# Patient Record
Sex: FEMALE | Race: WHITE | NOT HISPANIC OR LATINO | Employment: OTHER | RURAL
[De-identification: names, ages, dates, MRNs, and addresses within clinical notes are randomized per-mention and may not be internally consistent; named-entity substitution may affect disease eponyms.]

---

## 2021-02-09 ENCOUNTER — HISTORICAL (OUTPATIENT)
Dept: ADMINISTRATIVE | Facility: HOSPITAL | Age: 76
End: 2021-02-09

## 2021-03-15 RX ORDER — NAPROXEN SODIUM 220 MG/1
81 TABLET, FILM COATED ORAL DAILY
COMMUNITY
End: 2022-07-18

## 2021-03-15 RX ORDER — METFORMIN HYDROCHLORIDE 500 MG/1
500 TABLET ORAL 2 TIMES DAILY WITH MEALS
COMMUNITY
End: 2021-03-16 | Stop reason: SDUPTHER

## 2021-03-15 RX ORDER — LISINOPRIL AND HYDROCHLOROTHIAZIDE 12.5; 2 MG/1; MG/1
1 TABLET ORAL DAILY
COMMUNITY
End: 2021-03-16 | Stop reason: SDUPTHER

## 2021-03-15 RX ORDER — PAROXETINE 10 MG/1
10 TABLET, FILM COATED ORAL EVERY MORNING
COMMUNITY
End: 2021-03-16 | Stop reason: SDUPTHER

## 2021-03-15 RX ORDER — METOPROLOL TARTRATE 100 MG/1
100 TABLET ORAL 2 TIMES DAILY
COMMUNITY
End: 2021-03-16 | Stop reason: SDUPTHER

## 2021-03-15 RX ORDER — HYDRALAZINE HYDROCHLORIDE 10 MG/1
10 TABLET, FILM COATED ORAL 2 TIMES DAILY
COMMUNITY
End: 2021-03-16 | Stop reason: SDUPTHER

## 2021-03-16 ENCOUNTER — OFFICE VISIT (OUTPATIENT)
Dept: NEUROLOGY | Facility: CLINIC | Age: 76
End: 2021-03-16
Payer: MEDICARE

## 2021-03-16 VITALS
DIASTOLIC BLOOD PRESSURE: 80 MMHG | SYSTOLIC BLOOD PRESSURE: 160 MMHG | WEIGHT: 158 LBS | RESPIRATION RATE: 18 BRPM | OXYGEN SATURATION: 98 % | HEIGHT: 59 IN | BODY MASS INDEX: 31.85 KG/M2 | HEART RATE: 64 BPM

## 2021-03-16 DIAGNOSIS — I63.9 CEREBROVASCULAR ACCIDENT (CVA), UNSPECIFIED MECHANISM: Primary | ICD-10-CM

## 2021-03-16 DIAGNOSIS — E11.9 TYPE 2 DIABETES MELLITUS WITHOUT COMPLICATION, WITH LONG-TERM CURRENT USE OF INSULIN: ICD-10-CM

## 2021-03-16 DIAGNOSIS — I10 HYPERTENSION, UNSPECIFIED TYPE: ICD-10-CM

## 2021-03-16 DIAGNOSIS — F32.A DEPRESSION, UNSPECIFIED DEPRESSION TYPE: ICD-10-CM

## 2021-03-16 DIAGNOSIS — Z79.4 TYPE 2 DIABETES MELLITUS WITHOUT COMPLICATION, WITH LONG-TERM CURRENT USE OF INSULIN: ICD-10-CM

## 2021-03-16 PROCEDURE — 99214 OFFICE O/P EST MOD 30 MIN: CPT | Mod: PBBFAC | Performed by: NURSE PRACTITIONER

## 2021-03-16 PROCEDURE — 99999 PR PBB SHADOW E&M-EST. PATIENT-LVL IV: CPT | Mod: PBBFAC,,, | Performed by: NURSE PRACTITIONER

## 2021-03-16 PROCEDURE — 99213 PR OFFICE/OUTPT VISIT, EST, LEVL III, 20-29 MIN: ICD-10-PCS | Mod: S$PBB,,, | Performed by: NURSE PRACTITIONER

## 2021-03-16 PROCEDURE — 99999 PR PBB SHADOW E&M-EST. PATIENT-LVL IV: ICD-10-PCS | Mod: PBBFAC,,, | Performed by: NURSE PRACTITIONER

## 2021-03-16 PROCEDURE — 99213 OFFICE O/P EST LOW 20 MIN: CPT | Mod: S$PBB,,, | Performed by: NURSE PRACTITIONER

## 2021-03-16 RX ORDER — LISINOPRIL AND HYDROCHLOROTHIAZIDE 12.5; 2 MG/1; MG/1
1 TABLET ORAL DAILY
Qty: 30 TABLET | Refills: 0 | Status: SHIPPED | OUTPATIENT
Start: 2021-03-16 | End: 2021-05-03 | Stop reason: SDUPTHER

## 2021-03-16 RX ORDER — METFORMIN HYDROCHLORIDE 500 MG/1
500 TABLET ORAL 2 TIMES DAILY WITH MEALS
Qty: 30 TABLET | Refills: 0 | Status: SHIPPED | OUTPATIENT
Start: 2021-03-16 | End: 2021-05-03 | Stop reason: SDUPTHER

## 2021-03-16 RX ORDER — METOPROLOL TARTRATE 100 MG/1
100 TABLET ORAL 2 TIMES DAILY
Qty: 30 TABLET | Refills: 0 | Status: SHIPPED | OUTPATIENT
Start: 2021-03-16 | End: 2021-05-03 | Stop reason: SDUPTHER

## 2021-03-16 RX ORDER — HYDRALAZINE HYDROCHLORIDE 10 MG/1
10 TABLET, FILM COATED ORAL 2 TIMES DAILY
Qty: 30 TABLET | Refills: 0 | Status: SHIPPED | OUTPATIENT
Start: 2021-03-16 | End: 2021-05-03 | Stop reason: SDUPTHER

## 2021-03-16 RX ORDER — PAROXETINE 10 MG/1
10 TABLET, FILM COATED ORAL EVERY MORNING
Qty: 30 TABLET | Refills: 0 | Status: SHIPPED | OUTPATIENT
Start: 2021-03-16 | End: 2021-05-03 | Stop reason: SDUPTHER

## 2021-05-03 ENCOUNTER — OFFICE VISIT (OUTPATIENT)
Dept: PRIMARY CARE CLINIC | Facility: CLINIC | Age: 76
End: 2021-05-03
Payer: MEDICARE

## 2021-05-03 VITALS
WEIGHT: 141 LBS | OXYGEN SATURATION: 98 % | HEART RATE: 62 BPM | SYSTOLIC BLOOD PRESSURE: 152 MMHG | HEIGHT: 60 IN | RESPIRATION RATE: 18 BRPM | DIASTOLIC BLOOD PRESSURE: 84 MMHG | BODY MASS INDEX: 27.68 KG/M2

## 2021-05-03 DIAGNOSIS — M54.9 DORSALGIA, UNSPECIFIED: ICD-10-CM

## 2021-05-03 DIAGNOSIS — R20.2 NUMBNESS AND TINGLING OF LEFT LEG: Primary | ICD-10-CM

## 2021-05-03 DIAGNOSIS — I63.9 CEREBROVASCULAR ACCIDENT (CVA), UNSPECIFIED MECHANISM: ICD-10-CM

## 2021-05-03 DIAGNOSIS — I10 HYPERTENSION, UNSPECIFIED TYPE: ICD-10-CM

## 2021-05-03 DIAGNOSIS — E11.9 TYPE 2 DIABETES MELLITUS WITHOUT COMPLICATION, WITH LONG-TERM CURRENT USE OF INSULIN: ICD-10-CM

## 2021-05-03 DIAGNOSIS — R20.0 NUMBNESS AND TINGLING OF LEFT LEG: Primary | ICD-10-CM

## 2021-05-03 DIAGNOSIS — F32.A DEPRESSION, UNSPECIFIED DEPRESSION TYPE: ICD-10-CM

## 2021-05-03 DIAGNOSIS — Z79.4 TYPE 2 DIABETES MELLITUS WITHOUT COMPLICATION, WITH LONG-TERM CURRENT USE OF INSULIN: ICD-10-CM

## 2021-05-03 PROCEDURE — 99214 OFFICE O/P EST MOD 30 MIN: CPT | Mod: ,,, | Performed by: FAMILY MEDICINE

## 2021-05-03 PROCEDURE — 99214 PR OFFICE/OUTPT VISIT, EST, LEVL IV, 30-39 MIN: ICD-10-PCS | Mod: ,,, | Performed by: FAMILY MEDICINE

## 2021-05-03 RX ORDER — PAROXETINE 10 MG/1
10 TABLET, FILM COATED ORAL EVERY MORNING
Qty: 90 TABLET | Refills: 3 | Status: SHIPPED | OUTPATIENT
Start: 2021-05-03 | End: 2022-06-14 | Stop reason: SDUPTHER

## 2021-05-03 RX ORDER — METFORMIN HYDROCHLORIDE 500 MG/1
1000 TABLET ORAL 2 TIMES DAILY WITH MEALS
Qty: 360 TABLET | Refills: 3 | Status: SHIPPED | OUTPATIENT
Start: 2021-05-03 | End: 2022-06-14 | Stop reason: SDUPTHER

## 2021-05-03 RX ORDER — HYDRALAZINE HYDROCHLORIDE 10 MG/1
10 TABLET, FILM COATED ORAL 2 TIMES DAILY
Qty: 180 TABLET | Refills: 3 | Status: SHIPPED | OUTPATIENT
Start: 2021-05-03 | End: 2021-05-27

## 2021-05-03 RX ORDER — LISINOPRIL AND HYDROCHLOROTHIAZIDE 12.5; 2 MG/1; MG/1
1 TABLET ORAL 2 TIMES DAILY
Qty: 180 TABLET | Refills: 3 | Status: SHIPPED | OUTPATIENT
Start: 2021-05-03 | End: 2022-06-13 | Stop reason: SDUPTHER

## 2021-05-03 RX ORDER — METOPROLOL TARTRATE 100 MG/1
100 TABLET ORAL 2 TIMES DAILY
Qty: 180 TABLET | Refills: 3 | Status: SHIPPED | OUTPATIENT
Start: 2021-05-03 | End: 2022-06-15

## 2021-05-27 ENCOUNTER — OFFICE VISIT (OUTPATIENT)
Dept: PRIMARY CARE CLINIC | Facility: CLINIC | Age: 76
End: 2021-05-27
Payer: MEDICARE

## 2021-05-27 VITALS
HEIGHT: 60 IN | DIASTOLIC BLOOD PRESSURE: 82 MMHG | OXYGEN SATURATION: 99 % | WEIGHT: 143 LBS | BODY MASS INDEX: 28.07 KG/M2 | RESPIRATION RATE: 18 BRPM | SYSTOLIC BLOOD PRESSURE: 184 MMHG

## 2021-05-27 DIAGNOSIS — R20.2 NUMBNESS AND TINGLING OF LEFT LEG: Primary | ICD-10-CM

## 2021-05-27 DIAGNOSIS — I10 HYPERTENSION, UNSPECIFIED TYPE: ICD-10-CM

## 2021-05-27 DIAGNOSIS — M54.9 DORSALGIA, UNSPECIFIED: ICD-10-CM

## 2021-05-27 DIAGNOSIS — R20.0 NUMBNESS AND TINGLING OF LEFT LEG: Primary | ICD-10-CM

## 2021-05-27 DIAGNOSIS — F32.A DEPRESSION, UNSPECIFIED DEPRESSION TYPE: ICD-10-CM

## 2021-05-27 DIAGNOSIS — E11.9 TYPE 2 DIABETES MELLITUS WITHOUT COMPLICATION, WITH LONG-TERM CURRENT USE OF INSULIN: ICD-10-CM

## 2021-05-27 DIAGNOSIS — Z79.4 TYPE 2 DIABETES MELLITUS WITHOUT COMPLICATION, WITH LONG-TERM CURRENT USE OF INSULIN: ICD-10-CM

## 2021-05-27 DIAGNOSIS — I63.9 CEREBROVASCULAR ACCIDENT (CVA), UNSPECIFIED MECHANISM: ICD-10-CM

## 2021-05-27 PROCEDURE — 99214 OFFICE O/P EST MOD 30 MIN: CPT | Mod: ,,, | Performed by: FAMILY MEDICINE

## 2021-05-27 PROCEDURE — 99214 PR OFFICE/OUTPT VISIT, EST, LEVL IV, 30-39 MIN: ICD-10-PCS | Mod: ,,, | Performed by: FAMILY MEDICINE

## 2021-05-27 RX ORDER — HYDRALAZINE HYDROCHLORIDE 25 MG/1
25 TABLET, FILM COATED ORAL 3 TIMES DAILY
Qty: 270 TABLET | Refills: 3 | Status: SHIPPED | OUTPATIENT
Start: 2021-05-27 | End: 2022-05-27

## 2021-09-01 ENCOUNTER — HOSPITAL ENCOUNTER (EMERGENCY)
Facility: HOSPITAL | Age: 76
Discharge: HOME | End: 2021-09-01
Attending: FAMILY MEDICINE
Payer: MEDICARE

## 2021-09-01 VITALS
BODY MASS INDEX: 27.82 KG/M2 | RESPIRATION RATE: 12 BRPM | SYSTOLIC BLOOD PRESSURE: 169 MMHG | HEIGHT: 59 IN | DIASTOLIC BLOOD PRESSURE: 77 MMHG | WEIGHT: 138 LBS | HEART RATE: 58 BPM | OXYGEN SATURATION: 95 %

## 2021-09-01 DIAGNOSIS — E11.9 TYPE 2 DIABETES MELLITUS WITHOUT COMPLICATION, WITH LONG-TERM CURRENT USE OF INSULIN: ICD-10-CM

## 2021-09-01 DIAGNOSIS — I63.9 CEREBROVASCULAR ACCIDENT (CVA): ICD-10-CM

## 2021-09-01 DIAGNOSIS — R20.2 NUMBNESS AND TINGLING OF LEFT LEG: ICD-10-CM

## 2021-09-01 DIAGNOSIS — Z79.4 TYPE 2 DIABETES MELLITUS WITHOUT COMPLICATION, WITH LONG-TERM CURRENT USE OF INSULIN: ICD-10-CM

## 2021-09-01 DIAGNOSIS — R20.0 NUMBNESS AND TINGLING OF LEFT LEG: ICD-10-CM

## 2021-09-01 DIAGNOSIS — F32.A DEPRESSION: ICD-10-CM

## 2021-09-01 DIAGNOSIS — R20.0 NUMBNESS: ICD-10-CM

## 2021-09-01 DIAGNOSIS — M54.9 DORSALGIA, UNSPECIFIED: ICD-10-CM

## 2021-09-01 DIAGNOSIS — I10 HYPERTENSION: ICD-10-CM

## 2021-09-01 LAB
ALBUMIN SERPL BCP-MCNC: 3.6 G/DL (ref 3.5–5)
ALBUMIN/GLOB SERPL: 1 {RATIO}
ALP SERPL-CCNC: 108 U/L (ref 55–142)
ALT SERPL W P-5'-P-CCNC: 34 U/L (ref 13–56)
ANION GAP SERPL CALCULATED.3IONS-SCNC: 15 MMOL/L (ref 7–16)
APTT PPP: 27.2 SECONDS (ref 25.2–37.3)
AST SERPL W P-5'-P-CCNC: 23 U/L (ref 15–37)
BACTERIA #/AREA URNS HPF: ABNORMAL /HPF
BASOPHILS # BLD AUTO: 0.05 K/UL (ref 0–0.2)
BASOPHILS NFR BLD AUTO: 0.7 % (ref 0–1)
BILIRUB SERPL-MCNC: 0.3 MG/DL (ref 0–1.2)
BILIRUB UR QL STRIP: NEGATIVE
BUN SERPL-MCNC: 22 MG/DL (ref 7–18)
BUN/CREAT SERPL: 18 (ref 6–20)
CALCIUM SERPL-MCNC: 8.8 MG/DL (ref 8.5–10.1)
CHLORIDE SERPL-SCNC: 100 MMOL/L (ref 98–107)
CLARITY UR: CLEAR
CO2 SERPL-SCNC: 28 MMOL/L (ref 21–32)
COLOR UR: ABNORMAL
CREAT SERPL-MCNC: 1.21 MG/DL (ref 0.55–1.02)
DIFFERENTIAL METHOD BLD: ABNORMAL
EOSINOPHIL # BLD AUTO: 0.11 K/UL (ref 0–0.5)
EOSINOPHIL NFR BLD AUTO: 1.6 % (ref 1–4)
ERYTHROCYTE [DISTWIDTH] IN BLOOD BY AUTOMATED COUNT: 11.8 % (ref 11.5–14.5)
GLOBULIN SER-MCNC: 3.5 G/DL (ref 2–4)
GLUCOSE SERPL-MCNC: 341 MG/DL (ref 74–106)
GLUCOSE UR STRIP-MCNC: >=1000 MG/DL
HCT VFR BLD AUTO: 33.8 % (ref 38–47)
HGB BLD-MCNC: 12.3 G/DL (ref 12–16)
IMM GRANULOCYTES # BLD AUTO: 0.12 K/UL (ref 0–0.04)
IMM GRANULOCYTES NFR BLD: 1.8 % (ref 0–0.4)
INR BLD: 0.9 (ref 0.9–1.1)
KETONES UR STRIP-SCNC: NEGATIVE MG/DL
LEUKOCYTE ESTERASE UR QL STRIP: ABNORMAL
LYMPHOCYTES # BLD AUTO: 2.45 K/UL (ref 1–4.8)
LYMPHOCYTES NFR BLD AUTO: 36.6 % (ref 27–41)
MAGNESIUM SERPL-MCNC: 1.8 MG/DL (ref 1.7–2.3)
MCH RBC QN AUTO: 32.9 PG (ref 27–31)
MCHC RBC AUTO-ENTMCNC: 36.4 G/DL (ref 32–36)
MCV RBC AUTO: 90.4 FL (ref 80–96)
MONOCYTES # BLD AUTO: 0.73 K/UL (ref 0–0.8)
MONOCYTES NFR BLD AUTO: 10.9 % (ref 2–6)
MPC BLD CALC-MCNC: 10.4 FL (ref 9.4–12.4)
NEUTROPHILS # BLD AUTO: 3.24 K/UL (ref 1.8–7.7)
NEUTROPHILS NFR BLD AUTO: 48.4 % (ref 53–65)
NITRITE UR QL STRIP: NEGATIVE
NRBC # BLD AUTO: 0 X10E3/UL
NRBC, AUTO (.00): 0 %
PH UR STRIP: 5.5 PH UNITS
PLATELET # BLD AUTO: 238 K/UL (ref 150–400)
POTASSIUM SERPL-SCNC: 3.8 MMOL/L (ref 3.5–5.1)
PROT SERPL-MCNC: 7.1 G/DL (ref 6.4–8.2)
PROT UR QL STRIP: NEGATIVE
PROTHROMBIN TIME: 12.2 SECONDS (ref 11.7–14.7)
RBC # BLD AUTO: 3.74 M/UL (ref 4.2–5.4)
RBC # UR STRIP: NEGATIVE /UL
RBC #/AREA URNS HPF: ABNORMAL /HPF
SODIUM SERPL-SCNC: 139 MMOL/L (ref 136–145)
SP GR UR STRIP: 1.02
SQUAMOUS #/AREA URNS LPF: ABNORMAL /LPF
TROPONIN I SERPL-MCNC: <0.017 NG/ML
UROBILINOGEN UR STRIP-ACNC: 0.2 MG/DL
WBC # BLD AUTO: 6.7 K/UL (ref 4.5–11)
WBC #/AREA URNS HPF: ABNORMAL /HPF

## 2021-09-01 PROCEDURE — 83735 ASSAY OF MAGNESIUM: CPT | Performed by: FAMILY MEDICINE

## 2021-09-01 PROCEDURE — 25500020 PHARM REV CODE 255: Performed by: FAMILY MEDICINE

## 2021-09-01 PROCEDURE — 99284 PR EMERGENCY DEPT VISIT,LEVEL IV: ICD-10-PCS | Mod: ,,, | Performed by: FAMILY MEDICINE

## 2021-09-01 PROCEDURE — 36415 COLL VENOUS BLD VENIPUNCTURE: CPT | Performed by: FAMILY MEDICINE

## 2021-09-01 PROCEDURE — 99284 EMERGENCY DEPT VISIT MOD MDM: CPT | Mod: ,,, | Performed by: FAMILY MEDICINE

## 2021-09-01 PROCEDURE — 93005 ELECTROCARDIOGRAM TRACING: CPT

## 2021-09-01 PROCEDURE — 99285 EMERGENCY DEPT VISIT HI MDM: CPT | Mod: 25

## 2021-09-01 PROCEDURE — 81001 URINALYSIS AUTO W/SCOPE: CPT | Performed by: FAMILY MEDICINE

## 2021-09-01 PROCEDURE — 85730 THROMBOPLASTIN TIME PARTIAL: CPT | Performed by: FAMILY MEDICINE

## 2021-09-01 PROCEDURE — 85610 PROTHROMBIN TIME: CPT | Performed by: FAMILY MEDICINE

## 2021-09-01 PROCEDURE — 80053 COMPREHEN METABOLIC PANEL: CPT | Performed by: FAMILY MEDICINE

## 2021-09-01 PROCEDURE — 81003 URINALYSIS AUTO W/O SCOPE: CPT | Performed by: FAMILY MEDICINE

## 2021-09-01 PROCEDURE — 85025 COMPLETE CBC W/AUTO DIFF WBC: CPT | Performed by: FAMILY MEDICINE

## 2021-09-01 PROCEDURE — 93010 ELECTROCARDIOGRAM REPORT: CPT | Mod: ,,, | Performed by: HOSPITALIST

## 2021-09-01 PROCEDURE — 84484 ASSAY OF TROPONIN QUANT: CPT | Performed by: FAMILY MEDICINE

## 2021-09-01 PROCEDURE — 93010 EKG 12-LEAD: ICD-10-PCS | Mod: ,,, | Performed by: HOSPITALIST

## 2021-09-01 RX ORDER — CLINDAMYCIN PHOSPHATE 10 UG/ML
LOTION TOPICAL
COMMUNITY
Start: 2021-08-13 | End: 2023-11-07

## 2021-09-01 RX ADMIN — IOPAMIDOL 100 ML: 755 INJECTION, SOLUTION INTRAVENOUS at 08:09

## 2021-09-07 ENCOUNTER — OFFICE VISIT (OUTPATIENT)
Dept: NEUROLOGY | Facility: CLINIC | Age: 76
End: 2021-09-07
Payer: MEDICARE

## 2021-09-07 VITALS
DIASTOLIC BLOOD PRESSURE: 78 MMHG | HEIGHT: 59 IN | OXYGEN SATURATION: 99 % | WEIGHT: 140 LBS | BODY MASS INDEX: 28.22 KG/M2 | SYSTOLIC BLOOD PRESSURE: 160 MMHG | HEART RATE: 49 BPM

## 2021-09-07 DIAGNOSIS — I63.9 CEREBROVASCULAR ACCIDENT (CVA), UNSPECIFIED MECHANISM: Primary | ICD-10-CM

## 2021-09-07 DIAGNOSIS — E78.5 HYPERLIPIDEMIA, UNSPECIFIED HYPERLIPIDEMIA TYPE: ICD-10-CM

## 2021-09-07 DIAGNOSIS — E53.8 VITAMIN B12 DEFICIENCY: ICD-10-CM

## 2021-09-07 DIAGNOSIS — I10 HYPERTENSION, UNSPECIFIED TYPE: ICD-10-CM

## 2021-09-07 DIAGNOSIS — E03.9 HYPOTHYROIDISM, UNSPECIFIED TYPE: ICD-10-CM

## 2021-09-07 DIAGNOSIS — I63.89 OTHER CEREBRAL INFARCTION: ICD-10-CM

## 2021-09-07 DIAGNOSIS — I69.354 HEMIPARESIS AFFECTING LEFT SIDE AS LATE EFFECT OF CEREBROVASCULAR ACCIDENT (CVA): ICD-10-CM

## 2021-09-07 DIAGNOSIS — R20.0 NUMBNESS AND TINGLING OF LEFT LEG: ICD-10-CM

## 2021-09-07 DIAGNOSIS — R20.2 NUMBNESS AND TINGLING OF LEFT LEG: ICD-10-CM

## 2021-09-07 PROCEDURE — 99214 PR OFFICE/OUTPT VISIT, EST, LEVL IV, 30-39 MIN: ICD-10-PCS | Mod: S$PBB,,, | Performed by: NURSE PRACTITIONER

## 2021-09-07 PROCEDURE — 85610 PROTHROMBIN TIME: CPT | Performed by: NURSE PRACTITIONER

## 2021-09-07 PROCEDURE — 99214 OFFICE O/P EST MOD 30 MIN: CPT | Mod: S$PBB,,, | Performed by: NURSE PRACTITIONER

## 2021-09-07 PROCEDURE — 99215 OFFICE O/P EST HI 40 MIN: CPT | Mod: PBBFAC | Performed by: NURSE PRACTITIONER

## 2021-09-09 ENCOUNTER — TELEPHONE (OUTPATIENT)
Dept: NEUROLOGY | Facility: CLINIC | Age: 76
End: 2021-09-09

## 2021-09-10 ENCOUNTER — HOSPITAL ENCOUNTER (OUTPATIENT)
Dept: RADIOLOGY | Facility: HOSPITAL | Age: 76
Discharge: HOME | End: 2021-09-10
Attending: NURSE PRACTITIONER
Payer: MEDICARE

## 2021-09-10 DIAGNOSIS — I63.89 OTHER CEREBRAL INFARCTION: ICD-10-CM

## 2021-09-10 PROCEDURE — 70551 MRI BRAIN STEM W/O DYE: CPT | Mod: 26,,, | Performed by: RADIOLOGY

## 2021-09-10 PROCEDURE — 70551 MRI BRAIN WITHOUT CONTRAST: ICD-10-PCS | Mod: 26,,, | Performed by: RADIOLOGY

## 2021-09-10 PROCEDURE — 70551 MRI BRAIN STEM W/O DYE: CPT | Mod: TC

## 2021-09-13 DIAGNOSIS — I63.9 CEREBROVASCULAR ACCIDENT (CVA), UNSPECIFIED MECHANISM: Primary | ICD-10-CM

## 2021-09-14 ENCOUNTER — TELEPHONE (OUTPATIENT)
Dept: NEUROLOGY | Facility: CLINIC | Age: 76
End: 2021-09-14

## 2021-09-14 DIAGNOSIS — I63.9 CEREBROVASCULAR ACCIDENT (CVA), UNSPECIFIED MECHANISM: Primary | ICD-10-CM

## 2021-09-14 RX ORDER — CLOPIDOGREL BISULFATE 75 MG/1
75 TABLET ORAL DAILY
Qty: 30 TABLET | Refills: 11 | Status: SHIPPED | OUTPATIENT
Start: 2021-09-14 | End: 2022-06-21 | Stop reason: SDUPTHER

## 2021-09-21 ENCOUNTER — OFFICE VISIT (OUTPATIENT)
Dept: NEUROLOGY | Facility: CLINIC | Age: 76
End: 2021-09-21
Payer: MEDICARE

## 2021-09-21 VITALS
DIASTOLIC BLOOD PRESSURE: 78 MMHG | OXYGEN SATURATION: 96 % | WEIGHT: 141 LBS | BODY MASS INDEX: 28.43 KG/M2 | HEART RATE: 57 BPM | HEIGHT: 59 IN | SYSTOLIC BLOOD PRESSURE: 138 MMHG

## 2021-09-21 DIAGNOSIS — E78.5 HYPERLIPIDEMIA, UNSPECIFIED HYPERLIPIDEMIA TYPE: ICD-10-CM

## 2021-09-21 DIAGNOSIS — I69.354 HEMIPARESIS AFFECTING LEFT SIDE AS LATE EFFECT OF CEREBROVASCULAR ACCIDENT (CVA): ICD-10-CM

## 2021-09-21 DIAGNOSIS — I10 HYPERTENSION, UNSPECIFIED TYPE: ICD-10-CM

## 2021-09-21 DIAGNOSIS — I63.9 CEREBROVASCULAR ACCIDENT (CVA), UNSPECIFIED MECHANISM: Primary | Chronic | ICD-10-CM

## 2021-09-21 PROCEDURE — 99213 OFFICE O/P EST LOW 20 MIN: CPT | Mod: S$PBB,,, | Performed by: NURSE PRACTITIONER

## 2021-09-21 PROCEDURE — 99214 OFFICE O/P EST MOD 30 MIN: CPT | Mod: PBBFAC | Performed by: NURSE PRACTITIONER

## 2021-09-21 PROCEDURE — 99213 PR OFFICE/OUTPT VISIT, EST, LEVL III, 20-29 MIN: ICD-10-PCS | Mod: S$PBB,,, | Performed by: NURSE PRACTITIONER

## 2021-11-15 ENCOUNTER — OFFICE VISIT (OUTPATIENT)
Dept: CARDIOLOGY | Facility: CLINIC | Age: 76
End: 2021-11-15
Payer: MEDICARE

## 2021-11-15 ENCOUNTER — OFFICE VISIT (OUTPATIENT)
Dept: NEUROLOGY | Facility: CLINIC | Age: 76
End: 2021-11-15
Payer: MEDICARE

## 2021-11-15 VITALS
WEIGHT: 142 LBS | HEIGHT: 59 IN | SYSTOLIC BLOOD PRESSURE: 150 MMHG | BODY MASS INDEX: 28.63 KG/M2 | RESPIRATION RATE: 18 BRPM | DIASTOLIC BLOOD PRESSURE: 68 MMHG | HEART RATE: 68 BPM | OXYGEN SATURATION: 97 %

## 2021-11-15 VITALS
WEIGHT: 142 LBS | DIASTOLIC BLOOD PRESSURE: 72 MMHG | HEART RATE: 58 BPM | BODY MASS INDEX: 28.63 KG/M2 | HEIGHT: 59 IN | OXYGEN SATURATION: 97 % | SYSTOLIC BLOOD PRESSURE: 134 MMHG

## 2021-11-15 DIAGNOSIS — I65.23 BILATERAL CAROTID ARTERY STENOSIS: ICD-10-CM

## 2021-11-15 DIAGNOSIS — I63.9 CEREBROVASCULAR ACCIDENT (CVA), UNSPECIFIED MECHANISM: Primary | Chronic | ICD-10-CM

## 2021-11-15 DIAGNOSIS — I51.81 TAKOTSUBO CARDIOMYOPATHY: ICD-10-CM

## 2021-11-15 DIAGNOSIS — I10 HYPERTENSION, UNSPECIFIED TYPE: ICD-10-CM

## 2021-11-15 DIAGNOSIS — I69.354 HEMIPARESIS AFFECTING LEFT SIDE AS LATE EFFECT OF CEREBROVASCULAR ACCIDENT (CVA): ICD-10-CM

## 2021-11-15 DIAGNOSIS — E78.5 HYPERLIPIDEMIA, UNSPECIFIED HYPERLIPIDEMIA TYPE: ICD-10-CM

## 2021-11-15 PROCEDURE — 99213 OFFICE O/P EST LOW 20 MIN: CPT | Mod: S$PBB,,, | Performed by: NURSE PRACTITIONER

## 2021-11-15 PROCEDURE — 99214 OFFICE O/P EST MOD 30 MIN: CPT | Mod: S$PBB,,, | Performed by: NURSE PRACTITIONER

## 2021-11-15 PROCEDURE — 99214 PR OFFICE/OUTPT VISIT, EST, LEVL IV, 30-39 MIN: ICD-10-PCS | Mod: S$PBB,,, | Performed by: NURSE PRACTITIONER

## 2021-11-15 PROCEDURE — 99215 OFFICE O/P EST HI 40 MIN: CPT | Mod: PBBFAC,25 | Performed by: NURSE PRACTITIONER

## 2021-11-15 PROCEDURE — 99214 OFFICE O/P EST MOD 30 MIN: CPT | Mod: PBBFAC,27 | Performed by: NURSE PRACTITIONER

## 2021-11-15 PROCEDURE — 99213 PR OFFICE/OUTPT VISIT, EST, LEVL III, 20-29 MIN: ICD-10-PCS | Mod: S$PBB,,, | Performed by: NURSE PRACTITIONER

## 2021-11-15 RX ORDER — PRAVASTATIN SODIUM 20 MG/1
20 TABLET ORAL DAILY
Qty: 90 TABLET | Refills: 3 | Status: SHIPPED | OUTPATIENT
Start: 2021-11-15 | End: 2022-06-22

## 2021-11-20 ENCOUNTER — OFFICE VISIT (OUTPATIENT)
Dept: FAMILY MEDICINE | Facility: CLINIC | Age: 76
End: 2021-11-20
Payer: MEDICARE

## 2021-11-20 VITALS — HEIGHT: 59 IN | WEIGHT: 142 LBS | BODY MASS INDEX: 28.63 KG/M2

## 2021-11-20 DIAGNOSIS — Z20.828 CONTACT WITH AND (SUSPECTED) EXPOSURE TO OTHER VIRAL COMMUNICABLE DISEASES: Primary | ICD-10-CM

## 2021-11-20 DIAGNOSIS — U07.1 COVID-19 VIRUS INFECTION: ICD-10-CM

## 2021-11-20 LAB
CTP QC/QA: YES
SARS-COV-2 AG RESP QL IA.RAPID: POSITIVE

## 2021-11-20 PROCEDURE — 87426 SARSCOV CORONAVIRUS AG IA: CPT | Mod: RHCUB | Performed by: FAMILY MEDICINE

## 2021-11-20 PROCEDURE — 99214 PR OFFICE/OUTPT VISIT, EST, LEVL IV, 30-39 MIN: ICD-10-PCS | Mod: CR,,, | Performed by: FAMILY MEDICINE

## 2021-11-20 PROCEDURE — 99214 OFFICE O/P EST MOD 30 MIN: CPT | Mod: CR,,, | Performed by: FAMILY MEDICINE

## 2022-03-11 DIAGNOSIS — Z71.89 COMPLEX CARE COORDINATION: ICD-10-CM

## 2022-06-13 ENCOUNTER — OFFICE VISIT (OUTPATIENT)
Dept: CARDIOLOGY | Facility: CLINIC | Age: 77
End: 2022-06-13
Payer: MEDICARE

## 2022-06-13 VITALS
SYSTOLIC BLOOD PRESSURE: 148 MMHG | DIASTOLIC BLOOD PRESSURE: 82 MMHG | HEIGHT: 59 IN | WEIGHT: 134 LBS | BODY MASS INDEX: 27.01 KG/M2 | OXYGEN SATURATION: 97 % | HEART RATE: 59 BPM

## 2022-06-13 DIAGNOSIS — R01.1 UNDIAGNOSED CARDIAC MURMURS: ICD-10-CM

## 2022-06-13 DIAGNOSIS — I10 HYPERTENSION, UNSPECIFIED TYPE: ICD-10-CM

## 2022-06-13 DIAGNOSIS — I51.81 TAKOTSUBO CARDIOMYOPATHY: Primary | ICD-10-CM

## 2022-06-13 PROCEDURE — 99214 PR OFFICE/OUTPT VISIT, EST, LEVL IV, 30-39 MIN: ICD-10-PCS | Mod: S$PBB,,, | Performed by: NURSE PRACTITIONER

## 2022-06-13 PROCEDURE — 93005 ELECTROCARDIOGRAM TRACING: CPT | Mod: PBBFAC | Performed by: INTERNAL MEDICINE

## 2022-06-13 PROCEDURE — 99214 OFFICE O/P EST MOD 30 MIN: CPT | Mod: S$PBB,,, | Performed by: NURSE PRACTITIONER

## 2022-06-13 PROCEDURE — 93010 ELECTROCARDIOGRAM REPORT: CPT | Mod: S$PBB,,, | Performed by: INTERNAL MEDICINE

## 2022-06-13 PROCEDURE — 93010 EKG 12-LEAD: ICD-10-PCS | Mod: S$PBB,,, | Performed by: INTERNAL MEDICINE

## 2022-06-13 PROCEDURE — 99213 OFFICE O/P EST LOW 20 MIN: CPT | Mod: PBBFAC | Performed by: NURSE PRACTITIONER

## 2022-06-13 RX ORDER — VITAMIN E 268 MG
400 CAPSULE ORAL DAILY
COMMUNITY
End: 2023-11-07

## 2022-06-13 RX ORDER — HYDRALAZINE HYDROCHLORIDE 10 MG/1
10 TABLET, FILM COATED ORAL 2 TIMES DAILY
COMMUNITY
Start: 2022-04-09 | End: 2022-06-14 | Stop reason: SDUPTHER

## 2022-06-14 DIAGNOSIS — Z79.4 TYPE 2 DIABETES MELLITUS WITHOUT COMPLICATION, WITH LONG-TERM CURRENT USE OF INSULIN: ICD-10-CM

## 2022-06-14 DIAGNOSIS — F32.A DEPRESSION, UNSPECIFIED DEPRESSION TYPE: ICD-10-CM

## 2022-06-14 DIAGNOSIS — E11.9 TYPE 2 DIABETES MELLITUS WITHOUT COMPLICATION, WITH LONG-TERM CURRENT USE OF INSULIN: ICD-10-CM

## 2022-06-14 RX ORDER — METFORMIN HYDROCHLORIDE 500 MG/1
1000 TABLET ORAL 2 TIMES DAILY WITH MEALS
Qty: 360 TABLET | Refills: 0 | Status: SHIPPED | OUTPATIENT
Start: 2022-06-14 | End: 2022-06-22 | Stop reason: SDUPTHER

## 2022-06-14 RX ORDER — PAROXETINE 10 MG/1
10 TABLET, FILM COATED ORAL EVERY MORNING
Qty: 90 TABLET | Refills: 0 | Status: SHIPPED | OUTPATIENT
Start: 2022-06-14 | End: 2022-06-22 | Stop reason: SDUPTHER

## 2022-06-14 RX ORDER — HYDRALAZINE HYDROCHLORIDE 10 MG/1
10 TABLET, FILM COATED ORAL 2 TIMES DAILY
Qty: 180 TABLET | Refills: 0 | Status: SHIPPED | OUTPATIENT
Start: 2022-06-14 | End: 2022-06-22 | Stop reason: SDUPTHER

## 2022-06-14 NOTE — TELEPHONE ENCOUNTER
----- Message from Aziza Caldwell sent at 6/13/2022  2:27 PM CDT -----  Regarding: MEDICATION REFILL  PATIENT NEEDS REFILL ON THE FOLLOWING :      1.hydrALAZINE (APRESOLINE) 10 MG tablet       2.lisinopriL-hydrochlorothiazide (PRINZIDE,ZESTORETIC) 20-12.5 mg per tablet 180 tablet     3.metFORMIN (GLUCOPHAGE) 500 MG tablet 360 tablet Sig: Take 1 tablet (100 mg total)     4.paroxetine (PAXIL) 10 MG tablet 90 tablet     5.metoprolol tartrate (LOPRESSOR) 100 MG tablet      CALLED INTO [facility]

## 2022-06-15 DIAGNOSIS — I10 HYPERTENSION, UNSPECIFIED TYPE: ICD-10-CM

## 2022-06-15 RX ORDER — METOPROLOL TARTRATE 100 MG/1
TABLET ORAL
Qty: 180 TABLET | Refills: 0 | Status: SHIPPED | OUTPATIENT
Start: 2022-06-15 | End: 2022-06-15 | Stop reason: SDUPTHER

## 2022-06-16 RX ORDER — METOPROLOL TARTRATE 100 MG/1
100 TABLET ORAL 2 TIMES DAILY
Qty: 180 TABLET | Refills: 3 | Status: ON HOLD | OUTPATIENT
Start: 2022-06-16 | End: 2022-11-22 | Stop reason: HOSPADM

## 2022-06-16 RX ORDER — LISINOPRIL AND HYDROCHLOROTHIAZIDE 12.5; 2 MG/1; MG/1
1 TABLET ORAL 2 TIMES DAILY
Qty: 180 TABLET | Refills: 3 | Status: SHIPPED | OUTPATIENT
Start: 2022-06-16 | End: 2022-06-21 | Stop reason: SDUPTHER

## 2022-06-16 RX ORDER — METOPROLOL TARTRATE 100 MG/1
100 TABLET ORAL 2 TIMES DAILY
Qty: 180 TABLET | Refills: 3 | Status: SHIPPED | OUTPATIENT
Start: 2022-06-16 | End: 2022-06-22 | Stop reason: SDUPTHER

## 2022-06-16 NOTE — PROGRESS NOTES
"[facility] note        DATE OF SERVICE: 2022       PCP: Rigoberto Espinosa III, DO      CHIEF COMPLAINT:   Chief Complaint   Patient presents with    Chest Pain     With exertion    Shortness of Breath     With exertion.        HISTORY OF PRESENT ILLNESS:  Erlinda Cuevas is a 76 y.o. female with a PMH of   Past Medical History:   Diagnosis Date    Benign paroxysmal vertigo     Cardiomyopathy     Diabetes     Hypertension     Pulmonary HTN     Stroke     affected balance     Takotsubo cardiomyopathy     Valvular regurgitation      who presents for routine follow up. She reports having an episode of chest discomfort when she was outside working in her garden lifting heavy things. She states, "It didn't last long and it wasn't bad".  Chief Complaint   Patient presents with    Chest Pain     With exertion    Shortness of Breath     With exertion.            PAST MEDICAL HISTORY:  Past Medical History:   Diagnosis Date    Benign paroxysmal vertigo     Cardiomyopathy     Diabetes     Hypertension     Pulmonary HTN     Stroke     affected balance     Takotsubo cardiomyopathy     Valvular regurgitation        PAST SURGICAL HISTORY:  Past Surgical History:   Procedure Laterality Date    CATARACT EXTRACTION W/ INTRAOCULAR LENS  IMPLANT, BILATERAL      EYE SURGERY      HYSTERECTOMY         SOCIAL HISTORY:  Social History     Socioeconomic History    Marital status:    Tobacco Use    Smoking status: Former Smoker     Quit date:      Years since quittin.4    Smokeless tobacco: Never Used   Substance and Sexual Activity    Alcohol use: Never    Drug use: Never    Sexual activity: Not Currently       FAMILY HISTORY:  Family History   Problem Relation Age of Onset    Cancer Mother     Heart disease Father     Heart disease Sister     Diabetes Sister     Cancer Brother          ALLERGIES:  Review of patient's allergies indicates:   Allergen Reactions    " "Pravastatin Rash        MEDICATIONS:    Current Outpatient Medications:     aspirin 81 MG Chew, Take 81 mg by mouth once daily., Disp: , Rfl:     clopidogreL (PLAVIX) 75 mg tablet, Take 1 tablet (75 mg total) by mouth once daily., Disp: 30 tablet, Rfl: 11    vitamin E 400 UNIT capsule, Take 400 Units by mouth once daily., Disp: , Rfl:     clindamycin (CLEOCIN T) 1 % lotion, APPLY TOPICALLY TWICE DAILY TO THE AFFECTED AREA(S) OF BOTH ARMPITS, Disp: , Rfl:     hydrALAZINE (APRESOLINE) 10 MG tablet, Take 1 tablet (10 mg total) by mouth 2 (two) times daily., Disp: 180 tablet, Rfl: 0    lisinopriL-hydrochlorothiazide (PRINZIDE,ZESTORETIC) 20-12.5 mg per tablet, Take 1 tablet by mouth 2 (two) times a day., Disp: 180 tablet, Rfl: 3    metFORMIN (GLUCOPHAGE) 500 MG tablet, Take 2 tablets (1,000 mg total) by mouth 2 (two) times daily with meals. Take 2 tabs twice daily, Disp: 360 tablet, Rfl: 0    metoprolol tartrate (LOPRESSOR) 100 MG tablet, Take 1 tablet (100 mg total) by mouth 2 (two) times daily., Disp: 180 tablet, Rfl: 3    metoprolol tartrate (LOPRESSOR) 100 MG tablet, Take 1 tablet (100 mg total) by mouth 2 (two) times daily., Disp: 180 tablet, Rfl: 3    paroxetine (PAXIL) 10 MG tablet, Take 1 tablet (10 mg total) by mouth every morning., Disp: 90 tablet, Rfl: 0    pravastatin (PRAVACHOL) 20 MG tablet, Take 1 tablet (20 mg total) by mouth once daily. (Patient not taking: Reported on 6/13/2022), Disp: 90 tablet, Rfl: 3  Medications have been reviewed and reconciled.     PHYSICAL EXAM:  BP (!) 148/82 (BP Location: Left arm, Patient Position: Sitting)   Pulse (!) 59   Ht 4' 11" (1.499 m)   Wt 60.8 kg (134 lb)   SpO2 97%   BMI 27.06 kg/m²   Wt Readings from Last 3 Encounters:   06/13/22 60.8 kg (134 lb)   11/20/21 64.4 kg (142 lb)   11/15/21 64.4 kg (142 lb)      Body mass index is 27.06 kg/m².    Physical Exam  Vitals and nursing note reviewed.   Constitutional:       Appearance: Normal appearance. She " is obese.   HENT:      Head: Normocephalic and atraumatic.   Eyes:      Pupils: Pupils are equal, round, and reactive to light.   Neck:      Vascular: No carotid bruit.   Cardiovascular:      Rate and Rhythm: Normal rate and regular rhythm.      Pulses: Normal pulses.      Heart sounds: Murmur heard.      Comments: I/VI HOLGER RUSB  Pulmonary:      Effort: Pulmonary effort is normal.      Breath sounds: Normal breath sounds.   Abdominal:      General: Bowel sounds are normal.      Palpations: Abdomen is soft.   Musculoskeletal:      Cervical back: Neck supple.      Right lower leg: No edema.      Left lower leg: No edema.   Skin:     General: Skin is warm and dry.      Capillary Refill: Capillary refill takes less than 2 seconds.   Neurological:      General: No focal deficit present.      Mental Status: She is alert and oriented to person, place, and time.   Psychiatric:         Mood and Affect: Mood normal.         Behavior: Behavior normal.         LABS REVIEWED:  Lab Results   Component Value Date    WBC 6.70 09/01/2021    RBC 3.74 (L) 09/01/2021    HGB 12.3 09/01/2021    HCT 33.8 (L) 09/01/2021    MCV 90.4 09/01/2021    MCH 32.9 (H) 09/01/2021    MCHC 36.4 (H) 09/01/2021    RDW 11.8 09/01/2021     09/01/2021    MPV 10.4 09/01/2021    NRBC 0.0 09/01/2021    INR 0.92 09/07/2021     Lab Results   Component Value Date     (L) 09/07/2021    K 4.1 09/07/2021     09/07/2021    CO2 30 09/07/2021    BUN 18 09/07/2021    MG 1.8 09/01/2021     Lab Results   Component Value Date    AST 21 09/07/2021    ALT 34 09/07/2021     Lab Results   Component Value Date     (H) 09/07/2021     Lab Results   Component Value Date    CHOL 266 (H) 09/07/2021    HDL 40 09/07/2021    TRIG 421 (H) 09/07/2021    CHOLHDL 6.7 09/07/2021       CARDIAC STUDIES REVIEWED:EKG: sinus bradycardia; HR 51 bpm; NSIVB; no significant change from EKG done 11/12/2020        ASSESSMENT:   Patient Active Problem List   Diagnosis     Cerebrovascular accident (CVA)    Depression    Type 2 diabetes mellitus without complication, with long-term current use of insulin    Hypertension    Numbness and tingling of left leg    Dorsalgia, unspecified    Hemiparesis affecting left side as late effect of cerebrovascular accident (CVA)    Hyperlipidemia    Takotsubo cardiomyopathy    Bilateral carotid artery stenosis            Problem List Items Addressed This Visit        Cardiac/Vascular    Hypertension    Relevant Medications    metoprolol tartrate (LOPRESSOR) 100 MG tablet    lisinopriL-hydrochlorothiazide (PRINZIDE,ZESTORETIC) 20-12.5 mg per tablet    Takotsubo cardiomyopathy - Primary    Overview     EF normalized by echo 10/2017           Relevant Orders    EKG 12-lead (Completed)    Echo Saline Bubble? No      Other Visit Diagnoses     Undiagnosed cardiac murmurs        Relevant Orders    Echo Saline Bubble? No           PLAN:call results of tests  Patient declines stress test. She stats she has stopped the cholesterol medicine that neurology gave her due to a rash.   Orders Placed This Encounter   Procedures    EKG 12-lead     Standing Status:   Future     Number of Occurrences:   1     Standing Expiration Date:   6/13/2023    Echo Saline Bubble? No     Standing Status:   Future     Standing Expiration Date:   6/13/2023     Order Specific Question:   Limited Echo?     Answer:   No     Order Specific Question:   Saline Bubble?     Answer:   No     Order Specific Question:   Ultrasound enhancing contrast?     Answer:   No     Order Specific Question:   Physician to read study:     Answer:   FLOR[place] DE LA ROSA [[id number]]     Order Specific Question:   Adult congenital patient?     Answer:   No     Order Specific Question:   Oncology Patient?     Answer:   No     Order Specific Question:   Release to patient     Answer:   Immediate      RTC: 6 months

## 2022-06-17 ENCOUNTER — HOSPITAL ENCOUNTER (OUTPATIENT)
Dept: CARDIOLOGY | Facility: HOSPITAL | Age: 77
Discharge: HOME | End: 2022-06-17
Attending: NURSE PRACTITIONER
Payer: MEDICARE

## 2022-06-17 DIAGNOSIS — I51.81 TAKOTSUBO CARDIOMYOPATHY: ICD-10-CM

## 2022-06-17 DIAGNOSIS — R01.1 UNDIAGNOSED CARDIAC MURMURS: ICD-10-CM

## 2022-06-17 PROCEDURE — 93306 TTE W/DOPPLER COMPLETE: CPT | Mod: 26,,, | Performed by: INTERNAL MEDICINE

## 2022-06-17 PROCEDURE — 93306 ECHO (CUPID ONLY): ICD-10-PCS | Mod: 26,,, | Performed by: INTERNAL MEDICINE

## 2022-06-17 PROCEDURE — 93306 TTE W/DOPPLER COMPLETE: CPT

## 2022-06-21 DIAGNOSIS — I63.9 CEREBROVASCULAR ACCIDENT (CVA), UNSPECIFIED MECHANISM: ICD-10-CM

## 2022-06-21 DIAGNOSIS — I10 HYPERTENSION, UNSPECIFIED TYPE: ICD-10-CM

## 2022-06-21 RX ORDER — CLOPIDOGREL BISULFATE 75 MG/1
75 TABLET ORAL DAILY
Qty: 30 TABLET | Refills: 11 | Status: SHIPPED | OUTPATIENT
Start: 2022-06-21 | End: 2022-07-18 | Stop reason: SDUPTHER

## 2022-06-21 RX ORDER — LISINOPRIL AND HYDROCHLOROTHIAZIDE 12.5; 2 MG/1; MG/1
1 TABLET ORAL 2 TIMES DAILY
Qty: 180 TABLET | Refills: 3 | Status: ON HOLD | OUTPATIENT
Start: 2022-06-21 | End: 2022-11-22 | Stop reason: HOSPADM

## 2022-06-22 ENCOUNTER — TELEPHONE (OUTPATIENT)
Dept: PRIMARY CARE CLINIC | Facility: CLINIC | Age: 77
End: 2022-06-22
Payer: MEDICARE

## 2022-06-22 ENCOUNTER — OFFICE VISIT (OUTPATIENT)
Dept: PRIMARY CARE CLINIC | Facility: CLINIC | Age: 77
End: 2022-06-22
Payer: MEDICARE

## 2022-06-22 VITALS
SYSTOLIC BLOOD PRESSURE: 174 MMHG | OXYGEN SATURATION: 98 % | DIASTOLIC BLOOD PRESSURE: 90 MMHG | RESPIRATION RATE: 18 BRPM | BODY MASS INDEX: 27.01 KG/M2 | HEIGHT: 59 IN | HEART RATE: 60 BPM | WEIGHT: 134 LBS

## 2022-06-22 DIAGNOSIS — Z79.4 TYPE 2 DIABETES MELLITUS WITHOUT COMPLICATION, WITH LONG-TERM CURRENT USE OF INSULIN: ICD-10-CM

## 2022-06-22 DIAGNOSIS — K90.9 INTESTINAL MALABSORPTION, UNSPECIFIED TYPE: Primary | ICD-10-CM

## 2022-06-22 DIAGNOSIS — I69.354 HEMIPARESIS AFFECTING LEFT SIDE AS LATE EFFECT OF CEREBROVASCULAR ACCIDENT (CVA): ICD-10-CM

## 2022-06-22 DIAGNOSIS — E11.9 TYPE 2 DIABETES MELLITUS WITHOUT COMPLICATION, WITH LONG-TERM CURRENT USE OF INSULIN: ICD-10-CM

## 2022-06-22 DIAGNOSIS — Z91.199 NONCOMPLIANCE: ICD-10-CM

## 2022-06-22 DIAGNOSIS — F32.A DEPRESSION, UNSPECIFIED DEPRESSION TYPE: ICD-10-CM

## 2022-06-22 DIAGNOSIS — I10 HYPERTENSION, UNSPECIFIED TYPE: ICD-10-CM

## 2022-06-22 DIAGNOSIS — I63.9 CEREBROVASCULAR ACCIDENT (CVA), UNSPECIFIED MECHANISM: Chronic | ICD-10-CM

## 2022-06-22 PROCEDURE — 99214 OFFICE O/P EST MOD 30 MIN: CPT | Mod: ,,, | Performed by: FAMILY MEDICINE

## 2022-06-22 PROCEDURE — 99214 PR OFFICE/OUTPT VISIT, EST, LEVL IV, 30-39 MIN: ICD-10-PCS | Mod: ,,, | Performed by: FAMILY MEDICINE

## 2022-06-22 RX ORDER — HYDRALAZINE HYDROCHLORIDE 25 MG/1
25 TABLET, FILM COATED ORAL 2 TIMES DAILY
Qty: 180 TABLET | Refills: 4 | Status: SHIPPED | OUTPATIENT
Start: 2022-06-22 | End: 2022-12-13 | Stop reason: ALTCHOICE

## 2022-06-22 RX ORDER — PAROXETINE 10 MG/1
10 TABLET, FILM COATED ORAL EVERY MORNING
Qty: 90 TABLET | Refills: 3 | Status: SHIPPED | OUTPATIENT
Start: 2022-06-22 | End: 2023-10-16

## 2022-06-22 RX ORDER — METFORMIN HYDROCHLORIDE 500 MG/1
1000 TABLET ORAL 2 TIMES DAILY WITH MEALS
Qty: 360 TABLET | Refills: 3 | Status: SHIPPED | OUTPATIENT
Start: 2022-06-22 | End: 2023-11-07

## 2022-06-22 RX ORDER — EZETIMIBE 10 MG/1
10 TABLET ORAL DAILY
Qty: 90 TABLET | Refills: 3 | Status: SHIPPED | OUTPATIENT
Start: 2022-06-22 | End: 2022-12-13 | Stop reason: ALTCHOICE

## 2022-06-22 RX ORDER — HYDRALAZINE HYDROCHLORIDE 10 MG/1
10 TABLET, FILM COATED ORAL 2 TIMES DAILY
Qty: 180 TABLET | Refills: 3 | Status: SHIPPED | OUTPATIENT
Start: 2022-06-22 | End: 2022-06-22 | Stop reason: SDUPTHER

## 2022-06-22 RX ORDER — PIOGLITAZONEHYDROCHLORIDE 45 MG/1
45 TABLET ORAL DAILY
Qty: 90 TABLET | Refills: 3 | Status: ON HOLD | OUTPATIENT
Start: 2022-06-22 | End: 2022-11-22 | Stop reason: HOSPADM

## 2022-06-22 NOTE — PROGRESS NOTES
Subjective:      Patient ID: Erlinda Cuevas is a 76 y.o. female.    Chief Complaint: Follow-up, Diabetes, and Hypertension    Erlinda Cuevas a 76 y.o. female presents for follow up on all regular problems which are reviewed and discussed.   No visit x 9 months. Falling  Left leg numb dont know why[i suspect two cva's, dm, atherosclerosis]  I attempted ed. She has cane, walker[ not usin] encouraged her to use  Problem List Items Addressed This Visit        Neuro    Cerebrovascular accident (CVA) (Chronic)    Hemiparesis affecting left side as late effect of cerebrovascular accident (CVA)       Psychiatric    Depression    Relevant Medications    paroxetine (PAXIL) 10 MG tablet       Cardiac/Vascular    Hypertension       Endocrine    Type 2 diabetes mellitus without complication, with long-term current use of insulin    Relevant Medications    metFORMIN (GLUCOPHAGE) 500 MG tablet    Other Relevant Orders    Hemoglobin A1C    CBC Auto Differential    Comprehensive Metabolic Panel    TSH    Urinalysis    Vitamin B12 & Folate    Lipid Panel       Palliative Care    Noncompliance      Other Visit Diagnoses     Intestinal malabsorption, unspecified type    -  Primary    Relevant Orders    Vitamin B12 & Folate          Past Medical History:  Past Medical History:   Diagnosis Date    Benign paroxysmal vertigo     Cardiomyopathy     Diabetes     Hypertension     Pulmonary HTN     Stroke     affected balance     Takotsubo cardiomyopathy     Valvular regurgitation      Past Surgical History:   Procedure Laterality Date    CATARACT EXTRACTION W/ INTRAOCULAR LENS  IMPLANT, BILATERAL      EYE SURGERY      HYSTERECTOMY       Review of patient's allergies indicates:   Allergen Reactions    Pravastatin Rash     Current Outpatient Medications on File Prior to Visit   Medication Sig Dispense Refill    aspirin 81 MG Chew Take 81 mg by mouth once daily.      clopidogreL (PLAVIX) 75 mg tablet Take 1 tablet  (75 mg total) by mouth once daily. 30 tablet 11    lisinopriL-hydrochlorothiazide (PRINZIDE,ZESTORETIC) 20-12.5 mg per tablet Take 1 tablet by mouth 2 (two) times a day. 180 tablet 3    metoprolol tartrate (LOPRESSOR) 100 MG tablet Take 1 tablet (100 mg total) by mouth 2 (two) times daily. 180 tablet 3    pravastatin (PRAVACHOL) 20 MG tablet Take 1 tablet (20 mg total) by mouth once daily. 90 tablet 3    vitamin E 400 UNIT capsule Take 400 Units by mouth once daily.      [DISCONTINUED] hydrALAZINE (APRESOLINE) 10 MG tablet Take 1 tablet (10 mg total) by mouth 2 (two) times daily. 180 tablet 0    [DISCONTINUED] metFORMIN (GLUCOPHAGE) 500 MG tablet Take 2 tablets (1,000 mg total) by mouth 2 (two) times daily with meals. Take 2 tabs twice daily 360 tablet 0    [DISCONTINUED] metoprolol tartrate (LOPRESSOR) 100 MG tablet Take 1 tablet (100 mg total) by mouth 2 (two) times daily. 180 tablet 3    [DISCONTINUED] paroxetine (PAXIL) 10 MG tablet Take 1 tablet (10 mg total) by mouth every morning. 90 tablet 0    clindamycin (CLEOCIN T) 1 % lotion APPLY TOPICALLY TWICE DAILY TO THE AFFECTED AREA(S) OF BOTH ARMPITS       No current facility-administered medications on file prior to visit.     Social History     Socioeconomic History    Marital status:    Tobacco Use    Smoking status: Former Smoker     Quit date:      Years since quittin.4    Smokeless tobacco: Never Used   Substance and Sexual Activity    Alcohol use: Never    Drug use: Never    Sexual activity: Not Currently     Family History   Problem Relation Age of Onset    Cancer Mother     Heart disease Father     Heart disease Sister     Diabetes Sister     Cancer Brother        Review of Systems   Constitutional: Negative.  Negative for activity change, appetite change, chills and diaphoresis.   HENT: Negative.  Negative for congestion, ear pain, hearing loss and postnasal drip.    Eyes: Negative for itching.   Respiratory: Negative  "for chest tightness and shortness of breath.    Cardiovascular: Negative for chest pain.   Gastrointestinal: Negative for abdominal pain.   Endocrine: Negative for polydipsia.   Genitourinary: Negative for frequency.   Musculoskeletal: Positive for gait problem and myalgias. Negative for back pain.   Neurological: Positive for tremors and numbness. Negative for headaches.       Objective:     BP (!) 174/90 (BP Location: Left arm, Patient Position: Sitting, BP Method: Large (Manual))   Pulse 60   Resp 18   Ht 4' 11" (1.499 m)   Wt 60.8 kg (134 lb)   SpO2 98%   BMI 27.06 kg/m²     Physical Exam  Constitutional:       Appearance: Normal appearance. She is obese.   HENT:      Head: Normocephalic and atraumatic.      Right Ear: External ear normal.      Left Ear: External ear normal.      Nose: Nose normal.      Mouth/Throat:      Mouth: Mucous membranes are moist.      Pharynx: Oropharynx is clear.   Eyes:      Pupils: Pupils are equal, round, and reactive to light.   Cardiovascular:      Rate and Rhythm: Normal rate and regular rhythm.      Heart sounds: Normal heart sounds.   Pulmonary:      Effort: Pulmonary effort is normal.      Breath sounds: Normal breath sounds.   Abdominal:      Palpations: Abdomen is soft.   Musculoskeletal:      Cervical back: Normal range of motion and neck supple.   Skin:     General: Skin is warm and dry.   Neurological:      General: No focal deficit present.      Mental Status: She is alert and oriented to person, place, and time. Mental status is at baseline.   Psychiatric:         Mood and Affect: Mood normal.         Behavior: Behavior normal.         Thought Content: Thought content normal.         Judgment: Judgment normal.       Assessment:     1. Intestinal malabsorption, unspecified type    2. Type 2 diabetes mellitus without complication, with long-term current use of insulin    3. Depression, unspecified depression type    4. Cerebrovascular accident (CVA), unspecified " mechanism    5. Hemiparesis affecting left side as late effect of cerebrovascular accident (CVA)    6. Hypertension, unspecified type    7. Noncompliance        Plan:     Problem List Items Addressed This Visit        Neuro    Cerebrovascular accident (CVA) (Chronic)    Hemiparesis affecting left side as late effect of cerebrovascular accident (CVA)       Psychiatric    Depression    Relevant Medications    paroxetine (PAXIL) 10 MG tablet       Cardiac/Vascular    Hypertension       Endocrine    Type 2 diabetes mellitus without complication, with long-term current use of insulin    Relevant Medications    metFORMIN (GLUCOPHAGE) 500 MG tablet    Other Relevant Orders    Hemoglobin A1C    CBC Auto Differential    Comprehensive Metabolic Panel    TSH    Urinalysis    Vitamin B12 & Folate    Lipid Panel       Palliative Care    Noncompliance      Other Visit Diagnoses     Intestinal malabsorption, unspecified type    -  Primary    Relevant Orders    Vitamin B12 & Folate        No follow-ups on file.  6m fu [or sooner]  Lab today  Use cane, walker  Inc hydralazine to 25mg bid    I have discontinued Erlinda Cuevas's hydrALAZINE. I have also changed her hydrALAZINE. Additionally, I am having her maintain her aspirin, clindamycin, pravastatin, vitamin E, metoprolol tartrate, lisinopriL-hydrochlorothiazide, clopidogreL, metFORMIN, and paroxetine.    Erlinda was seen today for follow-up, diabetes and hypertension.    Diagnoses and all orders for this visit:    Intestinal malabsorption, unspecified type  -     Vitamin B12 & Folate; Future    Type 2 diabetes mellitus without complication, with long-term current use of insulin  -     metFORMIN (GLUCOPHAGE) 500 MG tablet; Take 2 tablets (1,000 mg total) by mouth 2 (two) times daily with meals. Take 2 tabs twice daily  -     Hemoglobin A1C; Future  -     CBC Auto Differential; Future  -     Comprehensive Metabolic Panel; Future  -     TSH; Future  -     Urinalysis; Future  -      Vitamin B12 & Folate; Future  -     Lipid Panel; Future    Depression, unspecified depression type  -     paroxetine (PAXIL) 10 MG tablet; Take 1 tablet (10 mg total) by mouth every morning.    Cerebrovascular accident (CVA), unspecified mechanism    Hemiparesis affecting left side as late effect of cerebrovascular accident (CVA)    Hypertension, unspecified type    Noncompliance    Other orders  -     Discontinue: hydrALAZINE (APRESOLINE) 10 MG tablet; Take 1 tablet (10 mg total) by mouth 2 (two) times daily.  -     hydrALAZINE (APRESOLINE) 25 MG tablet; Take 1 tablet (25 mg total) by mouth 2 (two) times daily.      Medications Ordered This Encounter   Medications    hydrALAZINE (APRESOLINE) 25 MG tablet     Sig: Take 1 tablet (25 mg total) by mouth 2 (two) times daily.     Dispense:  180 tablet     Refill:  4     .    metFORMIN (GLUCOPHAGE) 500 MG tablet     Sig: Take 2 tablets (1,000 mg total) by mouth 2 (two) times daily with meals. Take 2 tabs twice daily     Dispense:  360 tablet     Refill:  3    paroxetine (PAXIL) 10 MG tablet     Sig: Take 1 tablet (10 mg total) by mouth every morning.     Dispense:  90 tablet     Refill:  3     [unfilled]  Orders Placed This Encounter   Procedures    Hemoglobin A1C     Standing Status:   Future     Number of Occurrences:   1     Standing Expiration Date:   8/21/2023    CBC Auto Differential     Standing Status:   Future     Number of Occurrences:   1     Standing Expiration Date:   8/21/2023    Comprehensive Metabolic Panel     Standing Status:   Future     Number of Occurrences:   1     Standing Expiration Date:   8/21/2023    TSH     Standing Status:   Future     Number of Occurrences:   1     Standing Expiration Date:   8/21/2023    Urinalysis     Standing Status:   Future     Number of Occurrences:   1     Standing Expiration Date:   8/21/2023    Vitamin B12 & Folate     Standing Status:   Future     Number of Occurrences:   1     Standing Expiration Date:    8/21/2023    Lipid Panel     Standing Status:   Future     Number of Occurrences:   1     Standing Expiration Date:   8/21/2023

## 2022-06-22 NOTE — TELEPHONE ENCOUNTER
----- Message from Rigoberto Espinosa III DO sent at 6/22/2022  1:46 PM CDT -----  Chol and sugar too high  rx sent

## 2022-06-27 ENCOUNTER — TELEPHONE (OUTPATIENT)
Dept: NEUROLOGY | Facility: CLINIC | Age: 77
End: 2022-06-27
Payer: MEDICARE

## 2022-06-27 LAB
AORTIC VALVE CUSP SEPERATION: 1.76 CM
AV INDEX (PROSTH): 0.38
AV MEAN GRADIENT: 9 MMHG
AV PEAK GRADIENT: 18 MMHG
AV VALVE AREA: 1.1 CM2
CV ECHO LV RWT: 0.41 CM
DOP CALC AO PEAK VEL: 2.1 M/S
DOP CALC AO VTI: 47.63 CM
DOP CALC LVOT AREA: 2.9 CM2
DOP CALC LVOT DIAMETER: 1.91 CM
DOP CALC LVOT STROKE VOLUME: 52.38 CM3
DOP CALC MV VTI: 52.3 CM
DOP CALCLVOT PEAK VEL VTI: 18.29 CM
E WAVE DECELERATION TIME: 163 MSEC
E/A RATIO: 0.9
E/E' RATIO: 11.86 M/S
ECHO LV POSTERIOR WALL: 0.91 CM (ref 0.6–1.1)
EJECTION FRACTION: 50 %
FRACTIONAL SHORTENING: 38 % (ref 28–44)
INTERVENTRICULAR SEPTUM: 1.08 CM (ref 0.6–1.1)
IVC DIAMETER: 1.31 CM
LEFT ATRIUM SIZE: 3.06 CM
LEFT INTERNAL DIMENSION IN SYSTOLE: 2.73 CM (ref 2.1–4)
LEFT VENTRICLE DIASTOLIC VOLUME: 87.22 ML
LEFT VENTRICLE SYSTOLIC VOLUME: 27.26 ML
LEFT VENTRICULAR INTERNAL DIMENSION IN DIASTOLE: 4.39 CM (ref 3.5–6)
LEFT VENTRICULAR MASS: 146.27 G
LV LATERAL E/E' RATIO: 11.86 M/S
LV SEPTAL E/E' RATIO: 11.86 M/S
LVOT MG: 1 MMHG
MV MEAN GRADIENT: 2 MMHG
MV PEAK A VEL: 0.92 M/S
MV PEAK E VEL: 0.83 M/S
MV PEAK GRADIENT: 5 MMHG
MV STENOSIS PRESSURE HALF TIME: 123.8 MS
MV VALVE AREA BY CONTINUITY EQUATION: 1 CM2
MV VALVE AREA P 1/2 METHOD: 1.78 CM2
PISA TR MAX VEL: 2.92 M/S
RA WIDTH: 3.84 CM
RIGHT ATRIAL AREA: 16.6 CM2
RIGHT VENTRICULAR END-DIASTOLIC DIMENSION: 3.02 CM
RIGHT VENTRICULAR LENGTH IN DIASTOLE (APICAL 4-CHAMBER VIEW): 5.44 CM
RV MID DIAMA: 2.06 CM
TDI LATERAL: 0.07 M/S
TDI SEPTAL: 0.07 M/S
TDI: 0.07 M/S
TR MAX PG: 34 MMHG
TRICUSPID ANNULAR PLANE SYSTOLIC EXCURSION: 2.19 CM

## 2022-07-18 ENCOUNTER — OFFICE VISIT (OUTPATIENT)
Dept: NEUROLOGY | Facility: CLINIC | Age: 77
End: 2022-07-18
Payer: MEDICARE

## 2022-07-18 VITALS
HEIGHT: 59 IN | BODY MASS INDEX: 27.01 KG/M2 | RESPIRATION RATE: 18 BRPM | WEIGHT: 134 LBS | DIASTOLIC BLOOD PRESSURE: 72 MMHG | HEART RATE: 57 BPM | SYSTOLIC BLOOD PRESSURE: 140 MMHG | OXYGEN SATURATION: 98 %

## 2022-07-18 DIAGNOSIS — I10 HYPERTENSION, UNSPECIFIED TYPE: ICD-10-CM

## 2022-07-18 DIAGNOSIS — E78.5 HYPERLIPIDEMIA, UNSPECIFIED HYPERLIPIDEMIA TYPE: ICD-10-CM

## 2022-07-18 DIAGNOSIS — I69.354 HEMIPARESIS AFFECTING LEFT SIDE AS LATE EFFECT OF CEREBROVASCULAR ACCIDENT (CVA): Primary | ICD-10-CM

## 2022-07-18 DIAGNOSIS — I63.9 CEREBROVASCULAR ACCIDENT (CVA), UNSPECIFIED MECHANISM: ICD-10-CM

## 2022-07-18 PROCEDURE — 99214 OFFICE O/P EST MOD 30 MIN: CPT | Mod: PBBFAC | Performed by: NURSE PRACTITIONER

## 2022-07-18 PROCEDURE — 99213 OFFICE O/P EST LOW 20 MIN: CPT | Mod: S$PBB,,, | Performed by: NURSE PRACTITIONER

## 2022-07-18 PROCEDURE — 99213 PR OFFICE/OUTPT VISIT, EST, LEVL III, 20-29 MIN: ICD-10-PCS | Mod: S$PBB,,, | Performed by: NURSE PRACTITIONER

## 2022-07-18 RX ORDER — CLOPIDOGREL BISULFATE 75 MG/1
75 TABLET ORAL DAILY
Qty: 90 TABLET | Refills: 3 | Status: SHIPPED | OUTPATIENT
Start: 2022-07-18 | End: 2022-12-13 | Stop reason: SDUPTHER

## 2022-07-18 NOTE — PROGRESS NOTES
Subjective:       Patient ID: Erlinda Cuevas is a 76 y.o. female     Chief Complaint:    Chief Complaint   Patient presents with    Follow-up    Stroke        Allergies:  Pravastatin    Current Medications:    Outpatient Encounter Medications as of 7/18/2022   Medication Sig Dispense Refill    clindamycin (CLEOCIN T) 1 % lotion APPLY TOPICALLY TWICE DAILY TO THE AFFECTED AREA(S) OF BOTH ARMPITS      ezetimibe (ZETIA) 10 mg tablet Take 1 tablet (10 mg total) by mouth once daily. 90 tablet 3    hydrALAZINE (APRESOLINE) 25 MG tablet Take 1 tablet (25 mg total) by mouth 2 (two) times daily. 180 tablet 4    lisinopriL-hydrochlorothiazide (PRINZIDE,ZESTORETIC) 20-12.5 mg per tablet Take 1 tablet by mouth 2 (two) times a day. 180 tablet 3    metFORMIN (GLUCOPHAGE) 500 MG tablet Take 2 tablets (1,000 mg total) by mouth 2 (two) times daily with meals. Take 2 tabs twice daily 360 tablet 3    metoprolol tartrate (LOPRESSOR) 100 MG tablet Take 1 tablet (100 mg total) by mouth 2 (two) times daily. 180 tablet 3    paroxetine (PAXIL) 10 MG tablet Take 1 tablet (10 mg total) by mouth every morning. 90 tablet 3    pioglitazone (ACTOS) 45 MG tablet Take 1 tablet (45 mg total) by mouth once daily. 90 tablet 3    vitamin E 400 UNIT capsule Take 400 Units by mouth once daily.      [DISCONTINUED] aspirin 81 MG Chew Take 81 mg by mouth once daily.      [DISCONTINUED] clopidogreL (PLAVIX) 75 mg tablet Take 1 tablet (75 mg total) by mouth once daily. 30 tablet 11    clopidogreL (PLAVIX) 75 mg tablet Take 1 tablet (75 mg total) by mouth once daily. 90 tablet 3    [DISCONTINUED] pravastatin (PRAVACHOL) 20 MG tablet Take 1 tablet (20 mg total) by mouth once daily. 90 tablet 3     No facility-administered encounter medications on file as of 7/18/2022.       History of Present Illness  76-year-old white female followed in the clinic for a prior history of CVA in July of 2016, but more recent right thalamus CVA in  "September of 2021.  Last seen in clinic 8 months ago.     More recently was seen in the ED on 9/2/21 with worsening left sided weakness.  She had declined admit so we brought her into clinic.  She did have CTA head and neck as well as CT head without done in the ED, they did not indicate acute complications though the CTA showed bilateral 60% stenosis.  I obtained MRI brain that showed new CVA in the right thalamus.  She was already on ASA at time of CVA, thus we started her with plavix 75mg daily and plan to continue triple therapy for 90 days and then d/c ASA.  This was in December of 2021.  She has not followed back before today, but she is still on both the aspirin and plavix.  I did tell her to hold on the aspirin for now.  She has had a fall since last visit.  I did offer PT, but she declines at this time.    Her cholesterol labs continued elevated and I encouraged her to obtain and take cholesterol medication but she refused at that time, reported a friend of hers had bad experience on statin and would not take.  I noted more recently primary care had again noted elevated cholesterol as well as poorly controlled diabetes.  Patient reports that she was not able to tolerate the cholesterol medication, it caused her a rash and she does not wish to start a different one.    Again today I talked with her about this, she is open to trying pravastatin, in low dosing and if she tolerates we can increase her dosing.  Last visit was open to seeing Dr. Noel for evaluation of her noted 60% stenosis, but I do not see where she has had evaluation.  Her recent echocardiogram noted.     Does report continued "floater" in right vision.  She has now seen opthamology twice for exam with no reported complications.  We were unable to obtain ophthalmology records for review.    She does follow with Nicole Stewart in cardiology.         Review of Systems  Review of Systems   Constitutional: Negative for diaphoresis and fever. "   HENT: Negative for congestion, hearing loss and tinnitus.    Eyes: Negative for blurred vision, double vision, photophobia, discharge and redness.   Respiratory: Negative for cough and shortness of breath.    Cardiovascular: Negative for chest pain.   Gastrointestinal: Negative for abdominal pain, nausea and vomiting.   Musculoskeletal: Negative for back pain, joint pain, myalgias and neck pain.   Skin: Negative for itching and rash.   Neurological: Positive for dizziness, tingling and weakness. Negative for tremors, sensory change, speech change, focal weakness, seizures, loss of consciousness and headaches.   Psychiatric/Behavioral: Negative for depression, hallucinations and memory loss. The patient does not have insomnia.    All other systems reviewed and are negative.     Objective:     NEUROLOGICAL EXAMINATION:     MENTAL STATUS   Oriented to person, place, and time.   Registration: recalls 3 of 3 objects. Recall at 5 minutes: recalls 3 of 3 objects.   Attention: normal. Concentration: normal.   Speech: speech is normal   Level of consciousness: alert  Knowledge: good and consistent with education.   Normal comprehension.     CRANIAL NERVES     CN II   Visual fields full to confrontation.   Visual acuity: normal  Right visual field deficit: none  Left visual field deficit: none     CN III, IV, VI   Pupils are equal, round, and reactive to light.  Extraocular motions are normal.   Right pupil: Size: 3 mm. Shape: regular. Reactivity: brisk. Consensual response: intact. Accommodation: intact.   Left pupil: Size: 3 mm. Shape: regular. Reactivity: brisk. Consensual response: intact. Accommodation: intact.   CN III: no CN III palsy  CN VI: no CN VI palsy  Nystagmus: none   Diplopia: none  Upgaze: normal  Downgaze: normal  Conjugate gaze: present  Vestibulo-ocular reflex: present    CN V   Facial sensation intact.   Right facial sensation deficit: none  Left facial sensation deficit: none  Right corneal reflex:  normal  Left corneal reflex: normal    CN VII   Facial expression full, symmetric.   Right facial weakness: none  Left facial weakness: none  Right taste: normal  Left taste: normal    CN VIII   CN VIII normal.   Hearing: intact    CN IX, X   CN IX normal.   CN X normal.   Palate: symmetric    CN XI   CN XI normal.   Right sternocleidomastoid strength: normal  Left sternocleidomastoid strength: normal  Right trapezius strength: normal  Left trapezius strength: normal    CN XII   CN XII normal.   Tongue: not atrophic  Fasciculations: absent  Tongue deviation: none    MOTOR EXAM   Muscle bulk: normal  Overall muscle tone: normal  Right arm tone: normal  Left arm tone: normal  Right arm pronator drift: absent  Left arm pronator drift: absent  Right leg tone: normal  Left leg tone: normal    Strength   Right neck flexion: 5/5  Left neck flexion: 5/5  Right neck extension: 5/5  Left neck extension: 5/5  Right deltoid: 5/5  Left deltoid: 5/5  Right biceps: 5/5  Left biceps: 5/5  Right triceps: 5/5  Left triceps: 5/5  Right wrist flexion: 5/5  Left wrist flexion: 5/5  Right wrist extension: 5/5  Left wrist extension: 5/5  Right interossei: 5/5  Left interossei: 5/5  Right iliopsoas: 5/5  Left iliopsoas: 5/5  Right quadriceps: 5/5  Left quadriceps: 5/5  Right hamstrin/5  Left hamstrin/5  Right anterior tibial: 5/5  Left anterior tibial: 5/5  Right posterior tibial: 5/5  Left posterior tibial: 5/5  Right gastroc: 5/5  Left gastroc: 5/5    REFLEXES     Reflexes   Right brachioradialis: 2+  Left brachioradialis: 2+  Right biceps: 2+  Left biceps: 2+  Right triceps: 2+  Left triceps: 2+  Right patellar: 2+  Left patellar: 2+  Right achilles: 2+  Left achilles: 2+  Right plantar: normal  Left plantar: normal  Right Correa: absent  Left Correa: absent  Right ankle clonus: absent  Left ankle clonus: absent  Right pendular knee jerk: absent  Left pendular knee jerk: absent    SENSORY EXAM   Right arm light touch:  normal  Left arm light touch: decreased from wrist  Right leg light touch: normal  Left leg light touch: normal  Right arm vibration: normal  Left arm vibration: decreased from wrist  Right leg vibration: normal  Left leg vibration: normal  Right arm proprioception: normal  Left arm proprioception: decreased from wrist  Right leg proprioception: normal  Left leg proprioception: normal  Right arm pinprick: normal  Left arm pinprick: decreased from wrist  Right leg pinprick: normal  Left leg pinprick: normal  Graphesthesia: normal  Romberg: negative  Stereognosis: normal    GAIT AND COORDINATION     Gait  Gait: normal     Coordination   Finger to nose coordination: normal  Heel to shin coordination: normal  Tandem walking coordination: normal    Tremor   Resting tremor: absent  Intention tremor: absent  Action tremor: absent       Physical Exam  Vitals and nursing note reviewed.   Constitutional:       Appearance: Normal appearance.   HENT:      Head: Normocephalic.   Eyes:      Extraocular Movements: Extraocular movements intact and EOM normal.      Pupils: Pupils are equal, round, and reactive to light.   Cardiovascular:      Rate and Rhythm: Normal rate and regular rhythm.   Pulmonary:      Effort: Pulmonary effort is normal.      Breath sounds: Normal breath sounds.   Musculoskeletal:         General: No swelling or tenderness. Normal range of motion.      Cervical back: Normal range of motion and neck supple.      Right lower leg: No edema.      Left lower leg: No edema.   Skin:     General: Skin is warm and dry.      Coloration: Skin is not jaundiced.      Findings: No rash.   Neurological:      General: No focal deficit present.      Mental Status: She is alert and oriented to person, place, and time. Mental status is at baseline.      GCS: GCS eye subscore is 4. GCS verbal subscore is 5. GCS motor subscore is 6.      Cranial Nerves: No cranial nerve deficit.      Sensory: No sensory deficit.      Motor: Motor  function is intact. No weakness.      Coordination: Coordination is intact. Coordination normal. Finger-Nose-Finger Test, Heel to Shin Test and Romberg Test normal.      Gait: Gait is intact. Gait and tandem walk normal.      Deep Tendon Reflexes: Reflexes normal.      Reflex Scores:       Tricep reflexes are 2+ on the right side and 2+ on the left side.       Bicep reflexes are 2+ on the right side and 2+ on the left side.       Brachioradialis reflexes are 2+ on the right side and 2+ on the left side.       Patellar reflexes are 2+ on the right side and 2+ on the left side.       Achilles reflexes are 2+ on the right side and 2+ on the left side.  Psychiatric:         Mood and Affect: Mood normal.         Speech: Speech normal.         Behavior: Behavior normal.          Assessment:     Problem List Items Addressed This Visit        Neuro    Cerebrovascular accident (CVA) (Chronic)    Relevant Medications    clopidogreL (PLAVIX) 75 mg tablet    Hemiparesis affecting left side as late effect of cerebrovascular accident (CVA) - Primary       Cardiac/Vascular    Hypertension    Hyperlipidemia           Primary Diagnosis and ICD10  Hemiparesis affecting left side as late effect of cerebrovascular accident (CVA) [I69.354]    Plan:     There are no Patient Instructions on file for this visit.    Medications Discontinued During This Encounter   Medication Reason    aspirin 81 MG Chew     clopidogreL (PLAVIX) 75 mg tablet Reorder       Requested Prescriptions     Signed Prescriptions Disp Refills    clopidogreL (PLAVIX) 75 mg tablet 90 tablet 3     Sig: Take 1 tablet (75 mg total) by mouth once daily.       No orders of the defined types were placed in this encounter.

## 2022-07-18 NOTE — PATIENT INSTRUCTIONS
1. Continue current plavix 75mg daily    2. Stop aspirin    3. Declines physical therapy, but we can start at anytime if patient needs    4. Keep followup with primary care    Stroke risk modifiers:    1. Good sleep habits, recommend at least 7 hours total sleep daily  2. Continue management of blood pressure and cholesterol including medications, exercise, and good eating habits  3. Exercise as much as possible, recommend 5 days of the week for 30 minutes each day  4. Avoid smoking and alcohol  5. Go to the nearest emergency department immediately if any new or worsening weakness, speech difficulty, or numbness

## 2022-10-09 DIAGNOSIS — Z71.89 COMPLEX CARE COORDINATION: ICD-10-CM

## 2022-11-17 ENCOUNTER — HOSPITAL ENCOUNTER (INPATIENT)
Facility: HOSPITAL | Age: 77
LOS: 4 days | Discharge: HOME | DRG: 246 | End: 2022-11-21
Attending: EMERGENCY MEDICINE | Admitting: FAMILY MEDICINE
Payer: MEDICARE

## 2022-11-17 DIAGNOSIS — R07.9 CHEST PAIN: ICD-10-CM

## 2022-11-17 DIAGNOSIS — R06.00 DYSPNEA: ICD-10-CM

## 2022-11-17 DIAGNOSIS — I48.91 ATRIAL FIBRILLATION WITH RVR: ICD-10-CM

## 2022-11-17 DIAGNOSIS — F32.A DEPRESSION, UNSPECIFIED DEPRESSION TYPE: ICD-10-CM

## 2022-11-17 DIAGNOSIS — R79.89 ELEVATED TROPONIN: ICD-10-CM

## 2022-11-17 DIAGNOSIS — M54.9 DORSALGIA, UNSPECIFIED: ICD-10-CM

## 2022-11-17 DIAGNOSIS — I63.9 CEREBROVASCULAR ACCIDENT (CVA), UNSPECIFIED MECHANISM: Chronic | ICD-10-CM

## 2022-11-17 DIAGNOSIS — R29.6 FREQUENT FALLS: ICD-10-CM

## 2022-11-17 DIAGNOSIS — I48.91 ATRIAL FIBRILLATION: ICD-10-CM

## 2022-11-17 DIAGNOSIS — I10 SYSTOLIC HYPERTENSION: ICD-10-CM

## 2022-11-17 DIAGNOSIS — R20.2 NUMBNESS AND TINGLING OF LEFT LEG: ICD-10-CM

## 2022-11-17 DIAGNOSIS — I21.4 NSTEMI (NON-ST ELEVATED MYOCARDIAL INFARCTION): Primary | ICD-10-CM

## 2022-11-17 DIAGNOSIS — I65.23 BILATERAL CAROTID ARTERY STENOSIS: ICD-10-CM

## 2022-11-17 DIAGNOSIS — E87.6 HYPOKALEMIA: ICD-10-CM

## 2022-11-17 DIAGNOSIS — R20.0 NUMBNESS AND TINGLING OF LEFT LEG: ICD-10-CM

## 2022-11-17 DIAGNOSIS — I20.0 UNSTABLE ANGINA PECTORIS: ICD-10-CM

## 2022-11-17 DIAGNOSIS — Z91.199 NONCOMPLIANCE: ICD-10-CM

## 2022-11-17 DIAGNOSIS — I51.81 TAKOTSUBO CARDIOMYOPATHY: ICD-10-CM

## 2022-11-17 DIAGNOSIS — E78.5 HYPERLIPIDEMIA, UNSPECIFIED HYPERLIPIDEMIA TYPE: ICD-10-CM

## 2022-11-17 DIAGNOSIS — I69.354 HEMIPARESIS AFFECTING LEFT SIDE AS LATE EFFECT OF CEREBROVASCULAR ACCIDENT (CVA): ICD-10-CM

## 2022-11-17 LAB
ALBUMIN SERPL BCP-MCNC: 4 G/DL (ref 3.5–5)
ALBUMIN/GLOB SERPL: 1.2 {RATIO}
ALP SERPL-CCNC: 96 U/L (ref 55–142)
ALT SERPL W P-5'-P-CCNC: 35 U/L (ref 13–56)
AMYLASE SERPL-CCNC: 40 U/L (ref 25–115)
ANION GAP SERPL CALCULATED.3IONS-SCNC: 17 MMOL/L (ref 7–16)
APTT PPP: 23.6 SECONDS (ref 25.2–37.3)
AST SERPL W P-5'-P-CCNC: 32 U/L (ref 15–37)
BACTERIA #/AREA URNS HPF: ABNORMAL /HPF
BASOPHILS # BLD AUTO: 0.05 K/UL (ref 0–0.2)
BASOPHILS NFR BLD AUTO: 0.5 % (ref 0–1)
BILIRUB SERPL-MCNC: 0.5 MG/DL (ref ?–1.2)
BILIRUB UR QL STRIP: NEGATIVE
BUN SERPL-MCNC: 18 MG/DL (ref 7–18)
BUN/CREAT SERPL: 16 (ref 6–20)
CALCIUM SERPL-MCNC: 9.4 MG/DL (ref 8.5–10.1)
CHLORIDE SERPL-SCNC: 101 MMOL/L (ref 98–107)
CLARITY UR: CLEAR
CO2 SERPL-SCNC: 28 MMOL/L (ref 21–32)
COLOR UR: ABNORMAL
CREAT SERPL-MCNC: 1.15 MG/DL (ref 0.55–1.02)
DIFFERENTIAL METHOD BLD: ABNORMAL
EGFR (NO RACE VARIABLE) (RUSH/TITUS): 50 ML/MIN/1.73M²
EOSINOPHIL # BLD AUTO: 0.06 K/UL (ref 0–0.5)
EOSINOPHIL NFR BLD AUTO: 0.6 % (ref 1–4)
ERYTHROCYTE [DISTWIDTH] IN BLOOD BY AUTOMATED COUNT: 11.5 % (ref 11.5–14.5)
GLOBULIN SER-MCNC: 3.3 G/DL (ref 2–4)
GLUCOSE SERPL-MCNC: 234 MG/DL (ref 74–106)
GLUCOSE SERPL-MCNC: 349 MG/DL (ref 70–105)
GLUCOSE UR STRIP-MCNC: 100 MG/DL
HCT VFR BLD AUTO: 39.4 % (ref 38–47)
HGB BLD-MCNC: 13.9 G/DL (ref 12–16)
IMM GRANULOCYTES # BLD AUTO: 0.1 K/UL (ref 0–0.04)
IMM GRANULOCYTES NFR BLD: 1 % (ref 0–0.4)
INR BLD: 0.98
KETONES UR STRIP-SCNC: NEGATIVE MG/DL
LACTATE SERPL-SCNC: 2.6 MMOL/L (ref 0.4–2)
LACTATE SERPL-SCNC: 5.2 MMOL/L (ref 0.4–2)
LEUKOCYTE ESTERASE UR QL STRIP: NEGATIVE
LIPASE SERPL-CCNC: 197 U/L (ref 73–393)
LYMPHOCYTES # BLD AUTO: 2.17 K/UL (ref 1–4.8)
LYMPHOCYTES NFR BLD AUTO: 21.9 % (ref 27–41)
MAGNESIUM SERPL-MCNC: 1.7 MG/DL (ref 1.7–2.3)
MCH RBC QN AUTO: 32.6 PG (ref 27–31)
MCHC RBC AUTO-ENTMCNC: 35.3 G/DL (ref 32–36)
MCV RBC AUTO: 92.3 FL (ref 80–96)
MONOCYTES # BLD AUTO: 0.84 K/UL (ref 0–0.8)
MONOCYTES NFR BLD AUTO: 8.5 % (ref 2–6)
MPC BLD CALC-MCNC: 9.6 FL (ref 9.4–12.4)
MUCOUS THREADS #/AREA URNS HPF: ABNORMAL /HPF
NEUTROPHILS # BLD AUTO: 6.67 K/UL (ref 1.8–7.7)
NEUTROPHILS NFR BLD AUTO: 67.5 % (ref 53–65)
NITRITE UR QL STRIP: NEGATIVE
NRBC # BLD AUTO: 0 X10E3/UL
NRBC, AUTO (.00): 0 %
PH UR STRIP: 7 PH UNITS
PLATELET # BLD AUTO: 291 K/UL (ref 150–400)
POTASSIUM SERPL-SCNC: 4.8 MMOL/L (ref 3.5–5.1)
PROT SERPL-MCNC: 7.3 G/DL (ref 6.4–8.2)
PROT UR QL STRIP: 30
PROTHROMBIN TIME: 12.6 SECONDS (ref 11.7–14.7)
RBC # BLD AUTO: 4.27 M/UL (ref 4.2–5.4)
RBC # UR STRIP: NEGATIVE /UL
RBC #/AREA URNS HPF: ABNORMAL /HPF
SODIUM SERPL-SCNC: 141 MMOL/L (ref 136–145)
SP GR UR STRIP: 1.02
SQUAMOUS #/AREA URNS LPF: ABNORMAL /LPF
TRICHOMONAS #/AREA URNS HPF: ABNORMAL /HPF
TROPONIN I SERPL HS-MCNC: 122.2 PG/ML
TROPONIN I SERPL HS-MCNC: 383.4 PG/ML
UROBILINOGEN UR STRIP-ACNC: NORMAL MG/DL
WBC # BLD AUTO: 9.89 K/UL (ref 4.5–11)
WBC #/AREA URNS HPF: ABNORMAL /HPF
YEAST #/AREA URNS HPF: ABNORMAL /HPF

## 2022-11-17 PROCEDURE — 93005 ELECTROCARDIOGRAM TRACING: CPT

## 2022-11-17 PROCEDURE — 84484 ASSAY OF TROPONIN QUANT: CPT | Performed by: EMERGENCY MEDICINE

## 2022-11-17 PROCEDURE — 81001 URINALYSIS AUTO W/SCOPE: CPT | Performed by: EMERGENCY MEDICINE

## 2022-11-17 PROCEDURE — 82150 ASSAY OF AMYLASE: CPT | Performed by: EMERGENCY MEDICINE

## 2022-11-17 PROCEDURE — 83605 ASSAY OF LACTIC ACID: CPT | Performed by: EMERGENCY MEDICINE

## 2022-11-17 PROCEDURE — 80053 COMPREHEN METABOLIC PANEL: CPT | Performed by: EMERGENCY MEDICINE

## 2022-11-17 PROCEDURE — 83690 ASSAY OF LIPASE: CPT | Performed by: EMERGENCY MEDICINE

## 2022-11-17 PROCEDURE — 99285 EMERGENCY DEPT VISIT HI MDM: CPT | Mod: 25

## 2022-11-17 PROCEDURE — 99284 PR EMERGENCY DEPT VISIT,LEVEL IV: ICD-10-PCS | Mod: ,,, | Performed by: EMERGENCY MEDICINE

## 2022-11-17 PROCEDURE — 99223 1ST HOSP IP/OBS HIGH 75: CPT | Mod: ,,, | Performed by: HOSPITALIST

## 2022-11-17 PROCEDURE — 93010 ELECTROCARDIOGRAM REPORT: CPT | Mod: ,,, | Performed by: INTERNAL MEDICINE

## 2022-11-17 PROCEDURE — 99284 EMERGENCY DEPT VISIT MOD MDM: CPT | Mod: ,,, | Performed by: EMERGENCY MEDICINE

## 2022-11-17 PROCEDURE — 96374 THER/PROPH/DIAG INJ IV PUSH: CPT

## 2022-11-17 PROCEDURE — 85025 COMPLETE CBC W/AUTO DIFF WBC: CPT | Performed by: EMERGENCY MEDICINE

## 2022-11-17 PROCEDURE — 11000001 HC ACUTE MED/SURG PRIVATE ROOM

## 2022-11-17 PROCEDURE — 96361 HYDRATE IV INFUSION ADD-ON: CPT

## 2022-11-17 PROCEDURE — 99223 PR INITIAL HOSPITAL CARE,LEVL III: ICD-10-PCS | Mod: ,,, | Performed by: HOSPITALIST

## 2022-11-17 PROCEDURE — 63600175 PHARM REV CODE 636 W HCPCS: Performed by: HOSPITALIST

## 2022-11-17 PROCEDURE — 85610 PROTHROMBIN TIME: CPT | Performed by: EMERGENCY MEDICINE

## 2022-11-17 PROCEDURE — 82962 GLUCOSE BLOOD TEST: CPT

## 2022-11-17 PROCEDURE — 36415 COLL VENOUS BLD VENIPUNCTURE: CPT | Performed by: EMERGENCY MEDICINE

## 2022-11-17 PROCEDURE — 83735 ASSAY OF MAGNESIUM: CPT | Performed by: EMERGENCY MEDICINE

## 2022-11-17 PROCEDURE — 25000003 PHARM REV CODE 250: Performed by: HOSPITALIST

## 2022-11-17 PROCEDURE — 93010 EKG 12-LEAD: ICD-10-PCS | Mod: ,,, | Performed by: INTERNAL MEDICINE

## 2022-11-17 PROCEDURE — 25000003 PHARM REV CODE 250: Performed by: EMERGENCY MEDICINE

## 2022-11-17 PROCEDURE — 85730 THROMBOPLASTIN TIME PARTIAL: CPT | Performed by: EMERGENCY MEDICINE

## 2022-11-17 RX ORDER — INSULIN ASPART 100 [IU]/ML
1-10 INJECTION, SOLUTION INTRAVENOUS; SUBCUTANEOUS
Status: DISCONTINUED | OUTPATIENT
Start: 2022-11-17 | End: 2022-11-22 | Stop reason: HOSPADM

## 2022-11-17 RX ORDER — LISINOPRIL AND HYDROCHLOROTHIAZIDE 12.5; 2 MG/1; MG/1
1 TABLET ORAL 2 TIMES DAILY
Status: DISCONTINUED | OUTPATIENT
Start: 2022-11-17 | End: 2022-11-17

## 2022-11-17 RX ORDER — METOPROLOL TARTRATE 50 MG/1
100 TABLET ORAL 2 TIMES DAILY
Status: DISCONTINUED | OUTPATIENT
Start: 2022-11-17 | End: 2022-11-22

## 2022-11-17 RX ORDER — PAROXETINE 10 MG/1
10 TABLET, FILM COATED ORAL EVERY MORNING
Status: DISCONTINUED | OUTPATIENT
Start: 2022-11-18 | End: 2022-11-22 | Stop reason: HOSPADM

## 2022-11-17 RX ORDER — LISINOPRIL 20 MG/1
20 TABLET ORAL 2 TIMES DAILY
Status: DISCONTINUED | OUTPATIENT
Start: 2022-11-17 | End: 2022-11-22 | Stop reason: HOSPADM

## 2022-11-17 RX ORDER — PIOGLITAZONEHYDROCHLORIDE 15 MG/1
45 TABLET ORAL DAILY
Status: DISCONTINUED | OUTPATIENT
Start: 2022-11-18 | End: 2022-11-22

## 2022-11-17 RX ORDER — NALOXONE HCL 0.4 MG/ML
0.02 VIAL (ML) INJECTION
Status: DISCONTINUED | OUTPATIENT
Start: 2022-11-17 | End: 2022-11-22 | Stop reason: HOSPADM

## 2022-11-17 RX ORDER — ONDANSETRON 2 MG/ML
4 INJECTION INTRAMUSCULAR; INTRAVENOUS EVERY 8 HOURS PRN
Status: DISCONTINUED | OUTPATIENT
Start: 2022-11-17 | End: 2022-11-18

## 2022-11-17 RX ORDER — IBUPROFEN 200 MG
16 TABLET ORAL
Status: DISCONTINUED | OUTPATIENT
Start: 2022-11-17 | End: 2022-11-22 | Stop reason: HOSPADM

## 2022-11-17 RX ORDER — HEPARIN SODIUM,PORCINE/D5W 25000/250
0-40 INTRAVENOUS SOLUTION INTRAVENOUS CONTINUOUS
Status: DISCONTINUED | OUTPATIENT
Start: 2022-11-17 | End: 2022-11-18

## 2022-11-17 RX ORDER — ACETAMINOPHEN 325 MG/1
650 TABLET ORAL EVERY 4 HOURS PRN
Status: DISCONTINUED | OUTPATIENT
Start: 2022-11-17 | End: 2022-11-18

## 2022-11-17 RX ORDER — GLUCAGON 1 MG
1 KIT INJECTION
Status: DISCONTINUED | OUTPATIENT
Start: 2022-11-17 | End: 2022-11-22 | Stop reason: HOSPADM

## 2022-11-17 RX ORDER — IBUPROFEN 200 MG
24 TABLET ORAL
Status: DISCONTINUED | OUTPATIENT
Start: 2022-11-17 | End: 2022-11-22 | Stop reason: HOSPADM

## 2022-11-17 RX ORDER — EZETIMIBE 10 MG/1
10 TABLET ORAL DAILY
Status: DISCONTINUED | OUTPATIENT
Start: 2022-11-18 | End: 2022-11-22 | Stop reason: HOSPADM

## 2022-11-17 RX ORDER — SODIUM CHLORIDE 0.9 % (FLUSH) 0.9 %
10 SYRINGE (ML) INJECTION EVERY 12 HOURS PRN
Status: DISCONTINUED | OUTPATIENT
Start: 2022-11-17 | End: 2022-11-22 | Stop reason: HOSPADM

## 2022-11-17 RX ORDER — PROMETHAZINE HYDROCHLORIDE 25 MG/1
25 TABLET ORAL EVERY 6 HOURS PRN
Status: DISCONTINUED | OUTPATIENT
Start: 2022-11-17 | End: 2022-11-22 | Stop reason: HOSPADM

## 2022-11-17 RX ORDER — CLOPIDOGREL BISULFATE 75 MG/1
75 TABLET ORAL DAILY
Status: DISCONTINUED | OUTPATIENT
Start: 2022-11-18 | End: 2022-11-22 | Stop reason: HOSPADM

## 2022-11-17 RX ORDER — DILTIAZEM HYDROCHLORIDE 5 MG/ML
20 INJECTION INTRAVENOUS
Status: COMPLETED | OUTPATIENT
Start: 2022-11-17 | End: 2022-11-17

## 2022-11-17 RX ORDER — PANTOPRAZOLE SODIUM 40 MG/1
40 TABLET, DELAYED RELEASE ORAL DAILY
Status: DISCONTINUED | OUTPATIENT
Start: 2022-11-18 | End: 2022-11-22 | Stop reason: HOSPADM

## 2022-11-17 RX ORDER — HYDRALAZINE HYDROCHLORIDE 25 MG/1
25 TABLET, FILM COATED ORAL 2 TIMES DAILY
Status: DISCONTINUED | OUTPATIENT
Start: 2022-11-17 | End: 2022-11-22 | Stop reason: HOSPADM

## 2022-11-17 RX ORDER — HYDROCHLOROTHIAZIDE 12.5 MG/1
12.5 TABLET ORAL 2 TIMES DAILY
Status: DISCONTINUED | OUTPATIENT
Start: 2022-11-17 | End: 2022-11-21

## 2022-11-17 RX ADMIN — HEPARIN SODIUM 12 UNITS/KG/HR: 10000 INJECTION, SOLUTION INTRAVENOUS at 10:11

## 2022-11-17 RX ADMIN — HYDROCHLOROTHIAZIDE 12.5 MG: 12.5 TABLET ORAL at 08:11

## 2022-11-17 RX ADMIN — SODIUM CHLORIDE 1000 ML: 9 INJECTION, SOLUTION INTRAVENOUS at 02:11

## 2022-11-17 RX ADMIN — DILTIAZEM HYDROCHLORIDE 20 MG: 5 INJECTION INTRAVENOUS at 03:11

## 2022-11-17 RX ADMIN — METOPROLOL TARTRATE 100 MG: 50 TABLET, FILM COATED ORAL at 08:11

## 2022-11-17 RX ADMIN — INSULIN ASPART 4 UNITS: 100 INJECTION, SOLUTION INTRAVENOUS; SUBCUTANEOUS at 09:11

## 2022-11-17 RX ADMIN — LISINOPRIL 20 MG: 20 TABLET ORAL at 08:11

## 2022-11-17 NOTE — Clinical Note
55 ml of contrast were injected throughout the case. 45 mL of contrast was the total wasted during the case. 100 mL was the total amount used during the case.

## 2022-11-17 NOTE — Clinical Note
125 ml of contrast were injected throughout the case. 175 mL of contrast was the total wasted during the case. 300 mL was the total amount used during the case.

## 2022-11-17 NOTE — Clinical Note
The catheter was inserted over the wire into the left ventricle. Hemodynamics were performed.  and Pullback was recorded.

## 2022-11-17 NOTE — Clinical Note
The catheter was inserted over the wire into the ostial  left coronary artery. An angiography was performed of the left coronary arteries.

## 2022-11-17 NOTE — Clinical Note
The catheter was inserted over the wire into the ostial  left coronary artery. An angiography was performed of the left coronary arteries. Multiple views were taken.

## 2022-11-17 NOTE — ED PROVIDER NOTES
Encounter Date: 2022    SCRIBE #1 NOTE: I, Ernestina Estrella, am scribing for, and in the presence of,  David Flores MD. I have scribed the entire note.     History     Chief Complaint   Patient presents with    Irregular Heart Beat    Chest Pain     77 y.o. female presents to the emergency department with complaints of intermittent chest pain and shortness of breath. Patient stated she has a cardiomyopathy. Patient had a stroke 2021 and balance was affected. Patient stated she got up out of bed and waited a minute before getting up. Patient stated she drank some coffee and threw it back up. Patient stated heavy lifting aggravates heart and breathing. No other symptoms were reported.       The history is provided by the patient. No  was used.   Review of patient's allergies indicates:   Allergen Reactions    Pravastatin Rash     Past Medical History:   Diagnosis Date    Benign paroxysmal vertigo     Cardiomyopathy     Diabetes     Hypertension     Pulmonary HTN     Stroke     affected balance     Takotsubo cardiomyopathy     Valvular regurgitation      Past Surgical History:   Procedure Laterality Date    CATARACT EXTRACTION W/ INTRAOCULAR LENS  IMPLANT, BILATERAL      EYE SURGERY      HYSTERECTOMY       Family History   Problem Relation Age of Onset    Cancer Mother     Heart disease Father     Heart disease Sister     Diabetes Sister     Cancer Brother      Social History     Tobacco Use    Smoking status: Former     Types: Cigarettes     Quit date:      Years since quittin.9    Smokeless tobacco: Never   Substance Use Topics    Alcohol use: Never    Drug use: Never     Review of Systems   Constitutional: Negative.    HENT: Negative.     Eyes: Negative.    Respiratory:  Positive for shortness of breath (intermittent).    Cardiovascular:  Positive for palpitations.   Gastrointestinal:  Positive for nausea and vomiting.   Endocrine: Negative.    Genitourinary:  Negative.    Skin: Negative.    Allergic/Immunologic: Negative.    Neurological: Negative.    Hematological: Negative.    Psychiatric/Behavioral: Negative.     All other systems reviewed and are negative.    Physical Exam     Initial Vitals [11/17/22 1017]   BP Pulse Resp Temp SpO2   126/82 108 16 97.7 °F (36.5 °C) (!) 92 %      MAP       --         Physical Exam    Vitals reviewed.  Constitutional: She appears well-developed and well-nourished. No distress.   HENT:   Head: Normocephalic.   Eyes: Conjunctivae are normal. Pupils are equal, round, and reactive to light.   Cardiovascular:  Normal heart sounds and intact distal pulses. An irregular rhythm present.   Tachycardia present.         Pulmonary/Chest: Breath sounds normal.   Abdominal: Abdomen is soft. Bowel sounds are normal.     Neurological: She is alert and oriented to person, place, and time.   Skin: Skin is warm and dry.   Psychiatric: She has a normal mood and affect.       ED Course   Procedures  Labs Reviewed   COMPREHENSIVE METABOLIC PANEL - Abnormal; Notable for the following components:       Result Value    Anion Gap 17 (*)     Glucose 234 (*)     Creatinine 1.15 (*)     eGFR 50 (*)     All other components within normal limits   LACTIC ACID, PLASMA - Abnormal; Notable for the following components:    Lactic Acid 5.2 (*)     All other components within normal limits   TROPONIN I - Abnormal; Notable for the following components:    Troponin I High Sensitivity 122.2 (*)     All other components within normal limits   APTT - Abnormal; Notable for the following components:    PTT 23.6 (*)     All other components within normal limits   URINALYSIS, REFLEX TO URINE CULTURE - Abnormal; Notable for the following components:    Protein, UA 30 (*)     Glucose,  (*)     All other components within normal limits   CBC WITH DIFFERENTIAL - Abnormal; Notable for the following components:    MCH 32.6 (*)     Neutrophils % 67.5 (*)     Lymphocytes % 21.9 (*)      Monocytes % 8.5 (*)     Eosinophils % 0.6 (*)     Immature Granulocytes % 1.0 (*)     Monocytes, Absolute 0.84 (*)     Immature Granulocytes, Absolute 0.10 (*)     All other components within normal limits   URINALYSIS, MICROSCOPIC - Abnormal; Notable for the following components:    Yeast, UA Few (*)     Squamous Epithelial Cells, UA Occasional (*)     All other components within normal limits   AMYLASE - Normal   LIPASE - Normal   PROTIME-INR - Normal   MAGNESIUM - Normal   CBC W/ AUTO DIFFERENTIAL    Narrative:     The following orders were created for panel order CBC auto differential.  Procedure                               Abnormality         Status                     ---------                               -----------         ------                     CBC with Differential[[id number]]        Abnormal            Final result                 Please view results for these tests on the individual orders.   LACTIC ACID, PLASMA   TROPONIN I        ECG Results              EKG 12-lead (In process)  Result time 11/17/22 12:28:01      In process by Interface, Lab In [facility] (11/17/22 12:28:01)                   Narrative:    Test Reason : R06.00,    Vent. Rate : 126 BPM     Atrial Rate : 000 BPM     P-R Int : 000 ms          QRS Dur : 150 ms      QT Int : 350 ms       P-R-T Axes : 000 -61 138 degrees     QTc Int : 466 ms    Atrial fibrillation  with rapid ventricular response  Marked left axis deviation  IV conduction defect  Possible anterior infarct - age undetermined  Left ventricular hypertrophy  Inferior/lateral ST-T abnormality  may be due to the hypertrophy and/or  ischemia  Abnormal ECG      Referred By: [email or web address]   SELF           Confirmed By:                                   Imaging Results              X-Ray Chest AP Portable (Final result)  Result time 11/17/22 12:41:39      Final result by Haroon Arana DO (11/17/22 12:41:39)                   Impression:      No acute pulmonary  disease      Electronically signed by: Haroon Arana  Date:    11/17/2022  Time:    12:41               Narrative:    EXAMINATION:  XR CHEST AP PORTABLE    CLINICAL HISTORY:  Chest pain, unspecified    TECHNIQUE:  XR CHEST AP PORTABLE    COMPARISON:  2021    FINDINGS:  No lines or tubes.    Lungs are clear.    Normal pleura.    Cardiac silhouette is similar to comparison exam.    No obvious acute bone findings.                                       Medications   diltiaZEM injection 20 mg (has no administration in time range)   sodium chloride 0.9% bolus 1,000 mL (1,000 mLs Intravenous New Bag 11/17/22 1416)                Attending Attestation:           Physician Attestation for Scribe:  Physician Attestation Statement for Scribe #1: I, David Flores MD, reviewed documentation, as scribed by Ernestina Estrella in my presence, and it is both accurate and complete.                        Clinical Impression:   Final diagnoses:  [R06.00] Dyspnea  [R07.9] Chest pain  [I48.91] Atrial fibrillation with RVR (Primary)        ED Disposition Condition    Admit Stable                David Flores MD  11/17/22 6922

## 2022-11-17 NOTE — Clinical Note
Patient transported to [facility] via stretcher.  Patient tolerated well.  Bedside report and assessment with ADORE Duong.  Puncture site stable.  No signs of bleeding or hematoma.  Dressing in place.  Pulse palpable.  VS stable.  Patient education complete.

## 2022-11-17 NOTE — Clinical Note
The right groin was prepped. The site was prepped with ChloraPrep. The site was clipped. The patient was positioned supine. The patient was secured using safety straps and with ulnar pads.

## 2022-11-18 PROBLEM — R79.89 ELEVATED TROPONIN: Status: ACTIVE | Noted: 2022-11-18

## 2022-11-18 LAB
ANION GAP SERPL CALCULATED.3IONS-SCNC: 12 MMOL/L (ref 7–16)
AORTIC ROOT ANNULUS: 2.2 CM
AORTIC VALVE CUSP SEPERATION: 1.77 CM
APTT PPP: 30.2 SECONDS (ref 25.2–37.3)
APTT PPP: 69.3 SECONDS (ref 25.2–37.3)
AV INDEX (PROSTH): 0.72
AV MEAN GRADIENT: 2 MMHG
AV PEAK GRADIENT: 3 MMHG
AV VALVE AREA: 1.84 CM2
AV VELOCITY RATIO: 0.67
BASOPHILS # BLD AUTO: 0.04 K/UL (ref 0–0.2)
BASOPHILS NFR BLD AUTO: 0.6 % (ref 0–1)
BSA FOR ECHO PROCEDURE: 1.57 M2
BUN SERPL-MCNC: 20 MG/DL (ref 7–18)
BUN/CREAT SERPL: 19 (ref 6–20)
CALCIUM SERPL-MCNC: 8.8 MG/DL (ref 8.5–10.1)
CATH EF QUANTITATIVE: 45 %
CHLORIDE SERPL-SCNC: 104 MMOL/L (ref 98–107)
CHOLEST SERPL-MCNC: 201 MG/DL (ref 0–200)
CHOLEST/HDLC SERPL: 6.5 {RATIO}
CO2 SERPL-SCNC: 29 MMOL/L (ref 21–32)
CREAT SERPL-MCNC: 1.08 MG/DL (ref 0.55–1.02)
CV ECHO LV RWT: 0.57 CM
D DIMER PPP FEU-MCNC: 1.75 ΜG/ML (ref 0–0.47)
DIFFERENTIAL METHOD BLD: ABNORMAL
DOP CALC AO PEAK VEL: 0.9 M/S
DOP CALC AO VTI: 18 CM
DOP CALC LVOT AREA: 2.5 CM2
DOP CALC LVOT DIAMETER: 1.8 CM
DOP CALC LVOT PEAK VEL: 0.6 M/S
DOP CALC LVOT STROKE VOLUME: 33.06 CM3
DOP CALCLVOT PEAK VEL VTI: 13 CM
E WAVE DECELERATION TIME: 150 MSEC
ECHO EF ESTIMATED: 35 %
ECHO LV POSTERIOR WALL: 1.24 CM (ref 0.6–1.1)
EGFR (NO RACE VARIABLE) (RUSH/TITUS): 54 ML/MIN/1.73M²
EJECTION FRACTION: 35 %
EOSINOPHIL # BLD AUTO: 0.14 K/UL (ref 0–0.5)
EOSINOPHIL NFR BLD AUTO: 2.3 % (ref 1–4)
ERYTHROCYTE [DISTWIDTH] IN BLOOD BY AUTOMATED COUNT: 11.5 % (ref 11.5–14.5)
FRACTIONAL SHORTENING: 16 % (ref 28–44)
GLUCOSE SERPL-MCNC: 183 MG/DL (ref 70–105)
GLUCOSE SERPL-MCNC: 206 MG/DL (ref 70–105)
GLUCOSE SERPL-MCNC: 207 MG/DL (ref 74–106)
GLUCOSE SERPL-MCNC: 211 MG/DL (ref 70–105)
GLUCOSE SERPL-MCNC: 286 MG/DL (ref 70–105)
HCT VFR BLD AUTO: 34.1 % (ref 38–47)
HDLC SERPL-MCNC: 31 MG/DL (ref 40–60)
HGB BLD-MCNC: 11.9 G/DL (ref 12–16)
IMM GRANULOCYTES # BLD AUTO: 0.06 K/UL (ref 0–0.04)
IMM GRANULOCYTES NFR BLD: 1 % (ref 0–0.4)
INTERVENTRICULAR SEPTUM: 1.14 CM (ref 0.6–1.1)
IVC OSTIUM: 1.3 CM
LEFT ATRIUM SIZE: 3.1 CM
LEFT INTERNAL DIMENSION IN SYSTOLE: 3.69 CM (ref 2.1–4)
LEFT VENTRICLE DIASTOLIC VOLUME INDEX: 56.36 ML/M2
LEFT VENTRICLE DIASTOLIC VOLUME: 86.8 ML
LEFT VENTRICLE MASS INDEX: 122 G/M2
LEFT VENTRICLE SYSTOLIC VOLUME INDEX: 37.5 ML/M2
LEFT VENTRICLE SYSTOLIC VOLUME: 57.8 ML
LEFT VENTRICULAR INTERNAL DIMENSION IN DIASTOLE: 4.38 CM (ref 3.5–6)
LEFT VENTRICULAR MASS: 187.71 G
LVOT MG: 1 MMHG
LYMPHOCYTES # BLD AUTO: 2.08 K/UL (ref 1–4.8)
LYMPHOCYTES NFR BLD AUTO: 33.4 % (ref 27–41)
MAGNESIUM SERPL-MCNC: 1.7 MG/DL (ref 1.7–2.3)
MCH RBC QN AUTO: 33 PG (ref 27–31)
MCHC RBC AUTO-ENTMCNC: 34.9 G/DL (ref 32–36)
MCV RBC AUTO: 94.5 FL (ref 80–96)
MONOCYTES # BLD AUTO: 0.74 K/UL (ref 0–0.8)
MONOCYTES NFR BLD AUTO: 11.9 % (ref 2–6)
MPC BLD CALC-MCNC: 10.3 FL (ref 9.4–12.4)
MV PEAK E VEL: 0.64 M/S
NEUTROPHILS # BLD AUTO: 3.16 K/UL (ref 1.8–7.7)
NEUTROPHILS NFR BLD AUTO: 50.8 % (ref 53–65)
NONHDLC SERPL-MCNC: 170 MG/DL
NRBC # BLD AUTO: 0 X10E3/UL
NRBC, AUTO (.00): 0 %
PISA TR MAX VEL: 3.3 M/S
PLATELET # BLD AUTO: 211 K/UL (ref 150–400)
POTASSIUM SERPL-SCNC: 3.3 MMOL/L (ref 3.5–5.1)
RA MAJOR: 3.7 CM
RA PRESSURE: 3 MMHG
RBC # BLD AUTO: 3.61 M/UL (ref 4.2–5.4)
RIGHT VENTRICULAR END-DIASTOLIC DIMENSION: 3.4 CM
SODIUM SERPL-SCNC: 142 MMOL/L (ref 136–145)
TR MAX PG: 44 MMHG
TRICUSPID ANNULAR PLANE SYSTOLIC EXCURSION: 1.7 CM
TRIGL SERPL-MCNC: 428 MG/DL (ref 35–150)
TROPONIN I SERPL HS-MCNC: 6001 PG/ML
TROPONIN I SERPL HS-MCNC: 9247.7 PG/ML
TV REST PULMONARY ARTERY PRESSURE: 47 MMHG
VLDLC SERPL-MCNC: ABNORMAL MG/DL
WBC # BLD AUTO: 6.22 K/UL (ref 4.5–11)

## 2022-11-18 PROCEDURE — 36415 COLL VENOUS BLD VENIPUNCTURE: CPT | Performed by: NURSE PRACTITIONER

## 2022-11-18 PROCEDURE — 85730 THROMBOPLASTIN TIME PARTIAL: CPT | Performed by: HOSPITALIST

## 2022-11-18 PROCEDURE — 85025 COMPLETE CBC W/AUTO DIFF WBC: CPT | Performed by: HOSPITALIST

## 2022-11-18 PROCEDURE — 99223 1ST HOSP IP/OBS HIGH 75: CPT | Mod: 25,ICN,, | Performed by: INTERNAL MEDICINE

## 2022-11-18 PROCEDURE — 99232 PR SUBSEQUENT HOSPITAL CARE,LEVL II: ICD-10-PCS | Mod: ,,, | Performed by: HOSPITALIST

## 2022-11-18 PROCEDURE — 93458 L HRT ARTERY/VENTRICLE ANGIO: CPT | Performed by: INTERNAL MEDICINE

## 2022-11-18 PROCEDURE — 27201423 OPTIME MED/SURG SUP & DEVICES STERILE SUPPLY: Performed by: INTERNAL MEDICINE

## 2022-11-18 PROCEDURE — 63600175 PHARM REV CODE 636 W HCPCS: Performed by: HOSPITALIST

## 2022-11-18 PROCEDURE — 99223 PR INITIAL HOSPITAL CARE,LEVL III: ICD-10-PCS | Mod: 25,ICN,, | Performed by: INTERNAL MEDICINE

## 2022-11-18 PROCEDURE — 25000003 PHARM REV CODE 250: Performed by: NURSE PRACTITIONER

## 2022-11-18 PROCEDURE — 99232 SBSQ HOSP IP/OBS MODERATE 35: CPT | Mod: ,,, | Performed by: HOSPITALIST

## 2022-11-18 PROCEDURE — 99152 MOD SED SAME PHYS/QHP 5/>YRS: CPT | Performed by: INTERNAL MEDICINE

## 2022-11-18 PROCEDURE — 84484 ASSAY OF TROPONIN QUANT: CPT | Performed by: HOSPITALIST

## 2022-11-18 PROCEDURE — 97165 OT EVAL LOW COMPLEX 30 MIN: CPT

## 2022-11-18 PROCEDURE — 94761 N-INVAS EAR/PLS OXIMETRY MLT: CPT

## 2022-11-18 PROCEDURE — 82962 GLUCOSE BLOOD TEST: CPT

## 2022-11-18 PROCEDURE — 84484 ASSAY OF TROPONIN QUANT: CPT | Performed by: NURSE PRACTITIONER

## 2022-11-18 PROCEDURE — 97161 PT EVAL LOW COMPLEX 20 MIN: CPT

## 2022-11-18 PROCEDURE — 93458 PR CATH PLACE/CORON ANGIO, IMG SUPER/INTERP,W LEFT HEART VENTRICULOGRAPHY: ICD-10-PCS | Mod: 26,,, | Performed by: INTERNAL MEDICINE

## 2022-11-18 PROCEDURE — 80048 BASIC METABOLIC PNL TOTAL CA: CPT | Performed by: HOSPITALIST

## 2022-11-18 PROCEDURE — C1894 INTRO/SHEATH, NON-LASER: HCPCS | Performed by: INTERNAL MEDICINE

## 2022-11-18 PROCEDURE — 25000003 PHARM REV CODE 250: Performed by: INTERNAL MEDICINE

## 2022-11-18 PROCEDURE — 93458 L HRT ARTERY/VENTRICLE ANGIO: CPT | Mod: 26,,, | Performed by: INTERNAL MEDICINE

## 2022-11-18 PROCEDURE — 25000003 PHARM REV CODE 250: Performed by: HOSPITALIST

## 2022-11-18 PROCEDURE — C1760 CLOSURE DEV, VASC: HCPCS | Performed by: INTERNAL MEDICINE

## 2022-11-18 PROCEDURE — 83735 ASSAY OF MAGNESIUM: CPT | Performed by: HOSPITALIST

## 2022-11-18 PROCEDURE — 25000242 PHARM REV CODE 250 ALT 637 W/ HCPCS: Performed by: HOSPITALIST

## 2022-11-18 PROCEDURE — 11000001 HC ACUTE MED/SURG PRIVATE ROOM

## 2022-11-18 PROCEDURE — 36415 COLL VENOUS BLD VENIPUNCTURE: CPT | Performed by: HOSPITALIST

## 2022-11-18 PROCEDURE — 93010 EKG 12-LEAD: ICD-10-PCS | Mod: ,,, | Performed by: HOSPITALIST

## 2022-11-18 PROCEDURE — 93010 ELECTROCARDIOGRAM REPORT: CPT | Mod: ,,, | Performed by: HOSPITALIST

## 2022-11-18 PROCEDURE — 80061 LIPID PANEL: CPT | Performed by: HOSPITALIST

## 2022-11-18 PROCEDURE — 25500020 PHARM REV CODE 255: Performed by: INTERNAL MEDICINE

## 2022-11-18 PROCEDURE — 93005 ELECTROCARDIOGRAM TRACING: CPT

## 2022-11-18 PROCEDURE — 85379 FIBRIN DEGRADATION QUANT: CPT | Performed by: HOSPITALIST

## 2022-11-18 PROCEDURE — 63600175 PHARM REV CODE 636 W HCPCS: Performed by: INTERNAL MEDICINE

## 2022-11-18 RX ORDER — ONDANSETRON 4 MG/1
8 TABLET, ORALLY DISINTEGRATING ORAL EVERY 8 HOURS PRN
Status: DISCONTINUED | OUTPATIENT
Start: 2022-11-18 | End: 2022-11-21

## 2022-11-18 RX ORDER — AMLODIPINE BESYLATE 5 MG/1
5 TABLET ORAL DAILY
Status: DISCONTINUED | OUTPATIENT
Start: 2022-11-18 | End: 2022-11-18

## 2022-11-18 RX ORDER — NITROGLYCERIN 0.4 MG/1
0.4 TABLET SUBLINGUAL EVERY 5 MIN PRN
Status: DISCONTINUED | OUTPATIENT
Start: 2022-11-18 | End: 2022-11-22 | Stop reason: HOSPADM

## 2022-11-18 RX ORDER — HEPARIN SOD,PORCINE/0.9 % NACL 1000/500ML
INTRAVENOUS SOLUTION INTRAVENOUS
Status: DISCONTINUED | OUTPATIENT
Start: 2022-11-18 | End: 2022-11-18 | Stop reason: HOSPADM

## 2022-11-18 RX ORDER — SODIUM CHLORIDE 450 MG/100ML
125 INJECTION, SOLUTION INTRAVENOUS CONTINUOUS
Status: DISCONTINUED | OUTPATIENT
Start: 2022-11-18 | End: 2022-11-18

## 2022-11-18 RX ORDER — ACETAMINOPHEN 325 MG/1
650 TABLET ORAL EVERY 4 HOURS PRN
Status: DISCONTINUED | OUTPATIENT
Start: 2022-11-18 | End: 2022-11-21

## 2022-11-18 RX ORDER — DILTIAZEM HYDROCHLORIDE 30 MG/1
30 TABLET, FILM COATED ORAL EVERY 8 HOURS
Status: DISCONTINUED | OUTPATIENT
Start: 2022-11-18 | End: 2022-11-21

## 2022-11-18 RX ORDER — SODIUM CHLORIDE AND POTASSIUM CHLORIDE 150; 900 MG/100ML; MG/100ML
INJECTION, SOLUTION INTRAVENOUS CONTINUOUS
Status: DISCONTINUED | OUTPATIENT
Start: 2022-11-18 | End: 2022-11-18

## 2022-11-18 RX ORDER — FENTANYL CITRATE 50 UG/ML
INJECTION, SOLUTION INTRAMUSCULAR; INTRAVENOUS
Status: DISCONTINUED | OUTPATIENT
Start: 2022-11-18 | End: 2022-11-18 | Stop reason: HOSPADM

## 2022-11-18 RX ORDER — MIDAZOLAM HYDROCHLORIDE 1 MG/ML
INJECTION INTRAMUSCULAR; INTRAVENOUS
Status: DISCONTINUED | OUTPATIENT
Start: 2022-11-18 | End: 2022-11-18 | Stop reason: HOSPADM

## 2022-11-18 RX ORDER — LIDOCAINE HYDROCHLORIDE 10 MG/ML
INJECTION, SOLUTION EPIDURAL; INFILTRATION; INTRACAUDAL; PERINEURAL
Status: DISCONTINUED | OUTPATIENT
Start: 2022-11-18 | End: 2022-11-18 | Stop reason: HOSPADM

## 2022-11-18 RX ADMIN — LISINOPRIL 20 MG: 20 TABLET ORAL at 09:11

## 2022-11-18 RX ADMIN — INSULIN ASPART 2 UNITS: 100 INJECTION, SOLUTION INTRAVENOUS; SUBCUTANEOUS at 09:11

## 2022-11-18 RX ADMIN — POTASSIUM CHLORIDE AND SODIUM CHLORIDE: 900; 150 INJECTION, SOLUTION INTRAVENOUS at 12:11

## 2022-11-18 RX ADMIN — AMLODIPINE BESYLATE 5 MG: 5 TABLET ORAL at 11:11

## 2022-11-18 RX ADMIN — INSULIN ASPART 4 UNITS: 100 INJECTION, SOLUTION INTRAVENOUS; SUBCUTANEOUS at 05:11

## 2022-11-18 RX ADMIN — METOPROLOL TARTRATE 100 MG: 50 TABLET, FILM COATED ORAL at 08:11

## 2022-11-18 RX ADMIN — LISINOPRIL 20 MG: 20 TABLET ORAL at 08:11

## 2022-11-18 RX ADMIN — INSULIN ASPART 4 UNITS: 100 INJECTION, SOLUTION INTRAVENOUS; SUBCUTANEOUS at 06:11

## 2022-11-18 RX ADMIN — HYDROCHLOROTHIAZIDE 12.5 MG: 12.5 TABLET ORAL at 08:11

## 2022-11-18 RX ADMIN — HYDROCHLOROTHIAZIDE 12.5 MG: 12.5 TABLET ORAL at 09:11

## 2022-11-18 RX ADMIN — PANTOPRAZOLE SODIUM 40 MG: 40 TABLET, DELAYED RELEASE ORAL at 09:11

## 2022-11-18 RX ADMIN — DILTIAZEM HYDROCHLORIDE 30 MG: 30 TABLET, FILM COATED ORAL at 09:11

## 2022-11-18 RX ADMIN — HEPARIN SODIUM 15 UNITS/KG/HR: 10000 INJECTION, SOLUTION INTRAVENOUS at 05:11

## 2022-11-18 RX ADMIN — NITROGLYCERIN 0.4 MG: 0.4 TABLET SUBLINGUAL at 07:11

## 2022-11-18 RX ADMIN — PAROXETINE HYDROCHLORIDE 10 MG: 10 TABLET, FILM COATED ORAL at 06:11

## 2022-11-18 NOTE — MEDICAL/APP STUDENT
[facility] - Cath Lab  Consult Note    Patient Name: Erlinda Cuevas  MRN: [id number]  Admission Date: 11/17/2022  Hospital Length of Stay: 1 days  Attending Physician: Lonnie Deleon MD   Primary Care Provider: Rigoberto Espinosa III, DO     Patient information was obtained from patient, past medical records, and ER records.     Inpatient consult to Cardiology  Consult performed by: Mingo Denson DO  Consult ordered by: Lonnie Deleon MD  Reason for consult: Afib with RVR and elevated troponins      Subjective:     Principal Problem: Atrial fibrillation with RVR    Chief Complaint: Chest Pain    HPI: A 76 yo female with a history of takotsuba cardiomyopathy, HTN, HLD, T2DM, CVA [2021], and AGATA without CPAP presented to the ED with chest pain. Yesterday morning, she woke with pain in the sternal area. The pain was achy, 4/10 and non-radiating. She had associated SOB,  nausea, vomiting x1 and diaphoresis. She states she has been experiencing similar chest pain on and off over the last several weeks.     In the ED, EKG showed atrial fibrillation with RVR in the 120s. No history of a-fib. She was administered diltiazem IV 20mg. She is rate controlled but remains in a-fib. Troponin is elevated, initially 122, then 383, and most recently 9247.     She is followed by Dr. Nuñez for nonischemic cardiomyopathy.       Past Medical History:   Diagnosis Date    Benign paroxysmal vertigo     Cardiomyopathy     Diabetes     Hypertension     Pulmonary HTN     Stroke     affected balance     Takotsubo cardiomyopathy     Valvular regurgitation        Past Surgical History:   Procedure Laterality Date    CATARACT EXTRACTION W/ INTRAOCULAR LENS  IMPLANT, BILATERAL      EYE SURGERY      HYSTERECTOMY         Review of patient's allergies indicates:   Allergen Reactions    Pravastatin Rash       No current facility-administered medications on file prior to encounter.     Current Outpatient Medications on File Prior to  Encounter   Medication Sig    clindamycin (CLEOCIN T) 1 % lotion APPLY TOPICALLY TWICE DAILY TO THE AFFECTED AREA(S) OF BOTH ARMPITS    clopidogreL (PLAVIX) 75 mg tablet Take 1 tablet (75 mg total) by mouth once daily.    ezetimibe (ZETIA) 10 mg tablet Take 1 tablet (10 mg total) by mouth once daily.    hydrALAZINE (APRESOLINE) 25 MG tablet Take 1 tablet (25 mg total) by mouth 2 (two) times daily.    lisinopriL-hydrochlorothiazide (PRINZIDE,ZESTORETIC) 20-12.5 mg per tablet Take 1 tablet by mouth 2 (two) times a day.    metFORMIN (GLUCOPHAGE) 500 MG tablet Take 2 tablets (1,000 mg total) by mouth 2 (two) times daily with meals. Take 2 tabs twice daily    metoprolol tartrate (LOPRESSOR) 100 MG tablet Take 1 tablet (100 mg total) by mouth 2 (two) times daily.    paroxetine (PAXIL) 10 MG tablet Take 1 tablet (10 mg total) by mouth every morning.    pioglitazone (ACTOS) 45 MG tablet Take 1 tablet (45 mg total) by mouth once daily.    vitamin E 400 UNIT capsule Take 400 Units by mouth once daily.    [DISCONTINUED] pravastatin (PRAVACHOL) 20 MG tablet Take 1 tablet (20 mg total) by mouth once daily.     Family History       Problem Relation (Age of Onset)    Cancer Mother, Brother    Diabetes Sister    Heart disease Father, Sister          Tobacco Use    Smoking status: Former     Types: Cigarettes     Quit date:      Years since quittin.9    Smokeless tobacco: Never   Substance and Sexual Activity    Alcohol use: Never    Drug use: Never    Sexual activity: Not Currently     Review of Systems   Constitutional:  Negative for activity change, chills and fever.   HENT:  Negative for congestion and sore throat.    Respiratory:  Negative for cough, chest tightness and shortness of breath.    Cardiovascular:  Positive for chest pain. Negative for palpitations and leg swelling.   Gastrointestinal:  Negative for abdominal pain, nausea and vomiting.   Endocrine: Negative for cold intolerance and heat intolerance.    Musculoskeletal:  Negative for arthralgias and myalgias.   Skin:  Negative for color change and pallor.   Neurological:  Positive for weakness. Negative for dizziness and headaches.   Psychiatric/Behavioral:  Negative for agitation. The patient is not nervous/anxious.      Objective:     Vital Signs (Most Recent):  Temp: 98.6 °F (37 °C) (11/18/22 0700)  Pulse: (!) 52 (11/18/22 0700)  Resp: 17 (11/18/22 0700)  BP: (!) 188/67 (11/18/22 0700)  SpO2: 97 % (11/18/22 0700)   Vital Signs (24h Range):  Temp:  [97.7 °F (36.5 °C)-98.6 °F (37 °C)] 98.6 °F (37 °C)  Pulse:  [] 52  Resp:  [10-19] 17  SpO2:  [92 %-97 %] 97 %  BP: (118-188)/(58-95) 188/67     Weight: 59.4 kg (131 lb)  Body mass index is 26.46 kg/m².    Intake/Output Summary (Last 24 hours) at 11/18/2022 0944  Last data filed at 11/17/2022 1521  Gross per 24 hour   Intake 1000 ml   Output --   Net 1000 ml      Physical Exam  Constitutional:       General: She is not in acute distress.     Appearance: Normal appearance.   HENT:      Head: Normocephalic and atraumatic.      Right Ear: External ear normal.      Left Ear: External ear normal.      Nose: Nose normal.      Mouth/Throat:      Pharynx: Oropharynx is clear.   Eyes:      Extraocular Movements: Extraocular movements intact.      Pupils: Pupils are equal, round, and reactive to light.   Cardiovascular:      Rate and Rhythm: Normal rate. Rhythm irregular.      Pulses: Normal pulses.      Heart sounds: Normal heart sounds.   Pulmonary:      Effort: Pulmonary effort is normal.      Breath sounds: Normal breath sounds.   Abdominal:      General: Abdomen is flat.      Palpations: Abdomen is soft.      Tenderness: There is no abdominal tenderness.   Musculoskeletal:         General: Normal range of motion.      Cervical back: Normal range of motion.      Right lower leg: No edema.      Left lower leg: No edema.   Skin:     General: Skin is warm and dry.      Capillary Refill: Capillary refill takes less than  2 seconds.   Neurological:      Mental Status: She is alert and oriented to person, place, and time.      Comments: Left sided weakness from previous CVA   Psychiatric:         Mood and Affect: Mood normal.         Thought Content: Thought content normal.       Significant Labs: All pertinent labs within the past 24 hours have been reviewed.    Significant Imaging: I have reviewed all pertinent imaging results/findings within the past 24 hours.        Assessment/Plan:     Elevated troponin  Initially elevated at 122, now 9247.  Anticoagulated with heparin overnight  Plan [facility] today  Echo pending    Atrial fibrillation with RVR  New onset afib with RVR in 120s  Rate controlled in 50s following diltiazem  Will reevaluate following [facility] today     Frequent falls  Physical therapy to see     Bilateral carotid artery stenosis  Outpatient follow-up     Takotsubo cardiomyopathy  6/22 echo EF 50% with left ventricular hypokinesis  Recent echo pending     Hemiparesis affecting left side as late effect of cerebrovascular accident (CVA)  Therapy team to see especially with history of recent falls     Systolic hypertension  Adjust medications as needed     Type 2 diabetes mellitus, without long-term current use of insulin  Patient's FSGs are controlled on current medication regimen.  Last A1c reviewed-         Lab Results   Component Value Date     HGBA1C 7.7 (H) 06/22/2022      Most recent fingerstick glucose reviewed- No results for input(s): POCTGLUCOSE in the last 24 hours.  Current correctional scale  Medium  Maintain anti-hyperglycemic dose as follows-             Antihyperglycemics (From admission, onward)     Start     Stop Route Frequency Ordered     11/18/22 0900   pioglitazone tablet 45 mg         -- Oral Daily 11/17/22 1959 11/17/22 2054   insulin aspart U-100 injection 1-10 Units         -- SubQ Before meals & nightly PRN 11/17/22 1959          Hold Oral hypoglycemics while patient is in the hospital.      Active  Diagnoses:    Diagnosis Date Noted POA    PRINCIPAL PROBLEM:  Atrial fibrillation with RVR [I48.91] 11/17/2022 Yes    Elevated troponin [R77.8] 11/18/2022 Yes    Frequent falls [R29.6] 11/17/2022 Not Applicable    Bilateral carotid artery stenosis [I65.23] 11/15/2021 Yes    Takotsubo cardiomyopathy [I51.81]  Yes    Hemiparesis affecting left side as late effect of cerebrovascular accident (CVA) [I69.354] 09/07/2021 Not Applicable    Type 2 diabetes mellitus, without long-term current use of insulin [E11.9] 03/16/2021 Yes    Systolic hypertension [I10] 03/16/2021 Yes      Problems Resolved During this Admission:     VTE Risk Mitigation (From admission, onward)           Ordered     heparin 25,000 units in dextrose 5% 250 mL (100 units/mL) infusion LOW INTENSITY nomogram - [facility]  Continuous        Question:  Begin at (in units/kg/hr)  Answer:  12    11/17/22 2002     heparin 25,000 units in dextrose 5% (100 units/ml) IV bolus from bag - ADDITIONAL PRN BOLUS - 60 units/kg  As needed (PRN)        Question:  Heparin Infusion Adjustment (DO NOT MODIFY ANSWER)  Answer:  [email or web address]\[email or web address]\Images\Pharmacy\HeparinInfusions\heparin LOW INTENSITY nomogram for [place] [id number]    11/17/22 2002     heparin 25,000 units in dextrose 5% (100 units/ml) IV bolus from bag - ADDITIONAL PRN BOLUS - 30 units/kg  As needed (PRN)        Question:  Heparin Infusion Adjustment (DO NOT MODIFY ANSWER)  Answer:  \[email or web address]\epic\Images\Pharmacy\HeparinInfusions\heparin LOW INTENSITY nomogram for [place] [id number]    11/17/22 2002     Reason for No Pharmacological VTE Prophylaxis  Once        Question:  Reasons:  Answer:  Already adequately anticoagulated on oral Anticoagulants    11/17/22 1959     IP VTE HIGH RISK PATIENT  Once         11/17/22 1959     Place sequential compression device  Until discontinued         11/17/22 1959                    Thank you for your consult. I will follow-up with patient. Please contact us if you have any  additional questions.    Mad[facility] - Cath Lab    PT seen and examined, chart reviewed, essentially agree with findings as documented  CC: Chest pain  HPI: Pt describes two month history of recurrent chest pain, 8/10, provoked by exercise and relieved with rest, most severe and prolonged event occurred day of admission. She is now pain free.   PE: agree with findings as documented  Labs, Xrays, EKGs reviewed  IMP/Plan:  NSTEMI: discussed options, recommend LHC/poss, risks and benefits discussed, pt elects to proceed with diagnostic LHC,  informed consent obtained, will add to cath schedule today.

## 2022-11-18 NOTE — ASSESSMENT & PLAN NOTE
Patient with new onset atrial fibrillation which is controlled currently with Beta Blocker. Patient is currently in atrial fibrillation.[id number] Score: 7. HASBLED Score: 3 ( moderate risk of bleeding in range of 3.7-6% per year). Anticoagulation indicated. Anticoagulation done with heparin.

## 2022-11-18 NOTE — NURSING TRANSFER
Nursing Transfer Note      11/17/2022     Reason patient is being transferred: Hospital admit    Transfer To: 549    Transfer via bed    Transfer with cardiac monitoring    Transported by ED     Medicines sent:     Any special needs or follow-up needed:     Chart send with patient: No    Notified: spouse    Patient reassessed at:  (date, time)    Upon arrival to floor: cardiac monitor applied, patient oriented to room, call bell in reach, and bed in lowest position

## 2022-11-18 NOTE — HPI
"Chief complaint:  Chest pain    History of present illness:    Ms. Cuevas is 77-year-old presented to the emergency room with on and off chest pain over the last 2 weeks.  Has occurred with rest but also with exertion.  Symptoms associated with shortness of breaths.  Worse episode earlier day of admission associated with some diaphoresis and nausea vomiting x1.  When seen in the emergency room was noted to have atrial fibrillation with rate 120.  No prior history of atrial fibrillation.  Since heart rates been more controlled in the 60s.  Was noted have elevated troponin.  Patient is followed by Dr. Nuñez for nonischemic cardiomyopathy.  States told in the past she had "broken heart with normal coronary arteries.     Review of systems:  See above.  Patient gets occasional jerking movement that causes her to fall.  She is been evaluated in the past in Neurology Clinic by Diego Benitez NP.  States has a Rollator but does not use it often.  Four weeks ago had syncopal like episode where she fell and thinks she might have been unconscious just for a few seconds.  No tongue biting or incontinence.  No chest pain or palpitation.  States Dr. Espinosa adjust raised her blood pressure medicine up and she thinks he gave her too much.   Review systems otherwise negative.    Past medical history:  -essential hypertension times 20 years.  -diabetes mellitus type 2 diagnosed in  controlled with oral agents.  -cerebrovascular accident x2.  States with 1st stroke lost partial peripheral vision to left eye.  Second stroke caused weakness to right side left with weakness to left upper extremity and some paresthesias there.    Past surgical history:  Removed floater from right eye  Bilateral cataract surgery   section    Social history:  Lives alone  States   2 years earlier  Son lives in [place]  States has neighbors that she can call if needs help  No history of tobacco alcohol use    Family history:  No " one in family premature coronary artery disease.    Health maintenance issues:  Has seen Dr. ROCHA in past but plans on getting a new doctor and was prior recommended to see Dr. Hurd.  Does not take flu shots Pneumovax or any prior COVID vaccination  Does not do Pap smears, mammograms or colonoscopies and understands these are important cancer screening studies but not does not plan to have done.

## 2022-11-18 NOTE — NURSING
Notified MD and Cardiology of Troponin 6001.0. Also of pt refusing some of her morning meds and only took Protonix, Hctz, and Lisinopril.

## 2022-11-18 NOTE — HOSPITAL COURSE
11/18 - no additional chest pain.  No shortness of breath.  Main complaint is wanting something to eat.  Markedly elevated troponin.  Cardiology to see.  Await studies.    11/21:  Plan for left heart catheterization today.    11/22: Patient will be discharged home today. She was admitted after she presented to the ED with on and off chest pain over the previous 2 weeks. When she was seen in the ED she was noted to have A-Fib with rate of 120 and no previous history of this. She was also noted to have Troponin elevation from 122 to 383. Her ECHO showed EF of 35%. Cardiology was consulted for NSTEMI and patient underwent LHC on 11/18 and was noted to have severe triple vessel CAD and Dr. Denson noted that he reviewed films with CTS in which LAD and RCA not surgical targets for possible CABG. LHC on 11/21 and patient is now s/p successful PCI to mid LAD. Discharge medications followed per cardiology recommendations; however, patient stated she does not have insurance coverage for prescriptions and did not plan to take Eliquis, Imdur, or Spironolactone. Discussed with patient at length importance of these medications and could have [facility] to bring them up. Cardiac Rehab was also initiated by  and patient also refused that. She is unsure if she will move to [place] with her son in the near future to live with him while she recovers.

## 2022-11-18 NOTE — H&P
"[facility] Medicine  History & Physical    Patient Name: Erilnda Cuevas  MRN: [id number]  Patient Class: IP- Inpatient  Admission Date: 11/17/2022  Attending Physician: Lonnie Deleon MD   Primary Care Provider: Rigoberto Espinosa III, DO         Patient information was obtained from patient, past medical records and ER records.     Subjective:     Principal Problem:Atrial fibrillation with RVR    Chief Complaint:   Chief Complaint   Patient presents with    Irregular Heart Beat    Chest Pain        HPI: Chief complaint:  Chest pain    History of present illness:    Ms. Cuevas is 77-year-old presented to the emergency room with on and off chest pain over the last 2 weeks.  Has occurred with rest but also with exertion.  Symptoms associated with shortness of breaths.  Worse episode earlier day of admission associated with some diaphoresis and nausea vomiting x1.  When seen in the emergency room was noted to have atrial fibrillation with rate 120.  No prior history of atrial fibrillation.  Since heart rates been more controlled in the 60s.  Was noted have elevated troponin.  Patient is followed by Dr. Nuñez for nonischemic cardiomyopathy.  States told in the past she had "broken heart with normal coronary arteries.     Review of systems:  See above.  Patient gets occasional jerking movement that causes her to fall.  She is been evaluated in the past in Neurology [facility] by Diego Benitez NP.  States has a Rollator but does not use it often.  Four weeks ago had syncopal like episode where she fell and thinks she might have been unconscious just for a few seconds.  No tongue biting or incontinence.  No chest pain or palpitation.  States Dr. Espinosa adjust raised her blood pressure medicine up and she thinks he gave her too much.   Review systems otherwise negative.    Past medical history:  -essential hypertension times 20 years.  -diabetes mellitus type 2 " diagnosed in  controlled with oral agents.  -cerebrovascular accident x2.  States with 1st stroke lost partial peripheral vision to left eye.  Second stroke caused weakness to right side left with weakness to left upper extremity and some paresthesias there.    Past surgical history:  Removed floater from right eye  Bilateral cataract surgery   section    Social history:  Lives alone  States   2 years earlier  Sudden lives in [place]  States has neighbors that she can call if needs help  No history of tobacco alcohol use    Family history:  No one in family premature coronary artery disease.    Health maintenance issues:  Has seen Dr. ROCHA in past but plans on getting a new doctor and was prior recommended to see Dr. Hurd.  Does not take flu shots Pneumovax or any prior COVID vaccination  Does not do Pap smears, mammograms or colonoscopies and understands these are important cancer screening studies but not does not plan to have done.             Past Medical History:   Diagnosis Date    Benign paroxysmal vertigo     Cardiomyopathy     Diabetes     Hypertension     Pulmonary HTN     Stroke     affected balance     Takotsubo cardiomyopathy     Valvular regurgitation        Past Surgical History:   Procedure Laterality Date    CATARACT EXTRACTION W/ INTRAOCULAR LENS  IMPLANT, BILATERAL      EYE SURGERY      HYSTERECTOMY         Review of patient's allergies indicates:   Allergen Reactions    Pravastatin Rash       No current facility-administered medications on file prior to encounter.     Current Outpatient Medications on File Prior to Encounter   Medication Sig    clindamycin (CLEOCIN T) 1 % lotion APPLY TOPICALLY TWICE DAILY TO THE AFFECTED AREA(S) OF BOTH ARMPITS    clopidogreL (PLAVIX) 75 mg tablet Take 1 tablet (75 mg total) by mouth once daily.    ezetimibe (ZETIA) 10 mg tablet Take 1 tablet (10 mg total) by mouth once daily.    hydrALAZINE (APRESOLINE) 25 MG tablet Take  1 tablet (25 mg total) by mouth 2 (two) times daily.    lisinopriL-hydrochlorothiazide (PRINZIDE,ZESTORETIC) 20-12.5 mg per tablet Take 1 tablet by mouth 2 (two) times a day.    metFORMIN (GLUCOPHAGE) 500 MG tablet Take 2 tablets (1,000 mg total) by mouth 2 (two) times daily with meals. Take 2 tabs twice daily    metoprolol tartrate (LOPRESSOR) 100 MG tablet Take 1 tablet (100 mg total) by mouth 2 (two) times daily.    paroxetine (PAXIL) 10 MG tablet Take 1 tablet (10 mg total) by mouth every morning.    pioglitazone (ACTOS) 45 MG tablet Take 1 tablet (45 mg total) by mouth once daily.    vitamin E 400 UNIT capsule Take 400 Units by mouth once daily.    [DISCONTINUED] pravastatin (PRAVACHOL) 20 MG tablet Take 1 tablet (20 mg total) by mouth once daily.     Family History       Problem Relation (Age of Onset)    Cancer Mother, Brother    Diabetes Sister    Heart disease Father, Sister          Tobacco Use    Smoking status: Former     Types: Cigarettes     Quit date:      Years since quittin.9    Smokeless tobacco: Never   Substance and Sexual Activity    Alcohol use: Never    Drug use: Never    Sexual activity: Not Currently     Review of Systems   Constitutional:  Negative for appetite change, fatigue and fever.   HENT:  Negative for congestion, hearing loss and trouble swallowing.    Respiratory:  Positive for shortness of breath. Negative for chest tightness and wheezing.    Cardiovascular:  Positive for chest pain. Negative for palpitations.   Gastrointestinal:  Positive for nausea and vomiting. Negative for abdominal pain and constipation.   Genitourinary:  Negative for difficulty urinating and dysuria.   Musculoskeletal:  Positive for gait problem. Negative for back pain and neck stiffness.   Skin:  Negative for pallor and rash.   Neurological:  Positive for weakness. Negative for dizziness, speech difficulty and headaches.   Psychiatric/Behavioral:  Negative for confusion and suicidal  ideas.    Objective:     Vital Signs (Most Recent):  Temp: 98.6 °F (37 °C) (11/18/22 0700)  Pulse: (!) 52 (11/18/22 0700)  Resp: 17 (11/18/22 0700)  BP: (!) 188/67 (11/18/22 0700)  SpO2: 97 % (11/18/22 0700)   Vital Signs (24h Range):  Temp:  [97.7 °F (36.5 °C)-98.6 °F (37 °C)] 98.6 °F (37 °C)  Pulse:  [] 52  Resp:  [10-19] 17  SpO2:  [92 %-97 %] 97 %  BP: (118-188)/(58-95) 188/67     Weight: 59.4 kg (131 lb)  Body mass index is 26.46 kg/m².    Physical Exam  Vitals reviewed.   Constitutional:       General: She is awake. She is not in acute distress.     Appearance: She is well-developed. She is not toxic-appearing.   HENT:      Head: Normocephalic.      Nose: Nose normal.      Mouth/Throat:      Pharynx: Oropharynx is clear.   Eyes:      Extraocular Movements: Extraocular movements intact.      Pupils: Pupils are equal, round, and reactive to light.   Neck:      Thyroid: No thyroid mass.      Vascular: No carotid bruit.   Cardiovascular:      Rate and Rhythm: Normal rate and regular rhythm.      Pulses: Normal pulses.      Heart sounds: Normal heart sounds. No murmur heard.  Pulmonary:      Effort: Pulmonary effort is normal.      Breath sounds: Normal breath sounds and air entry. No wheezing.   Abdominal:      General: Bowel sounds are normal. There is no distension.      Palpations: Abdomen is soft.      Tenderness: There is no abdominal tenderness.   Musculoskeletal:         General: Normal range of motion.      Cervical back: Neck supple. No rigidity.   Skin:     General: Skin is warm.      Coloration: Skin is not jaundiced.      Findings: No lesion.   Neurological:      Mental Status: She is alert and oriented to person, place, and time.      Cranial Nerves: No cranial nerve deficit.      Comments:   RUE   4 / 5  LUE    5 /5  RLE    4 / 5  LLE    5 / 5   Psychiatric:         Attention and Perception: Attention normal.         Mood and Affect: Mood normal.         Behavior: Behavior normal. Behavior is  cooperative.         Thought Content: Thought content normal.         Cognition and Memory: Cognition normal.         CRANIAL NERVES     CN III, IV, VI   Pupils are equal, round, and reactive to light.     Significant Labs: All pertinent labs within the past 24 hours have been reviewed.  BMP:     Significant Imaging: I have reviewed all pertinent imaging results/findings within the past 24 hours.    Assessment/Plan:     * Atrial fibrillation with RVR  Patient with new onset atrial fibrillation which is controlled currently with Beta Blocker. Patient is currently in atrial fibrillation.[id number] Score: 7. HASBLED Score: 3 ( moderate risk of bleeding in range of 3.7-6% per year). Anticoagulation indicated. Anticoagulation done with heparin.        Elevated troponin  Cardiology to see  Obtain prior left heart catheterization results    Frequent falls  Physical therapy to see      Bilateral carotid artery stenosis  Outpatient follow-up      Takotsubo cardiomyopathy  Obtain old left heart catheterization results      Hemiparesis affecting left side as late effect of cerebrovascular accident (CVA)  Therapy team to see especially with history of recent falls      Systolic hypertension    Adjust medications as needed    Type 2 diabetes mellitus, without long-term current use of insulin  Patient's FSGs are controlled on current medication regimen.  Last A1c reviewed-   Lab Results   Component Value Date    HGBA1C 7.7 (H) 06/22/2022     Most recent fingerstick glucose reviewed- No results for input(s): POCTGLUCOSE in the last 24 hours.  Current correctional scale  Medium  Maintain anti-hyperglycemic dose as follows-   Antihyperglycemics (From admission, onward)    Start     Stop Route Frequency Ordered    11/18/22 0900  pioglitazone tablet 45 mg         -- Oral Daily 11/17/22 1959 11/17/22 2054  insulin aspart U-100 injection 1-10 Units         -- SubQ Before meals & nightly PRN 11/17/22 1959        Hold Oral  hypoglycemics while patient is in the hospital.      VTE Risk Mitigation (From admission, onward)         Ordered     heparin 25,000 units in dextrose 5% 250 mL (100 units/mL) infusion LOW INTENSITY nomogram - [facility]  Continuous        Question:  Begin at (in units/kg/hr)  Answer:  12    11/17/22 2002     heparin 25,000 units in dextrose 5% (100 units/ml) IV bolus from bag - ADDITIONAL PRN BOLUS - 60 units/kg  As needed (PRN)        Question:  Heparin Infusion Adjustment (DO NOT MODIFY ANSWER)  Answer:  [email or web address]\epic\Images\Pharmacy\HeparinInfusions\heparin LOW INTENSITY nomogram for [place] [id number]    11/17/22 2002     heparin 25,000 units in dextrose 5% (100 units/ml) IV bolus from bag - ADDITIONAL PRN BOLUS - 30 units/kg  As needed (PRN)        Question:  Heparin Infusion Adjustment (DO NOT MODIFY ANSWER)  Answer:  [email or web address]\ep[facility]\Images\Pharmacy\HeparinInfusions\heparin LOW INTENSITY nomogram for [place] [id number]    11/17/22 2002     Reason for No Pharmacological VTE Prophylaxis  Once        Question:  Reasons:  Answer:  Already adequately anticoagulated on oral Anticoagulants    11/17/22 1959     IP VTE HIGH RISK PATIENT  Once         11/17/22 1959     Place sequential compression device  Until discontinued         11/17/22 1959                   Lonnie Deleon MD  Department of Hospital Medicine   [facility]

## 2022-11-18 NOTE — NURSING
Pt's troponin 9,247.7 Notified Dr. Sutton in person and via secure chat. No new orders at this time. Pt /58 O2 96% HR 49 T 98.3. No complaints of chest pain at this time.

## 2022-11-18 NOTE — MED STUDENT ASSESSMENT & PLAN
Elevated troponin  Initially elevated at 122, now 9247.  Anticoagulated with heparin overnight  Plan [facility] today  Echo pending    Atrial fibrillation with RVR  New onset afib with RVR in 120s  Rate controlled in 50s following diltiazem  Will reevaluate following [facility] today     Frequent falls  Physical therapy to see     Bilateral carotid artery stenosis  Outpatient follow-up     Takotsubo cardiomyopathy  6/22 echo EF 50% with left ventricular hypokinesis  Recent echo pending     Hemiparesis affecting left side as late effect of cerebrovascular accident (CVA)  Therapy team to see especially with history of recent falls     Systolic hypertension  Adjust medications as needed     Type 2 diabetes mellitus, without long-term current use of insulin  Patient's FSGs are controlled on current medication regimen.  Last A1c reviewed-         Lab Results   Component Value Date     HGBA1C 7.7 (H) 06/22/2022      Most recent fingerstick glucose reviewed- No results for input(s): POCTGLUCOSE in the last 24 hours.  Current correctional scale  Medium  Maintain anti-hyperglycemic dose as follows-             Antihyperglycemics (From admission, onward)     Start     Stop Route Frequency Ordered     11/18/22 0900   pioglitazone tablet 45 mg         -- Oral Daily 11/17/22 1959 11/17/22 2054   insulin aspart U-100 injection 1-10 Units         -- SubQ Before meals & nightly PRN 11/17/22 1959          Hold Oral hypoglycemics while patient is in the hospital.

## 2022-11-18 NOTE — PT/OT/SLP EVAL
Occupational Therapy   Evaluation and Discharge Note    Name: Erlinda Cuevas  MRN: [id number]  Admitting Diagnosis:  Atrial fibrillation with RVR   Recent Surgery: * No surgery found *      Recommendations:     Discharge Recommendations: home  Discharge Equipment Recommendations:  cane, straight  Barriers to discharge:  None    Assessment:     Erlinda Cuevas is a 77 y.o. female with a medical diagnosis of Atrial fibrillation with RVR. At this time, patient is functioning at their prior level of function and does not require further acute OT services.     Plan:     During this hospitalization, patient does not require further acute OT services.  Please re-consult if situation changes.    Plan of Care Reviewed with: patient    Subjective     Chief Complaint: Afib with RVR  Patient/Family Comments/goals: pt agreeable to participate in OT eval    Occupational Profile:  Living Environment: pt lives alone in one story home with 3 steps to enter with handrails  Previous level of function: independent with all ADL tasks, IADL tasks, and furniture cruising with functional mobility   Roles and Routines: perform self care;   Equipment Used at home:  rollator  Assistance upon Discharge: home with family assist as needed    Pain/Comfort:  Pain Rating 1: 0/10    Patients cultural, spiritual, Scientology conflicts given the current situation: no    Objective:     Communicated with: ADORE Duong prior to session.  Patient found supine with peripheral IV, telemetry upon OT entry to room.    General Precautions: Standard, fall   Orthopedic Precautions:N/A   Braces: N/A  Respiratory Status: Room air     Occupational Performance:    Bed Mobility:    Patient completed Supine to Sit with modified independence    Functional Mobility/Transfers:  Patient completed Sit <> Stand Transfer with modified independence  with  IV pole   Patient completed Bed <> Chair Transfer using Step Transfer technique with modified  independence with IV pole  Functional Mobility: pt performed functional mobility to door and back to chair with [place] with IV pole    Activities of Daily Living:  Lower Body Dressing: independence to jacob shoes    Cognitive/Visual Perceptual:  Cognitive/Psychosocial Skills:     -       Oriented to: Person, Place, Time, and Situation   -       Follows Commands/attention:Follows multistep  commands  -       Mood/Affect/Coping skills/emotional control: Cooperative    Physical Exam:  Balance:    -       WFL with AD  Upper Extremity Range of Motion:     -       Right Upper Extremity: WFL  -       Left Upper Extremity: WFL  Upper Extremity Strength:    -       Right Upper Extremity: WFL  -       Left Upper Extremity: WFL  Gross motor coordination:   WFL    AMPAC 6 Click ADL:  AMPAC Total Score: 24    Treatment & Education:  Pt educated on safety with performing functional mobility and ADL tasks and utilizing AD.     Patient left up in chair with all lines intact, call button in reach, and ADORE Duong notified    GOALS:   Multidisciplinary Problems       Occupational Therapy Goals       Not on file                    History:     Past Medical History:   Diagnosis Date    Benign paroxysmal vertigo     Cardiomyopathy     Diabetes     Hypertension     Pulmonary HTN     Stroke     affected balance     Takotsubo cardiomyopathy     Valvular regurgitation          Past Surgical History:   Procedure Laterality Date    CATARACT EXTRACTION W/ INTRAOCULAR LENS  IMPLANT, BILATERAL      EYE SURGERY      HYSTERECTOMY         Time Tracking:     OT Date of Treatment: 11/18/22  OT Start Time: 0958  OT Stop Time: 1015  OT Total Time (min): 17 min    Billable Minutes:Evaluation OT min complexity eval    11/18/2022

## 2022-11-18 NOTE — ASSESSMENT & PLAN NOTE
Patient's FSGs are controlled on current medication regimen.  Last A1c reviewed-   Lab Results   Component Value Date    HGBA1C 7.7 (H) 06/22/2022     Most recent fingerstick glucose reviewed- No results for input(s): POCTGLUCOSE in the last 24 hours.  Current correctional scale  Medium  Maintain anti-hyperglycemic dose as follows-   Antihyperglycemics (From admission, onward)    Start     Stop Route Frequency Ordered    11/18/22 0900  pioglitazone tablet 45 mg         -- Oral Daily 11/17/22 1959 11/17/22 2054  insulin aspart U-100 injection 1-10 Units         -- SubQ Before meals & nightly PRN 11/17/22 1959        Hold Oral hypoglycemics while patient is in the hospital.

## 2022-11-18 NOTE — MED STUDENT SUBJECTIVE & OBJECTIVE
Medical Student Subjective & Objective     Principal Problem: Atrial fibrillation with RVR    Chief Complaint: Chest Pain    HPI: A 76 yo female with a history of takotsuba cardiomyopathy, HTN, HLD, T2DM, CVA [2021], and AGATA without CPAP presented to the ED with chest pain. Yesterday morning, she woke with pain in the sternal area. The pain was achy, 4/10 and non-radiating. She had associated SOB,  nausea, vomiting x1 and diaphoresis. She states she has been experiencing similar chest pain on and off over the last several weeks.     In the ED, EKG showed atrial fibrillation with RVR in the 120s. No history of a-fib. She was administered diltiazem IV 20mg. She is rate controlled but remains in a-fib. Troponin is elevated, initially 122, then 383, and most recently 9247.     She is followed by Dr. Nuñez for nonischemic cardiomyopathy.       Past Medical History:   Diagnosis Date    Benign paroxysmal vertigo     Cardiomyopathy     Diabetes     Hypertension     Pulmonary HTN     Stroke     affected balance     Takotsubo cardiomyopathy     Valvular regurgitation        Past Surgical History:   Procedure Laterality Date    CATARACT EXTRACTION W/ INTRAOCULAR LENS  IMPLANT, BILATERAL      EYE SURGERY      HYSTERECTOMY         Review of patient's allergies indicates:   Allergen Reactions    Pravastatin Rash       No current facility-administered medications on file prior to encounter.     Current Outpatient Medications on File Prior to Encounter   Medication Sig    clindamycin (CLEOCIN T) 1 % lotion APPLY TOPICALLY TWICE DAILY TO THE AFFECTED AREA(S) OF BOTH ARMPITS    clopidogreL (PLAVIX) 75 mg tablet Take 1 tablet (75 mg total) by mouth once daily.    ezetimibe (ZETIA) 10 mg tablet Take 1 tablet (10 mg total) by mouth once daily.    hydrALAZINE (APRESOLINE) 25 MG tablet Take 1 tablet (25 mg total) by mouth 2 (two) times daily.    lisinopriL-hydrochlorothiazide (PRINZIDE,ZESTORETIC) 20-12.5 mg per tablet Take 1 tablet  by mouth 2 (two) times a day.    metFORMIN (GLUCOPHAGE) 500 MG tablet Take 2 tablets (1,000 mg total) by mouth 2 (two) times daily with meals. Take 2 tabs twice daily    metoprolol tartrate (LOPRESSOR) 100 MG tablet Take 1 tablet (100 mg total) by mouth 2 (two) times daily.    paroxetine (PAXIL) 10 MG tablet Take 1 tablet (10 mg total) by mouth every morning.    pioglitazone (ACTOS) 45 MG tablet Take 1 tablet (45 mg total) by mouth once daily.    vitamin E 400 UNIT capsule Take 400 Units by mouth once daily.    [DISCONTINUED] pravastatin (PRAVACHOL) 20 MG tablet Take 1 tablet (20 mg total) by mouth once daily.     Family History       Problem Relation (Age of Onset)    Cancer Mother, Brother    Diabetes Sister    Heart disease Father, Sister          Tobacco Use    Smoking status: Former     Types: Cigarettes     Quit date:      Years since quittin.9    Smokeless tobacco: Never   Substance and Sexual Activity    Alcohol use: Never    Drug use: Never    Sexual activity: Not Currently     Review of Systems   Constitutional:  Negative for activity change, chills and fever.   HENT:  Negative for congestion and sore throat.    Respiratory:  Negative for cough, chest tightness and shortness of breath.    Cardiovascular:  Positive for chest pain. Negative for palpitations and leg swelling.   Gastrointestinal:  Negative for abdominal pain, nausea and vomiting.   Endocrine: Negative for cold intolerance and heat intolerance.   Musculoskeletal:  Negative for arthralgias and myalgias.   Skin:  Negative for color change and pallor.   Neurological:  Positive for weakness. Negative for dizziness and headaches.   Psychiatric/Behavioral:  Negative for agitation. The patient is not nervous/anxious.      Objective:     Vital Signs (Most Recent):  Temp: 98.6 °F (37 °C) (22)  Pulse: (!) 52 (22)  Resp: 17 (22)  BP: (!) 188/67 (22)  SpO2: 97 % (22)   Vital Signs (24h  Range):  Temp:  [97.7 °F (36.5 °C)-98.6 °F (37 °C)] 98.6 °F (37 °C)  Pulse:  [] 52  Resp:  [10-19] 17  SpO2:  [92 %-97 %] 97 %  BP: (118-188)/(58-95) 188/67     Weight: 59.4 kg (131 lb)  Body mass index is 26.46 kg/m².    Intake/Output Summary (Last 24 hours) at 11/18/2022 0944  Last data filed at 11/17/2022 1521  Gross per 24 hour   Intake 1000 ml   Output --   Net 1000 ml      Physical Exam  Constitutional:       General: She is not in acute distress.     Appearance: Normal appearance.   HENT:      Head: Normocephalic and atraumatic.      Right Ear: External ear normal.      Left Ear: External ear normal.      Nose: Nose normal.      Mouth/Throat:      Pharynx: Oropharynx is clear.   Eyes:      Extraocular Movements: Extraocular movements intact.      Pupils: Pupils are equal, round, and reactive to light.   Cardiovascular:      Rate and Rhythm: Normal rate. Rhythm irregular.      Pulses: Normal pulses.      Heart sounds: Normal heart sounds.   Pulmonary:      Effort: Pulmonary effort is normal.      Breath sounds: Normal breath sounds.   Abdominal:      General: Abdomen is flat.      Palpations: Abdomen is soft.      Tenderness: There is no abdominal tenderness.   Musculoskeletal:         General: Normal range of motion.      Cervical back: Normal range of motion.      Right lower leg: No edema.      Left lower leg: No edema.   Skin:     General: Skin is warm and dry.      Capillary Refill: Capillary refill takes less than 2 seconds.   Neurological:      Mental Status: She is alert and oriented to person, place, and time.      Comments: Left sided weakness from previous CVA   Psychiatric:         Mood and Affect: Mood normal.         Thought Content: Thought content normal.       Significant Labs: All pertinent labs within the past 24 hours have been reviewed.    Significant Imaging: I have reviewed all pertinent imaging results/findings within the past 24 hours.    Medical Student Subjective & Objective

## 2022-11-18 NOTE — SUBJECTIVE & OBJECTIVE
Past Medical History:   Diagnosis Date    Benign paroxysmal vertigo     Cardiomyopathy     Diabetes     Hypertension     Pulmonary HTN     Stroke     affected balance     Takotsubo cardiomyopathy     Valvular regurgitation        Past Surgical History:   Procedure Laterality Date    CATARACT EXTRACTION W/ INTRAOCULAR LENS  IMPLANT, BILATERAL      EYE SURGERY      HYSTERECTOMY         Review of patient's allergies indicates:   Allergen Reactions    Pravastatin Rash       No current facility-administered medications on file prior to encounter.     Current Outpatient Medications on File Prior to Encounter   Medication Sig    clindamycin (CLEOCIN T) 1 % lotion APPLY TOPICALLY TWICE DAILY TO THE AFFECTED AREA(S) OF BOTH ARMPITS    clopidogreL (PLAVIX) 75 mg tablet Take 1 tablet (75 mg total) by mouth once daily.    ezetimibe (ZETIA) 10 mg tablet Take 1 tablet (10 mg total) by mouth once daily.    hydrALAZINE (APRESOLINE) 25 MG tablet Take 1 tablet (25 mg total) by mouth 2 (two) times daily.    lisinopriL-hydrochlorothiazide (PRINZIDE,ZESTORETIC) 20-12.5 mg per tablet Take 1 tablet by mouth 2 (two) times a day.    metFORMIN (GLUCOPHAGE) 500 MG tablet Take 2 tablets (1,000 mg total) by mouth 2 (two) times daily with meals. Take 2 tabs twice daily    metoprolol tartrate (LOPRESSOR) 100 MG tablet Take 1 tablet (100 mg total) by mouth 2 (two) times daily.    paroxetine (PAXIL) 10 MG tablet Take 1 tablet (10 mg total) by mouth every morning.    pioglitazone (ACTOS) 45 MG tablet Take 1 tablet (45 mg total) by mouth once daily.    vitamin E 400 UNIT capsule Take 400 Units by mouth once daily.    [DISCONTINUED] pravastatin (PRAVACHOL) 20 MG tablet Take 1 tablet (20 mg total) by mouth once daily.     Family History       Problem Relation (Age of Onset)    Cancer Mother, Brother    Diabetes Sister    Heart disease Father, Sister          Tobacco Use    Smoking status: Former     Types: Cigarettes     Quit date: 1987     Years since  quittin.9    Smokeless tobacco: Never   Substance and Sexual Activity    Alcohol use: Never    Drug use: Never    Sexual activity: Not Currently     Review of Systems   Constitutional:  Negative for appetite change, fatigue and fever.   HENT:  Negative for congestion, hearing loss and trouble swallowing.    Respiratory:  Positive for shortness of breath. Negative for chest tightness and wheezing.    Cardiovascular:  Positive for chest pain. Negative for palpitations.   Gastrointestinal:  Positive for nausea and vomiting. Negative for abdominal pain and constipation.   Genitourinary:  Negative for difficulty urinating and dysuria.   Musculoskeletal:  Positive for gait problem. Negative for back pain and neck stiffness.   Skin:  Negative for pallor and rash.   Neurological:  Positive for weakness. Negative for dizziness, speech difficulty and headaches.   Psychiatric/Behavioral:  Negative for confusion and suicidal ideas.    Objective:     Vital Signs (Most Recent):  Temp: 98.6 °F (37 °C) (22)  Pulse: (!) 52 (22)  Resp: 17 (22)  BP: (!) 188/67 (22)  SpO2: 97 % (22)   Vital Signs (24h Range):  Temp:  [97.7 °F (36.5 °C)-98.6 °F (37 °C)] 98.6 °F (37 °C)  Pulse:  [] 52  Resp:  [10-19] 17  SpO2:  [92 %-97 %] 97 %  BP: (118-188)/(58-95) 188/67     Weight: 59.4 kg (131 lb)  Body mass index is 26.46 kg/m².    Physical Exam  Vitals reviewed.   Constitutional:       General: She is awake. She is not in acute distress.     Appearance: She is well-developed. She is not toxic-appearing.   HENT:      Head: Normocephalic.      Nose: Nose normal.      Mouth/Throat:      Pharynx: Oropharynx is clear.   Eyes:      Extraocular Movements: Extraocular movements intact.      Pupils: Pupils are equal, round, and reactive to light.   Neck:      Thyroid: No thyroid mass.      Vascular: No carotid bruit.   Cardiovascular:      Rate and Rhythm: Normal rate and regular rhythm.       Pulses: Normal pulses.      Heart sounds: Normal heart sounds. No murmur heard.  Pulmonary:      Effort: Pulmonary effort is normal.      Breath sounds: Normal breath sounds and air entry. No wheezing.   Abdominal:      General: Bowel sounds are normal. There is no distension.      Palpations: Abdomen is soft.      Tenderness: There is no abdominal tenderness.   Musculoskeletal:         General: Normal range of motion.      Cervical back: Neck supple. No rigidity.   Skin:     General: Skin is warm.      Coloration: Skin is not jaundiced.      Findings: No lesion.   Neurological:      Mental Status: She is alert and oriented to person, place, and time.      Cranial Nerves: No cranial nerve deficit.      Comments:   RUE   4 / 5  LUE    5 /5  RLE    4 / 5  LLE    5 / 5   Psychiatric:         Attention and Perception: Attention normal.         Mood and Affect: Mood normal.         Behavior: Behavior normal. Behavior is cooperative.         Thought Content: Thought content normal.         Cognition and Memory: Cognition normal.         CRANIAL NERVES     CN III, IV, VI   Pupils are equal, round, and reactive to light.     Significant Labs: All pertinent labs within the past 24 hours have been reviewed.  BMP:     Significant Imaging: I have reviewed all pertinent imaging results/findings within the past 24 hours.

## 2022-11-18 NOTE — ASSESSMENT & PLAN NOTE
Cardiology to see  Obtain prior left heart catheterization results    11/18 markedly elevated troponin today.  Doubt PE but check D-dimer.  Echocardiogram pending

## 2022-11-18 NOTE — PROGRESS NOTES
"[facility] - [address] [place] [address] Medicine  Progress Note    Patient Name: Erlinda Cuevas  MRN: [id number]  Patient Class: IP- Inpatient   Admission Date: 11/17/2022  Length of Stay: 1 days  Attending Physician: Lonnie Deleon MD  Primary Care Provider: Rigoberto Espinosa III, DO        Subjective:     Principal Problem:Atrial fibrillation with RVR        HPI:  Chief complaint:  Chest pain    History of present illness:    Ms. Cuevas is 77-year-old presented to the emergency room with on and off chest pain over the last 2 weeks.  Has occurred with rest but also with exertion.  Symptoms associated with shortness of breaths.  Worse episode earlier day of admission associated with some diaphoresis and nausea vomiting x1.  When seen in the emergency room was noted to have atrial fibrillation with rate 120.  No prior history of atrial fibrillation.  Since heart rates been more controlled in the 60s.  Was noted have elevated troponin.  Patient is followed by Dr. Nuñez for nonischemic cardiomyopathy.  States told in the past she had "broken heart with normal coronary arteries.     Review of systems:  See above.  Patient gets occasional jerking movement that causes her to fall.  She is been evaluated in the past in Neurology Clinic by Diego Benitez NP.  States has a Rollator but does not use it often.  Four weeks ago had syncopal like episode where she fell and thinks she might have been unconscious just for a few seconds.  No tongue biting or incontinence.  No chest pain or palpitation.  States Dr. Espinosa adjust raised her blood pressure medicine up and she thinks he gave her too much.   Review systems otherwise negative.    Past medical history:  -essential hypertension times 20 years.  -diabetes mellitus type 2 diagnosed in 1999 controlled with oral agents.  -cerebrovascular accident x2.  States with 1st stroke lost partial peripheral vision to left eye.  Second stroke caused weakness " to right side left with weakness to left upper extremity and some paresthesias there.    Past surgical history:  Removed floater from right eye  Bilateral cataract surgery   section    Social history:  Lives alone  States   2 years earlier  Son lives in [place]  States has neighbors that she can call if needs help  No history of tobacco alcohol use    Family history:  No one in family premature coronary artery disease.    Health maintenance issues:  Has seen Dr. ROCHA in past but plans on getting a new doctor and was prior recommended to see Dr. Hurd.  Does not take flu shots Pneumovax or any prior COVID vaccination  Does not do Pap smears, mammograms or colonoscopies and understands these are important cancer screening studies but not does not plan to have done.             Overview/Hospital Course:   - no additional chest pain.  No shortness of breath.  Main complaint is wanting something to eat.  Markedly elevated troponin.  Cardiology to see.  Await studies.      Past Medical History:   Diagnosis Date    Benign paroxysmal vertigo     Cardiomyopathy     Diabetes     Hypertension     Pulmonary HTN     Stroke     affected balance     Takotsubo cardiomyopathy     Valvular regurgitation        Past Surgical History:   Procedure Laterality Date    CATARACT EXTRACTION W/ INTRAOCULAR LENS  IMPLANT, BILATERAL      EYE SURGERY      HYSTERECTOMY         Review of patient's allergies indicates:   Allergen Reactions    Pravastatin Rash       No current facility-administered medications on file prior to encounter.     Current Outpatient Medications on File Prior to Encounter   Medication Sig    clindamycin (CLEOCIN T) 1 % lotion APPLY TOPICALLY TWICE DAILY TO THE AFFECTED AREA(S) OF BOTH ARMPITS    clopidogreL (PLAVIX) 75 mg tablet Take 1 tablet (75 mg total) by mouth once daily.    ezetimibe (ZETIA) 10 mg tablet Take 1 tablet (10 mg total) by mouth once daily.    hydrALAZINE  (APRESOLINE) 25 MG tablet Take 1 tablet (25 mg total) by mouth 2 (two) times daily.    lisinopriL-hydrochlorothiazide (PRINZIDE,ZESTORETIC) 20-12.5 mg per tablet Take 1 tablet by mouth 2 (two) times a day.    metFORMIN (GLUCOPHAGE) 500 MG tablet Take 2 tablets (1,000 mg total) by mouth 2 (two) times daily with meals. Take 2 tabs twice daily    metoprolol tartrate (LOPRESSOR) 100 MG tablet Take 1 tablet (100 mg total) by mouth 2 (two) times daily.    paroxetine (PAXIL) 10 MG tablet Take 1 tablet (10 mg total) by mouth every morning.    pioglitazone (ACTOS) 45 MG tablet Take 1 tablet (45 mg total) by mouth once daily.    vitamin E 400 UNIT capsule Take 400 Units by mouth once daily.    [DISCONTINUED] pravastatin (PRAVACHOL) 20 MG tablet Take 1 tablet (20 mg total) by mouth once daily.     Family History       Problem Relation (Age of Onset)    Cancer Mother, Brother    Diabetes Sister    Heart disease Father, Sister          Tobacco Use    Smoking status: Former     Types: Cigarettes     Quit date:      Years since quittin.9    Smokeless tobacco: Never   Substance and Sexual Activity    Alcohol use: Never    Drug use: Never    Sexual activity: Not Currently     Review of Systems   Constitutional:  Negative for appetite change, fatigue and fever.   HENT:  Negative for congestion, hearing loss and trouble swallowing.    Respiratory:  Positive for shortness of breath. Negative for chest tightness and wheezing.    Cardiovascular:  Positive for chest pain. Negative for palpitations.   Gastrointestinal:  Positive for nausea and vomiting. Negative for abdominal pain and constipation.   Genitourinary:  Negative for difficulty urinating and dysuria.   Musculoskeletal:  Positive for gait problem. Negative for back pain and neck stiffness.   Skin:  Negative for pallor and rash.   Neurological:  Positive for weakness. Negative for dizziness, speech difficulty and headaches.   Psychiatric/Behavioral:  Negative  for confusion and suicidal ideas.    Objective:     Vital Signs (Most Recent):  Temp: 98.6 °F (37 °C) (11/18/22 0700)  Pulse: (!) 52 (11/18/22 0700)  Resp: 17 (11/18/22 0700)  BP: (!) 188/67 (11/18/22 0700)  SpO2: 97 % (11/18/22 0700)   Vital Signs (24h Range):  Temp:  [97.7 °F (36.5 °C)-98.6 °F (37 °C)] 98.6 °F (37 °C)  Pulse:  [] 52  Resp:  [10-19] 17  SpO2:  [92 %-97 %] 97 %  BP: (118-188)/(58-95) 188/67     Weight: 59.4 kg (131 lb)  Body mass index is 26.46 kg/m².    Physical Exam  Vitals reviewed.   Constitutional:       General: She is awake. She is not in acute distress.     Appearance: She is well-developed. She is not toxic-appearing.   HENT:      Head: Normocephalic.      Nose: Nose normal.      Mouth/Throat:      Pharynx: Oropharynx is clear.   Eyes:      Extraocular Movements: Extraocular movements intact.      Pupils: Pupils are equal, round, and reactive to light.   Neck:      Thyroid: No thyroid mass.      Vascular: No carotid bruit.   Cardiovascular:      Rate and Rhythm: Normal rate and regular rhythm.      Pulses: Normal pulses.      Heart sounds: Normal heart sounds. No murmur heard.  Pulmonary:      Effort: Pulmonary effort is normal.      Breath sounds: Normal breath sounds and air entry. No wheezing.   Abdominal:      General: Bowel sounds are normal. There is no distension.      Palpations: Abdomen is soft.      Tenderness: There is no abdominal tenderness.   Musculoskeletal:         General: Normal range of motion.      Cervical back: Neck supple. No rigidity.   Skin:     General: Skin is warm.      Coloration: Skin is not jaundiced.      Findings: No lesion.   Neurological:      Mental Status: She is alert and oriented to person, place, and time.      Cranial Nerves: No cranial nerve deficit.      Comments:   RUE   4 / 5  LUE    5 /5  RLE    4 / 5  LLE    5 / 5   Psychiatric:         Attention and Perception: Attention normal.         Mood and Affect: Mood normal.         Behavior:  Behavior normal. Behavior is cooperative.         Thought Content: Thought content normal.         Cognition and Memory: Cognition normal.         CRANIAL NERVES     CN III, IV, VI   Pupils are equal, round, and reactive to light.     Significant Labs: All pertinent labs within the past 24 hours have been reviewed.  BMP:     Significant Imaging: I have reviewed all pertinent imaging results/findings within the past 24 hours.    Imaging Results              X-Ray Chest AP Portable (Final result)  Result time 11/17/22 12:41:39      Final result by Haroon Arana DO (11/17/22 12:41:39)                   Impression:      No acute pulmonary disease      Electronically signed by: Haroon Arana  Date:    11/17/2022  Time:    12:41               Narrative:    EXAMINATION:  XR CHEST AP PORTABLE    CLINICAL HISTORY:  Chest pain, unspecified    TECHNIQUE:  XR CHEST AP PORTABLE    COMPARISON:  2021    FINDINGS:  No lines or tubes.    Lungs are clear.    Normal pleura.    Cardiac silhouette is similar to comparison exam.    No obvious acute bone findings.                                    Intake/Output - Last 3 Shifts         11/16 0700  11/17 0659 11/17 0700  11/18 0659 11/18 0700  11/19 0659    IV Piggyback  1000     Total Intake(mL/kg)  1000 (16.8)     Net  +1000            Urine Occurrence  1 x           Microbiology Results (last 7 days)       ** No results found for the last 168 hours. **          Lab Results   Component Value Date    WBC 6.22 11/18/2022    HGB 11.9 (L) 11/18/2022    HCT 34.1 (L) 11/18/2022    MCV 94.5 11/18/2022     11/18/2022        CMP  Sodium   Date Value Ref Range Status   11/18/2022 142 136 - 145 mmol/L Final     Potassium   Date Value Ref Range Status   11/18/2022 3.3 (L) 3.5 - 5.1 mmol/L Final     Chloride   Date Value Ref Range Status   11/18/2022 104 98 - 107 mmol/L Final     CO2   Date Value Ref Range Status   11/18/2022 29 21 - 32 mmol/L Final     Glucose   Date Value Ref Range  Status   11/18/2022 207 (H) 74 - 106 mg/dL Final     BUN   Date Value Ref Range Status   11/18/2022 20 (H) 7 - 18 mg/dL Final     Creatinine   Date Value Ref Range Status   11/18/2022 1.08 (H) 0.55 - 1.02 mg/dL Final     Calcium   Date Value Ref Range Status   11/18/2022 8.8 8.5 - 10.1 mg/dL Final     Total Protein   Date Value Ref Range Status   11/17/2022 7.3 6.4 - 8.2 g/dL Final     Albumin   Date Value Ref Range Status   11/17/2022 4.0 3.5 - 5.0 g/dL Final     Bilirubin, Total   Date Value Ref Range Status   11/17/2022 0.5 >0.0 - 1.2 mg/dL Final     Alk Phos   Date Value Ref Range Status   11/17/2022 96 55 - 142 U/L Final     AST   Date Value Ref Range Status   11/17/2022 32 15 - 37 U/L Final     ALT   Date Value Ref Range Status   11/17/2022 35 13 - 56 U/L Final     Anion Gap   Date Value Ref Range Status   11/18/2022 12 7 - 16 mmol/L Final     eGFR   Date Value Ref Range Status   06/22/2022 54 (L) >=60 mL/min/1.73m² Final      BMP  Lab Results   Component Value Date     11/18/2022    K 3.3 (L) 11/18/2022     11/18/2022    CO2 29 11/18/2022    BUN 20 (H) 11/18/2022    CREATININE 1.08 (H) 11/18/2022    CALCIUM 8.8 11/18/2022    ANIONGAP 12 11/18/2022    EGFRNONAA 54 (L) 06/22/2022            Assessment/Plan:      * Atrial fibrillation with RVR  Patient with new onset atrial fibrillation which is controlled currently with Beta Blocker. Patient is currently in atrial fibrillation.[id number] Score: 7. HASBLED Score: 3 ( moderate risk of bleeding in range of 3.7-6% per year). Anticoagulation indicated. Anticoagulation done with heparin.        Elevated troponin  Cardiology to see  Obtain prior left heart catheterization results    11/18 markedly elevated troponin today.  Doubt PE but check D-dimer.  Echocardiogram pending    Frequent falls  Physical therapy to see      Bilateral carotid artery stenosis  Outpatient follow-up      Takotsubo cardiomyopathy  Obtain old left heart catheterization  results      Hemiparesis affecting left side as late effect of cerebrovascular accident (CVA)  Therapy team to see especially with history of recent falls      Systolic hypertension    Adjust medications as needed    Type 2 diabetes mellitus, without long-term current use of insulin  Patient's FSGs are controlled on current medication regimen.  Last A1c reviewed-   Lab Results   Component Value Date    HGBA1C 7.7 (H) 06/22/2022     Most recent fingerstick glucose reviewed- No results for input(s): POCTGLUCOSE in the last 24 hours.  Current correctional scale  Medium  Maintain anti-hyperglycemic dose as follows-   Antihyperglycemics (From admission, onward)    Start     Stop Route Frequency Ordered    11/18/22 0900  pioglitazone tablet 45 mg         -- Oral Daily 11/17/22 1959 11/17/22 2054  insulin aspart U-100 injection 1-10 Units         -- SubQ Before meals & nightly PRN 11/17/22 1959        Hold Oral hypoglycemics while patient is in the hospital.      VTE Risk Mitigation (From admission, onward)         Ordered     heparin 25,000 units in dextrose 5% 250 mL (100 units/mL) infusion LOW INTENSITY nomogram - [facility]  Continuous        Question:  Begin at (in units/kg/hr)  Answer:  12    11/17/22 2002     heparin 25,000 units in dextrose 5% (100 units/ml) IV bolus from bag - ADDITIONAL PRN BOLUS - 60 units/kg  As needed (PRN)        Question:  Heparin Infusion Adjustment (DO NOT MODIFY ANSWER)  Answer:  [email or web address]\Images\Pharmacy\HeparinInfusions\heparin LOW INTENSITY nomogram for [place] [id number].[email or web address]    11/17/22 2002     heparin 25,000 units in dextrose 5% (100 units/ml) IV bolus from bag - ADDITIONAL PRN BOLUS - 30 units/kg  As needed (PRN)        Question:  Heparin Infusion Adjustment (DO NOT MODIFY ANSWER)  Answer:  \[email or web address]\[email or web address]\Images\Pharmacy\HeparinInfusions\heparin LOW INTENSITY nomogram for [place] [id number].pdf    11/17/22 2002     Reason for No Pharmacological VTE Prophylaxis  Once        Question:   Reasons:  Answer:  Already adequately anticoagulated on oral Anticoagulants    11/17/22 1959     IP VTE HIGH RISK PATIENT  Once         11/17/22 1959     Place sequential compression device  Until discontinued         11/17/22 1959                Discharge Planning   TOM:      Code Status: Full Code   Is the patient medically ready for discharge?:     Reason for patient still in hospital (select all that apply): Laboratory test, Treatment, Imaging, Consult recommendations and PT / OT recommendations                     Lonnie Deleon MD  Department of Hospital Medicine   [facility] - [facility] Telemetry

## 2022-11-18 NOTE — SUBJECTIVE & OBJECTIVE
Past Medical History:   Diagnosis Date    Benign paroxysmal vertigo     Cardiomyopathy     Diabetes     Hypertension     Pulmonary HTN     Stroke     affected balance     Takotsubo cardiomyopathy     Valvular regurgitation        Past Surgical History:   Procedure Laterality Date    CATARACT EXTRACTION W/ INTRAOCULAR LENS  IMPLANT, BILATERAL      EYE SURGERY      HYSTERECTOMY         Review of patient's allergies indicates:   Allergen Reactions    Pravastatin Rash       No current facility-administered medications on file prior to encounter.     Current Outpatient Medications on File Prior to Encounter   Medication Sig    clindamycin (CLEOCIN T) 1 % lotion APPLY TOPICALLY TWICE DAILY TO THE AFFECTED AREA(S) OF BOTH ARMPITS    clopidogreL (PLAVIX) 75 mg tablet Take 1 tablet (75 mg total) by mouth once daily.    ezetimibe (ZETIA) 10 mg tablet Take 1 tablet (10 mg total) by mouth once daily.    hydrALAZINE (APRESOLINE) 25 MG tablet Take 1 tablet (25 mg total) by mouth 2 (two) times daily.    lisinopriL-hydrochlorothiazide (PRINZIDE,ZESTORETIC) 20-12.5 mg per tablet Take 1 tablet by mouth 2 (two) times a day.    metFORMIN (GLUCOPHAGE) 500 MG tablet Take 2 tablets (1,000 mg total) by mouth 2 (two) times daily with meals. Take 2 tabs twice daily    metoprolol tartrate (LOPRESSOR) 100 MG tablet Take 1 tablet (100 mg total) by mouth 2 (two) times daily.    paroxetine (PAXIL) 10 MG tablet Take 1 tablet (10 mg total) by mouth every morning.    pioglitazone (ACTOS) 45 MG tablet Take 1 tablet (45 mg total) by mouth once daily.    vitamin E 400 UNIT capsule Take 400 Units by mouth once daily.    [DISCONTINUED] pravastatin (PRAVACHOL) 20 MG tablet Take 1 tablet (20 mg total) by mouth once daily.     Family History       Problem Relation (Age of Onset)    Cancer Mother, Brother    Diabetes Sister    Heart disease Father, Sister          Tobacco Use    Smoking status: Former     Types: Cigarettes     Quit date: 1987     Years since  quittin.9    Smokeless tobacco: Never   Substance and Sexual Activity    Alcohol use: Never    Drug use: Never    Sexual activity: Not Currently     Review of Systems   Constitutional:  Negative for appetite change, fatigue and fever.   HENT:  Negative for congestion, hearing loss and trouble swallowing.    Respiratory:  Positive for shortness of breath. Negative for chest tightness and wheezing.    Cardiovascular:  Positive for chest pain. Negative for palpitations.   Gastrointestinal:  Positive for nausea and vomiting. Negative for abdominal pain and constipation.   Genitourinary:  Negative for difficulty urinating and dysuria.   Musculoskeletal:  Positive for gait problem. Negative for back pain and neck stiffness.   Skin:  Negative for pallor and rash.   Neurological:  Positive for weakness. Negative for dizziness, speech difficulty and headaches.   Psychiatric/Behavioral:  Negative for confusion and suicidal ideas.    Objective:     Vital Signs (Most Recent):  Temp: 98.6 °F (37 °C) (22)  Pulse: (!) 52 (22)  Resp: 17 (22)  BP: (!) 188/67 (22)  SpO2: 97 % (22)   Vital Signs (24h Range):  Temp:  [97.7 °F (36.5 °C)-98.6 °F (37 °C)] 98.6 °F (37 °C)  Pulse:  [] 52  Resp:  [10-19] 17  SpO2:  [92 %-97 %] 97 %  BP: (118-188)/(58-95) 188/67     Weight: 59.4 kg (131 lb)  Body mass index is 26.46 kg/m².    Physical Exam  Vitals reviewed.   Constitutional:       General: She is awake. She is not in acute distress.     Appearance: She is well-developed. She is not toxic-appearing.   HENT:      Head: Normocephalic.      Nose: Nose normal.      Mouth/Throat:      Pharynx: Oropharynx is clear.   Eyes:      Extraocular Movements: Extraocular movements intact.      Pupils: Pupils are equal, round, and reactive to light.   Neck:      Thyroid: No thyroid mass.      Vascular: No carotid bruit.   Cardiovascular:      Rate and Rhythm: Normal rate and regular rhythm.       Pulses: Normal pulses.      Heart sounds: Normal heart sounds. No murmur heard.  Pulmonary:      Effort: Pulmonary effort is normal.      Breath sounds: Normal breath sounds and air entry. No wheezing.   Abdominal:      General: Bowel sounds are normal. There is no distension.      Palpations: Abdomen is soft.      Tenderness: There is no abdominal tenderness.   Musculoskeletal:         General: Normal range of motion.      Cervical back: Neck supple. No rigidity.   Skin:     General: Skin is warm.      Coloration: Skin is not jaundiced.      Findings: No lesion.   Neurological:      Mental Status: She is alert and oriented to person, place, and time.      Cranial Nerves: No cranial nerve deficit.      Comments:   RUE   4 / 5  LUE    5 /5  RLE    4 / 5  LLE    5 / 5   Psychiatric:         Attention and Perception: Attention normal.         Mood and Affect: Mood normal.         Behavior: Behavior normal. Behavior is cooperative.         Thought Content: Thought content normal.         Cognition and Memory: Cognition normal.         CRANIAL NERVES     CN III, IV, VI   Pupils are equal, round, and reactive to light.     Significant Labs: All pertinent labs within the past 24 hours have been reviewed.  BMP:     Significant Imaging: I have reviewed all pertinent imaging results/findings within the past 24 hours.    Imaging Results              X-Ray Chest AP Portable (Final result)  Result time 11/17/22 12:41:39      Final result by Haroon Arana DO (11/17/22 12:41:39)                   Impression:      No acute pulmonary disease      Electronically signed by: Haroon Arana  Date:    11/17/2022  Time:    12:41               Narrative:    EXAMINATION:  XR CHEST AP PORTABLE    CLINICAL HISTORY:  Chest pain, unspecified    TECHNIQUE:  XR CHEST AP PORTABLE    COMPARISON:  2021    FINDINGS:  No lines or tubes.    Lungs are clear.    Normal pleura.    Cardiac silhouette is similar to comparison exam.    No obvious  acute bone findings.                                    Intake/Output - Last 3 Shifts         11/16 0700  11/17 0659 11/17 0700  11/18 0659 11/18 0700  11/19 0659    IV Piggyback  1000     Total Intake(mL/kg)  1000 (16.8)     Net  +1000            Urine Occurrence  1 x           Microbiology Results (last 7 days)       ** No results found for the last 168 hours. **          Lab Results   Component Value Date    WBC 6.22 11/18/2022    HGB 11.9 (L) 11/18/2022    HCT 34.1 (L) 11/18/2022    MCV 94.5 11/18/2022     11/18/2022        CMP  Sodium   Date Value Ref Range Status   11/18/2022 142 136 - 145 mmol/L Final     Potassium   Date Value Ref Range Status   11/18/2022 3.3 (L) 3.5 - 5.1 mmol/L Final     Chloride   Date Value Ref Range Status   11/18/2022 104 98 - 107 mmol/L Final     CO2   Date Value Ref Range Status   11/18/2022 29 21 - 32 mmol/L Final     Glucose   Date Value Ref Range Status   11/18/2022 207 (H) 74 - 106 mg/dL Final     BUN   Date Value Ref Range Status   11/18/2022 20 (H) 7 - 18 mg/dL Final     Creatinine   Date Value Ref Range Status   11/18/2022 1.08 (H) 0.55 - 1.02 mg/dL Final     Calcium   Date Value Ref Range Status   11/18/2022 8.8 8.5 - 10.1 mg/dL Final     Total Protein   Date Value Ref Range Status   11/17/2022 7.3 6.4 - 8.2 g/dL Final     Albumin   Date Value Ref Range Status   11/17/2022 4.0 3.5 - 5.0 g/dL Final     Bilirubin, Total   Date Value Ref Range Status   11/17/2022 0.5 >0.0 - 1.2 mg/dL Final     Alk Phos   Date Value Ref Range Status   11/17/2022 96 55 - 142 U/L Final     AST   Date Value Ref Range Status   11/17/2022 32 15 - 37 U/L Final     ALT   Date Value Ref Range Status   11/17/2022 35 13 - 56 U/L Final     Anion Gap   Date Value Ref Range Status   11/18/2022 12 7 - 16 mmol/L Final     eGFR   Date Value Ref Range Status   06/22/2022 54 (L) >=60 mL/min/1.73m² Final      BMP  Lab Results   Component Value Date     11/18/2022    K 3.3 (L) 11/18/2022      11/18/2022    CO2 29 11/18/2022    BUN 20 (H) 11/18/2022    CREATININE 1.08 (H) 11/18/2022    CALCIUM 8.8 11/18/2022    ANIONGAP 12 11/18/2022    EGFRNONAA 54 (L) 06/22/2022

## 2022-11-18 NOTE — PLAN OF CARE
[facility]  Initial Discharge Assessment       Primary Care Provider: Rigoberto Espinosa III, DO    Admission Diagnosis: Dyspnea [R06.00]  Chest pain [R07.9]  Atrial fibrillation with RVR [I48.91]    Admission Date: 11/17/2022  Expected Discharge Date:     Discharge Barriers Identified: None    Payor: MEDICARE / Plan: MEDICARE PART A & B / Product Type: Government /     Extended Emergency Contact Information  Primary Emergency Contact: Tam Cuevas  Mobile Phone: [phone]  Relation: Son  Preferred language: English   needed? No    Discharge Plan A: Home  Discharge Plan B: Home      [facility] [address] [place] [address]  [address] [place] [address]  Phone: [phone] Fax: [phone]    [facility] [address] [place] [address] [address] [place] [address]  [address] [place] [address]  Phone: [phone] Fax: [phone]    [facility] [place] [address]  Phone: [phone] Fax: [phone]      Initial Assessment (most recent)       Adult Discharge Assessment - 11/18/22 1135          Discharge Assessment    Assessment Type Discharge Planning Assessment     Source of Information patient     Communicated TOM with patient/caregiver Date not available/Unable to determine     Lives With alone     Do you expect to return to your current living situation? Yes     Do you have help at home or someone to help you manage your care at home? No     Prior to hospitilization cognitive status: Alert/Oriented     Current cognitive status: Alert/Oriented     Walking or Climbing Stairs Difficulty none     Dressing/Bathing Difficulty none     Home Accessibility wheelchair accessible     Home Layout Able to live on 1st floor     Equipment Currently Used at Home walker, rolling;power chair     Readmission within 30 days? No     Patient currently being followed by outpatient  case management? No     Do you currently have service(s) that help you manage your care at home? No     Do you take prescription medications? Yes     Do you have prescription coverage? Yes     Do you have any problems affording any of your prescribed medications? No     Is the patient taking medications as prescribed? yes     How do you get to doctors appointments? car, drives self     Are you on dialysis? No     Do you take coumadin? No     Discharge Plan A Home     Discharge Plan B Home     DME Needed Upon Discharge  none     Discharge Plan discussed with: Patient     Discharge Barriers Identified None        Physical Activity    On average, how many days per week do you engage in moderate to strenuous exercise (like a brisk walk)? 0 days     On average, how many minutes do you engage in exercise at this level? 0 min        Financial Resource Strain    How hard is it for you to pay for the very basics like food, housing, medical care, and heating? Not hard at all        Housing Stability    In the last 12 months, was there a time when you were not able to pay the mortgage or rent on time? No     In the last 12 months, how many places have you lived? 1     In the last 12 months, was there a time when you did not have a steady place to sleep or slept in a shelter (including now)? No        Transportation Needs    In the past 12 months, has lack of transportation kept you from medical appointments or from getting medications? No     In the past 12 months, has lack of transportation kept you from meetings, work, or from getting things needed for daily living? No        Food Insecurity    Within the past 12 months, you worried that your food would run out before you got the money to buy more. Never true     Within the past 12 months, the food you bought just didn't last and you didn't have money to get more. Never true        Stress    Do you feel stress - tense, restless, nervous, or anxious, or unable to sleep at  night because your mind is troubled all the time - these days? Only a little        Social Connections    In a typical week, how many times do you talk on the phone with family, friends, or neighbors? Twice a week     How often do you get together with friends or relatives? Twice a week     How often do you attend Denominational or Spiritism services? Never     Do you belong to any clubs or organizations such as Denominational groups, unions, fraternal or athletic groups, or school groups? Yes     How often do you attend meetings of the clubs or organizations you belong to? Never     Are you , , , , never , or living with a partner?         Alcohol Use    Q1: How often do you have a drink containing alcohol? Never     Q2: How many drinks containing alcohol do you have on a typical day when you are drinking? Patient does not drink     Q3: How often do you have six or more drinks on one occasion? Never                   Patient lives at home alone. She has a son and friends for support. She has a walker for home use. No HH pta. Plans to return home at discharge. IMM reviewed and signature obtained. SDOH complete. SS following for discharge needs as arise.

## 2022-11-19 PROBLEM — E87.6 HYPOKALEMIA: Status: ACTIVE | Noted: 2022-11-19

## 2022-11-19 PROBLEM — I25.9 CHEST PAIN DUE TO MYOCARDIAL ISCHEMIA: Status: ACTIVE | Noted: 2022-11-19

## 2022-11-19 LAB
ALBUMIN SERPL BCP-MCNC: 3.2 G/DL (ref 3.5–5)
ALBUMIN/GLOB SERPL: 1 {RATIO}
ALP SERPL-CCNC: 75 U/L (ref 55–142)
ALT SERPL W P-5'-P-CCNC: 24 U/L (ref 13–56)
ANION GAP SERPL CALCULATED.3IONS-SCNC: 10 MMOL/L (ref 7–16)
AST SERPL W P-5'-P-CCNC: 43 U/L (ref 15–37)
BASOPHILS # BLD AUTO: 0.03 K/UL (ref 0–0.2)
BASOPHILS NFR BLD AUTO: 0.3 % (ref 0–1)
BILIRUB SERPL-MCNC: 0.8 MG/DL (ref ?–1.2)
BUN SERPL-MCNC: 18 MG/DL (ref 7–18)
BUN/CREAT SERPL: 16 (ref 6–20)
CALCIUM SERPL-MCNC: 8.4 MG/DL (ref 8.5–10.1)
CHLORIDE SERPL-SCNC: 101 MMOL/L (ref 98–107)
CO2 SERPL-SCNC: 26 MMOL/L (ref 21–32)
CREAT SERPL-MCNC: 1.12 MG/DL (ref 0.55–1.02)
DIFFERENTIAL METHOD BLD: ABNORMAL
EGFR (NO RACE VARIABLE) (RUSH/TITUS): 51 ML/MIN/1.73M²
EOSINOPHIL # BLD AUTO: 0.02 K/UL (ref 0–0.5)
EOSINOPHIL NFR BLD AUTO: 0.2 % (ref 1–4)
ERYTHROCYTE [DISTWIDTH] IN BLOOD BY AUTOMATED COUNT: 11.5 % (ref 11.5–14.5)
GLOBULIN SER-MCNC: 3.3 G/DL (ref 2–4)
GLUCOSE SERPL-MCNC: 225 MG/DL (ref 70–105)
GLUCOSE SERPL-MCNC: 236 MG/DL (ref 74–106)
GLUCOSE SERPL-MCNC: 257 MG/DL (ref 70–105)
GLUCOSE SERPL-MCNC: 259 MG/DL (ref 70–105)
GLUCOSE SERPL-MCNC: 277 MG/DL (ref 70–105)
HCT VFR BLD AUTO: 33.7 % (ref 38–47)
HGB BLD-MCNC: 11.8 G/DL (ref 12–16)
IMM GRANULOCYTES # BLD AUTO: 0.05 K/UL (ref 0–0.04)
IMM GRANULOCYTES NFR BLD: 0.5 % (ref 0–0.4)
LYMPHOCYTES # BLD AUTO: 1.61 K/UL (ref 1–4.8)
LYMPHOCYTES NFR BLD AUTO: 17.5 % (ref 27–41)
MCH RBC QN AUTO: 32.8 PG (ref 27–31)
MCHC RBC AUTO-ENTMCNC: 35 G/DL (ref 32–36)
MCV RBC AUTO: 93.6 FL (ref 80–96)
MONOCYTES # BLD AUTO: 1.23 K/UL (ref 0–0.8)
MONOCYTES NFR BLD AUTO: 13.3 % (ref 2–6)
MPC BLD CALC-MCNC: 10.2 FL (ref 9.4–12.4)
NEUTROPHILS # BLD AUTO: 6.28 K/UL (ref 1.8–7.7)
NEUTROPHILS NFR BLD AUTO: 68.2 % (ref 53–65)
NRBC # BLD AUTO: 0 X10E3/UL
NRBC, AUTO (.00): 0 %
PLATELET # BLD AUTO: 204 K/UL (ref 150–400)
POTASSIUM SERPL-SCNC: 3.1 MMOL/L (ref 3.5–5.1)
PROT SERPL-MCNC: 6.5 G/DL (ref 6.4–8.2)
RBC # BLD AUTO: 3.6 M/UL (ref 4.2–5.4)
SODIUM SERPL-SCNC: 134 MMOL/L (ref 136–145)
WBC # BLD AUTO: 9.22 K/UL (ref 4.5–11)

## 2022-11-19 PROCEDURE — 85025 COMPLETE CBC W/AUTO DIFF WBC: CPT | Performed by: INTERNAL MEDICINE

## 2022-11-19 PROCEDURE — 63600175 PHARM REV CODE 636 W HCPCS: Performed by: HOSPITALIST

## 2022-11-19 PROCEDURE — 25000003 PHARM REV CODE 250: Performed by: FAMILY MEDICINE

## 2022-11-19 PROCEDURE — 25000003 PHARM REV CODE 250: Performed by: NURSE PRACTITIONER

## 2022-11-19 PROCEDURE — 82962 GLUCOSE BLOOD TEST: CPT

## 2022-11-19 PROCEDURE — 25000003 PHARM REV CODE 250: Performed by: INTERNAL MEDICINE

## 2022-11-19 PROCEDURE — 94761 N-INVAS EAR/PLS OXIMETRY MLT: CPT

## 2022-11-19 PROCEDURE — 80053 COMPREHEN METABOLIC PANEL: CPT | Performed by: INTERNAL MEDICINE

## 2022-11-19 PROCEDURE — 63600175 PHARM REV CODE 636 W HCPCS: Performed by: INTERNAL MEDICINE

## 2022-11-19 PROCEDURE — 25000003 PHARM REV CODE 250: Performed by: HOSPITALIST

## 2022-11-19 PROCEDURE — 11000001 HC ACUTE MED/SURG PRIVATE ROOM

## 2022-11-19 PROCEDURE — 99232 PR SUBSEQUENT HOSPITAL CARE,LEVL II: ICD-10-PCS | Mod: ,,, | Performed by: FAMILY MEDICINE

## 2022-11-19 PROCEDURE — 36415 COLL VENOUS BLD VENIPUNCTURE: CPT | Performed by: INTERNAL MEDICINE

## 2022-11-19 PROCEDURE — 99232 SBSQ HOSP IP/OBS MODERATE 35: CPT | Mod: ,,, | Performed by: FAMILY MEDICINE

## 2022-11-19 RX ORDER — ENOXAPARIN SODIUM 100 MG/ML
1 INJECTION SUBCUTANEOUS
Status: DISCONTINUED | OUTPATIENT
Start: 2022-11-19 | End: 2022-11-22

## 2022-11-19 RX ORDER — ISOSORBIDE MONONITRATE 30 MG/1
30 TABLET, EXTENDED RELEASE ORAL DAILY
Status: DISCONTINUED | OUTPATIENT
Start: 2022-11-19 | End: 2022-11-22 | Stop reason: HOSPADM

## 2022-11-19 RX ORDER — POTASSIUM CHLORIDE 20 MEQ/1
20 TABLET, EXTENDED RELEASE ORAL DAILY
Status: DISCONTINUED | OUTPATIENT
Start: 2022-11-19 | End: 2022-11-22

## 2022-11-19 RX ADMIN — PAROXETINE HYDROCHLORIDE 10 MG: 10 TABLET, FILM COATED ORAL at 06:11

## 2022-11-19 RX ADMIN — ISOSORBIDE MONONITRATE 30 MG: 30 TABLET, EXTENDED RELEASE ORAL at 03:11

## 2022-11-19 RX ADMIN — DILTIAZEM HYDROCHLORIDE 30 MG: 30 TABLET, FILM COATED ORAL at 02:11

## 2022-11-19 RX ADMIN — ENOXAPARIN SODIUM 60 MG: 60 INJECTION SUBCUTANEOUS at 02:11

## 2022-11-19 RX ADMIN — METOPROLOL TARTRATE 100 MG: 50 TABLET, FILM COATED ORAL at 08:11

## 2022-11-19 RX ADMIN — INSULIN ASPART 6 UNITS: 100 INJECTION, SOLUTION INTRAVENOUS; SUBCUTANEOUS at 05:11

## 2022-11-19 RX ADMIN — DILTIAZEM HYDROCHLORIDE 30 MG: 30 TABLET, FILM COATED ORAL at 06:11

## 2022-11-19 RX ADMIN — POTASSIUM CHLORIDE 20 MEQ: 20 TABLET, EXTENDED RELEASE ORAL at 03:11

## 2022-11-19 RX ADMIN — INSULIN ASPART 4 UNITS: 100 INJECTION, SOLUTION INTRAVENOUS; SUBCUTANEOUS at 06:11

## 2022-11-19 RX ADMIN — LISINOPRIL 20 MG: 20 TABLET ORAL at 09:11

## 2022-11-19 RX ADMIN — HYDROCHLOROTHIAZIDE 12.5 MG: 12.5 TABLET ORAL at 09:11

## 2022-11-19 RX ADMIN — PIOGLITAZONE 45 MG: 15 TABLET ORAL at 09:11

## 2022-11-19 RX ADMIN — HYDROCHLOROTHIAZIDE 12.5 MG: 12.5 TABLET ORAL at 08:11

## 2022-11-19 RX ADMIN — INSULIN ASPART 3 UNITS: 100 INJECTION, SOLUTION INTRAVENOUS; SUBCUTANEOUS at 09:11

## 2022-11-19 RX ADMIN — METOPROLOL TARTRATE 100 MG: 50 TABLET, FILM COATED ORAL at 09:11

## 2022-11-19 RX ADMIN — PANTOPRAZOLE SODIUM 40 MG: 40 TABLET, DELAYED RELEASE ORAL at 09:11

## 2022-11-19 RX ADMIN — LISINOPRIL 20 MG: 20 TABLET ORAL at 08:11

## 2022-11-19 RX ADMIN — HYDRALAZINE HYDROCHLORIDE 25 MG: 25 TABLET ORAL at 09:11

## 2022-11-19 RX ADMIN — EZETIMIBE 10 MG: 10 TABLET ORAL at 09:11

## 2022-11-19 RX ADMIN — CLOPIDOGREL BISULFATE 75 MG: 75 TABLET ORAL at 09:11

## 2022-11-19 NOTE — PROGRESS NOTES
"[facility] Medicine  Progress Note    Patient Name: Erlinda Cuevas  MRN: [id number]  Patient Class: IP- Inpatient   Admission Date: 11/17/2022  Length of Stay: 2 days  Attending Physician: Christopher Menendez DO  Primary Care Provider: Rigoberto Espinosa III, DO        Subjective:     Principal Problem:Atrial fibrillation with RVR    Interval history:  The patient was seen examined resting comfortably in bed, in no acute distress.  The patient states that she had an episode of dyspnea and chest pain overnight for which she was given nitroglycerin and the episode soon resolved.  She denies any chest pain or shortness of breath at this time.  She states cardiology has spoken with Cardiothoracic surgery and they recommended not to proceed with CABG procedure.  Cardiology plans on returning the patient to the cath lab and attempting to stent the patient's lesions.  Otherwise patient has no complaints at this time.    HPI:  Chief complaint:  Chest pain    History of present illness:    Ms. Cuevas is 77-year-old presented to the emergency room with on and off chest pain over the last 2 weeks.  Has occurred with rest but also with exertion.  Symptoms associated with shortness of breaths.  Worse episode earlier day of admission associated with some diaphoresis and nausea vomiting x1.  When seen in the emergency room was noted to have atrial fibrillation with rate 120.  No prior history of atrial fibrillation.  Since heart rates been more controlled in the 60s.  Was noted have elevated troponin.  Patient is followed by Dr. Nuñez for nonischemic cardiomyopathy.  States told in the past she had "broken heart with normal coronary arteries.     Review of systems:  See above.  Patient gets occasional jerking movement that causes her to fall.  She is been evaluated in the past in [facility] by Diego Benitez NP.  States has a Rollator but does not use it often.  Four weeks " ago had syncopal like episode where she fell and thinks she might have been unconscious just for a few seconds.  No tongue biting or incontinence.  No chest pain or palpitation.  States Dr. Espinosa adjust raised her blood pressure medicine up and she thinks he gave her too much.   Review systems otherwise negative.    Past medical history:  -essential hypertension times 20 years.  -diabetes mellitus type 2 diagnosed in  controlled with oral agents.  -cerebrovascular accident x2.  States with 1st stroke lost partial peripheral vision to left eye.  Second stroke caused weakness to right side left with weakness to left upper extremity and some paresthesias there.    Past surgical history:  Removed floater from right eye  Bilateral cataract surgery   section    Social history:  Lives alone  States   2 years earlier  Son lives in [place]  States has neighbors that she can call if needs help  No history of tobacco alcohol use    Family history:  No one in family premature coronary artery disease.    Health maintenance issues:  Has seen Dr. ROCHA in past but plans on getting a new doctor and was prior recommended to see Dr. Hurd.  Does not take flu shots Pneumovax or any prior COVID vaccination  Does not do Pap smears, mammograms or colonoscopies and understands these are important cancer screening studies but not does not plan to have done.             Overview/Hospital Course:   - no additional chest pain.  No shortness of breath.  Main complaint is wanting something to eat.  Markedly elevated troponin.  Cardiology to see.  Await studies.      Past Medical History:   Diagnosis Date    Benign paroxysmal vertigo     Cardiomyopathy     Diabetes     Hypertension     Pulmonary HTN     Stroke     affected balance     Takotsubo cardiomyopathy     Valvular regurgitation        Past Surgical History:   Procedure Laterality Date    CATARACT EXTRACTION W/ INTRAOCULAR LENS  IMPLANT, BILATERAL       EYE SURGERY      HYSTERECTOMY         Review of patient's allergies indicates:   Allergen Reactions    Pravastatin Rash       No current facility-administered medications on file prior to encounter.     Current Outpatient Medications on File Prior to Encounter   Medication Sig    clindamycin (CLEOCIN T) 1 % lotion APPLY TOPICALLY TWICE DAILY TO THE AFFECTED AREA(S) OF BOTH ARMPITS    clopidogreL (PLAVIX) 75 mg tablet Take 1 tablet (75 mg total) by mouth once daily.    ezetimibe (ZETIA) 10 mg tablet Take 1 tablet (10 mg total) by mouth once daily.    hydrALAZINE (APRESOLINE) 25 MG tablet Take 1 tablet (25 mg total) by mouth 2 (two) times daily.    lisinopriL-hydrochlorothiazide (PRINZIDE,ZESTORETIC) 20-12.5 mg per tablet Take 1 tablet by mouth 2 (two) times a day.    metFORMIN (GLUCOPHAGE) 500 MG tablet Take 2 tablets (1,000 mg total) by mouth 2 (two) times daily with meals. Take 2 tabs twice daily    metoprolol tartrate (LOPRESSOR) 100 MG tablet Take 1 tablet (100 mg total) by mouth 2 (two) times daily.    paroxetine (PAXIL) 10 MG tablet Take 1 tablet (10 mg total) by mouth every morning.    pioglitazone (ACTOS) 45 MG tablet Take 1 tablet (45 mg total) by mouth once daily.    vitamin E 400 UNIT capsule Take 400 Units by mouth once daily.    [DISCONTINUED] pravastatin (PRAVACHOL) 20 MG tablet Take 1 tablet (20 mg total) by mouth once daily.     Family History       Problem Relation (Age of Onset)    Cancer Mother, Brother    Diabetes Sister    Heart disease Father, Sister          Tobacco Use    Smoking status: Former     Types: Cigarettes     Quit date:      Years since quittin.9    Smokeless tobacco: Never   Substance and Sexual Activity    Alcohol use: Never    Drug use: Never    Sexual activity: Not Currently     Review of Systems   Constitutional:  Negative for appetite change, fatigue and fever.   HENT:  Negative for congestion, hearing loss and trouble swallowing.    Respiratory:   Positive for shortness of breath. Negative for chest tightness and wheezing.    Cardiovascular:  Positive for chest pain. Negative for palpitations.   Gastrointestinal:  Positive for nausea and vomiting. Negative for abdominal pain and constipation.   Genitourinary:  Negative for difficulty urinating and dysuria.   Musculoskeletal:  Positive for gait problem. Negative for back pain and neck stiffness.   Skin:  Negative for pallor and rash.   Neurological:  Positive for weakness. Negative for dizziness, speech difficulty and headaches.   Psychiatric/Behavioral:  Negative for confusion and suicidal ideas.    Objective:     Vital Signs (Most Recent):  Temp: 98.9 °F (37.2 °C) (11/19/22 1200)  Pulse: 60 (11/19/22 1200)  Resp: 19 (11/19/22 1200)  BP: (!) 137/59 (11/19/22 1200)  SpO2: 97 % (11/19/22 1200)   Vital Signs (24h Range):  Temp:  [97.5 °F (36.4 °C)-98.9 °F (37.2 °C)] 98.9 °F (37.2 °C)  Pulse:  [57-90] 60  Resp:  [17-22] 19  SpO2:  [80 %-99 %] 97 %  BP: (135-192)/() 137/59     Weight: 59.4 kg (130 lb 15.3 oz)  Body mass index is 26.45 kg/m².    Physical Exam  Vitals reviewed.   Constitutional:       General: She is awake. She is not in acute distress.     Appearance: She is well-developed. She is not toxic-appearing.   HENT:      Head: Normocephalic.      Nose: Nose normal.      Mouth/Throat:      Pharynx: Oropharynx is clear.   Eyes:      Extraocular Movements: Extraocular movements intact.      Pupils: Pupils are equal, round, and reactive to light.   Neck:      Thyroid: No thyroid mass.      Vascular: No carotid bruit.   Cardiovascular:      Rate and Rhythm: Normal rate and regular rhythm.      Pulses: Normal pulses.      Heart sounds: Normal heart sounds. No murmur heard.  Pulmonary:      Effort: Pulmonary effort is normal.      Breath sounds: Normal breath sounds and air entry. No wheezing.   Abdominal:      General: Bowel sounds are normal. There is no distension.      Palpations: Abdomen is soft.       Tenderness: There is no abdominal tenderness.   Musculoskeletal:         General: Normal range of motion.      Cervical back: Neck supple. No rigidity.   Skin:     General: Skin is warm.      Coloration: Skin is not jaundiced.      Findings: No lesion.   Neurological:      Mental Status: She is alert and oriented to person, place, and time.      Cranial Nerves: No cranial nerve deficit.      Comments:   RUE   4 / 5  LUE    5 /5  RLE    4 / 5  LLE    5 / 5   Psychiatric:         Attention and Perception: Attention normal.         Mood and Affect: Mood normal.         Behavior: Behavior normal. Behavior is cooperative.         Thought Content: Thought content normal.         Cognition and Memory: Cognition normal.         CRANIAL NERVES     CN III, IV, VI   Pupils are equal, round, and reactive to light.     Significant Labs: All pertinent labs within the past 24 hours have been reviewed.  BMP:     Significant Imaging: I have reviewed all pertinent imaging results/findings within the past 24 hours.    Imaging Results              X-Ray Chest AP Portable (Final result)  Result time 11/17/22 12:41:39      Final result by Haroon Arana DO (11/17/22 12:41:39)                   Impression:      No acute pulmonary disease      Electronically signed by: Haroon Arana  Date:    11/17/2022  Time:    12:41               Narrative:    EXAMINATION:  XR CHEST AP PORTABLE    CLINICAL HISTORY:  Chest pain, unspecified    TECHNIQUE:  XR CHEST AP PORTABLE    COMPARISON:  2021    FINDINGS:  No lines or tubes.    Lungs are clear.    Normal pleura.    Cardiac silhouette is similar to comparison exam.    No obvious acute bone findings.                                    Intake/Output - Last 3 Shifts         11/17 0700  11/18 0659 11/18 0700 11/19 0659 11/19 0700 11/20 0659    IV Piggyback 1000      Total Intake(mL/kg) 1000 (16.8)      Net +1000             Urine Occurrence 1 x 2 x           Microbiology Results (last 7 days)        ** No results found for the last 168 hours. **          Lab Results   Component Value Date    WBC 9.22 11/19/2022    HGB 11.8 (L) 11/19/2022    HCT 33.7 (L) 11/19/2022    MCV 93.6 11/19/2022     11/19/2022        CMP  Sodium   Date Value Ref Range Status   11/19/2022 134 (L) 136 - 145 mmol/L Final     Potassium   Date Value Ref Range Status   11/19/2022 3.1 (L) 3.5 - 5.1 mmol/L Final     Chloride   Date Value Ref Range Status   11/19/2022 101 98 - 107 mmol/L Final     CO2   Date Value Ref Range Status   11/19/2022 26 21 - 32 mmol/L Final     Glucose   Date Value Ref Range Status   11/19/2022 236 (H) 74 - 106 mg/dL Final     BUN   Date Value Ref Range Status   11/19/2022 18 7 - 18 mg/dL Final     Creatinine   Date Value Ref Range Status   11/19/2022 1.12 (H) 0.55 - 1.02 mg/dL Final     Calcium   Date Value Ref Range Status   11/19/2022 8.4 (L) 8.5 - 10.1 mg/dL Final     Total Protein   Date Value Ref Range Status   11/19/2022 6.5 6.4 - 8.2 g/dL Final     Albumin   Date Value Ref Range Status   11/19/2022 3.2 (L) 3.5 - 5.0 g/dL Final     Bilirubin, Total   Date Value Ref Range Status   11/19/2022 0.8 >0.0 - 1.2 mg/dL Final     Alk Phos   Date Value Ref Range Status   11/19/2022 75 55 - 142 U/L Final     AST   Date Value Ref Range Status   11/19/2022 43 (H) 15 - 37 U/L Final     ALT   Date Value Ref Range Status   11/19/2022 24 13 - 56 U/L Final     Anion Gap   Date Value Ref Range Status   11/19/2022 10 7 - 16 mmol/L Final     eGFR   Date Value Ref Range Status   06/22/2022 54 (L) >=60 mL/min/1.73m² Final      BMP  Lab Results   Component Value Date     (L) 11/19/2022    K 3.1 (L) 11/19/2022     11/19/2022    CO2 26 11/19/2022    BUN 18 11/19/2022    CREATININE 1.12 (H) 11/19/2022    CALCIUM 8.4 (L) 11/19/2022    ANIONGAP 10 11/19/2022    EGFRNONAA 54 (L) 06/22/2022            Assessment/Plan:      * Atrial fibrillation with RVR  Patient with new onset atrial fibrillation which is controlled  currently with Beta Blocker. Patient is currently in atrial fibrillation.[id number] Score: 7. HASBLED Score: 3 ( moderate risk of bleeding in range of 3.7-6% per year). Anticoagulation indicated. Anticoagulation done with heparin.    Currently rate controlled.        Elevated troponin  Cardiology to see  Obtain prior left heart catheterization results    11/18 markedly elevated troponin today.  Doubt PE but check D-dimer.  Echocardiogram pending    Frequent falls  Physical therapy to see      Bilateral carotid artery stenosis  Outpatient follow-up      Takotsubo cardiomyopathy  Cardiology plans on returning patient to cath lab for further stenting.      Hemiparesis affecting left side as late effect of cerebrovascular accident (CVA)  Therapy team to see especially with history of recent falls.  Continue PT/OT.      Systolic hypertension    Adjust medications as needed    Type 2 diabetes mellitus, without long-term current use of insulin  Patient's FSGs are controlled on current medication regimen.  Last A1c reviewed-   Lab Results   Component Value Date    HGBA1C 7.7 (H) 06/22/2022     Most recent fingerstick glucose reviewed- No results for input(s): POCTGLUCOSE in the last 24 hours.  Current correctional scale  Medium  Maintain anti-hyperglycemic dose as follows-   Antihyperglycemics (From admission, onward)    Start     Stop Route Frequency Ordered    11/18/22 0900  pioglitazone tablet 45 mg         -- Oral Daily 11/17/22 1959 11/17/22 2054  insulin aspart U-100 injection 1-10 Units         -- SubQ Before meals & nightly PRN 11/17/22 1959        Hold Oral hypoglycemics while patient is in the hospital.    VTE Risk Mitigation (From admission, onward)         Ordered     enoxaparin injection 60 mg  Every 12 hours (non-standard times)         11/19/22 1246     Reason for No Pharmacological VTE Prophylaxis  Once        Question:  Reasons:  Answer:  Already adequately anticoagulated on oral Anticoagulants     11/17/22 1959     IP VTE HIGH RISK PATIENT  Once         11/17/22 1959     Place sequential compression device  Until discontinued         11/17/22 1959                Discharge Planning   TOM:      Code Status: Full Code   Is the patient medically ready for discharge?:     Reason for patient still in hospital (select all that apply): Patient trending condition  Discharge Plan A: Home                  Christopher Menendez DO  Department of Hospital Medicine   [facility]

## 2022-11-19 NOTE — SUBJECTIVE & OBJECTIVE
Past Medical History:   Diagnosis Date    Benign paroxysmal vertigo     Cardiomyopathy     Diabetes     Hypertension     Pulmonary HTN     Stroke     affected balance     Takotsubo cardiomyopathy     Valvular regurgitation        Past Surgical History:   Procedure Laterality Date    CATARACT EXTRACTION W/ INTRAOCULAR LENS  IMPLANT, BILATERAL      EYE SURGERY      HYSTERECTOMY         Review of patient's allergies indicates:   Allergen Reactions    Pravastatin Rash       No current facility-administered medications on file prior to encounter.     Current Outpatient Medications on File Prior to Encounter   Medication Sig    clindamycin (CLEOCIN T) 1 % lotion APPLY TOPICALLY TWICE DAILY TO THE AFFECTED AREA(S) OF BOTH ARMPITS    clopidogreL (PLAVIX) 75 mg tablet Take 1 tablet (75 mg total) by mouth once daily.    ezetimibe (ZETIA) 10 mg tablet Take 1 tablet (10 mg total) by mouth once daily.    hydrALAZINE (APRESOLINE) 25 MG tablet Take 1 tablet (25 mg total) by mouth 2 (two) times daily.    lisinopriL-hydrochlorothiazide (PRINZIDE,ZESTORETIC) 20-12.5 mg per tablet Take 1 tablet by mouth 2 (two) times a day.    metFORMIN (GLUCOPHAGE) 500 MG tablet Take 2 tablets (1,000 mg total) by mouth 2 (two) times daily with meals. Take 2 tabs twice daily    metoprolol tartrate (LOPRESSOR) 100 MG tablet Take 1 tablet (100 mg total) by mouth 2 (two) times daily.    paroxetine (PAXIL) 10 MG tablet Take 1 tablet (10 mg total) by mouth every morning.    pioglitazone (ACTOS) 45 MG tablet Take 1 tablet (45 mg total) by mouth once daily.    vitamin E 400 UNIT capsule Take 400 Units by mouth once daily.    [DISCONTINUED] pravastatin (PRAVACHOL) 20 MG tablet Take 1 tablet (20 mg total) by mouth once daily.     Family History       Problem Relation (Age of Onset)    Cancer Mother, Brother    Diabetes Sister    Heart disease Father, Sister          Tobacco Use    Smoking status: Former     Types: Cigarettes     Quit date: 1987     Years since  quittin.9    Smokeless tobacco: Never   Substance and Sexual Activity    Alcohol use: Never    Drug use: Never    Sexual activity: Not Currently     Review of Systems   Constitutional:  Negative for appetite change, fatigue and fever.   HENT:  Negative for congestion, hearing loss and trouble swallowing.    Respiratory:  Positive for shortness of breath. Negative for chest tightness and wheezing.    Cardiovascular:  Positive for chest pain. Negative for palpitations.   Gastrointestinal:  Positive for nausea and vomiting. Negative for abdominal pain and constipation.   Genitourinary:  Negative for difficulty urinating and dysuria.   Musculoskeletal:  Positive for gait problem. Negative for back pain and neck stiffness.   Skin:  Negative for pallor and rash.   Neurological:  Positive for weakness. Negative for dizziness, speech difficulty and headaches.   Psychiatric/Behavioral:  Negative for confusion and suicidal ideas.    Objective:     Vital Signs (Most Recent):  Temp: 98.9 °F (37.2 °C) (22 1200)  Pulse: 60 (22 1200)  Resp: 19 (22 1200)  BP: (!) 137/59 (22 1200)  SpO2: 97 % (22 1200)   Vital Signs (24h Range):  Temp:  [97.5 °F (36.4 °C)-98.9 °F (37.2 °C)] 98.9 °F (37.2 °C)  Pulse:  [57-90] 60  Resp:  [17-22] 19  SpO2:  [80 %-99 %] 97 %  BP: (135-192)/() 137/59     Weight: 59.4 kg (130 lb 15.3 oz)  Body mass index is 26.45 kg/m².    Physical Exam  Vitals reviewed.   Constitutional:       General: She is awake. She is not in acute distress.     Appearance: She is well-developed. She is not toxic-appearing.   HENT:      Head: Normocephalic.      Nose: Nose normal.      Mouth/Throat:      Pharynx: Oropharynx is clear.   Eyes:      Extraocular Movements: Extraocular movements intact.      Pupils: Pupils are equal, round, and reactive to light.   Neck:      Thyroid: No thyroid mass.      Vascular: No carotid bruit.   Cardiovascular:      Rate and Rhythm: Normal rate and regular  rhythm.      Pulses: Normal pulses.      Heart sounds: Normal heart sounds. No murmur heard.  Pulmonary:      Effort: Pulmonary effort is normal.      Breath sounds: Normal breath sounds and air entry. No wheezing.   Abdominal:      General: Bowel sounds are normal. There is no distension.      Palpations: Abdomen is soft.      Tenderness: There is no abdominal tenderness.   Musculoskeletal:         General: Normal range of motion.      Cervical back: Neck supple. No rigidity.   Skin:     General: Skin is warm.      Coloration: Skin is not jaundiced.      Findings: No lesion.   Neurological:      Mental Status: She is alert and oriented to person, place, and time.      Cranial Nerves: No cranial nerve deficit.      Comments:   RUE   4 / 5  LUE    5 /5  RLE    4 / 5  LLE    5 / 5   Psychiatric:         Attention and Perception: Attention normal.         Mood and Affect: Mood normal.         Behavior: Behavior normal. Behavior is cooperative.         Thought Content: Thought content normal.         Cognition and Memory: Cognition normal.         CRANIAL NERVES     CN III, IV, VI   Pupils are equal, round, and reactive to light.     Significant Labs: All pertinent labs within the past 24 hours have been reviewed.  BMP:     Significant Imaging: I have reviewed all pertinent imaging results/findings within the past 24 hours.    Imaging Results              X-Ray Chest AP Portable (Final result)  Result time 11/17/22 12:41:39      Final result by Haroon Arana DO (11/17/22 12:41:39)                   Impression:      No acute pulmonary disease      Electronically signed by: Haroon Arana  Date:    11/17/2022  Time:    12:41               Narrative:    EXAMINATION:  XR CHEST AP PORTABLE    CLINICAL HISTORY:  Chest pain, unspecified    TECHNIQUE:  XR CHEST AP PORTABLE    COMPARISON:  2021    FINDINGS:  No lines or tubes.    Lungs are clear.    Normal pleura.    Cardiac silhouette is similar to comparison  exam.    No obvious acute bone findings.                                    Intake/Output - Last 3 Shifts         11/17 0700  11/18 0659 11/18 0700  11/19 0659 11/19 0700  11/20 0659    IV Piggyback 1000      Total Intake(mL/kg) 1000 (16.8)      Net +1000             Urine Occurrence 1 x 2 x           Microbiology Results (last 7 days)       ** No results found for the last 168 hours. **          Lab Results   Component Value Date    WBC 9.22 11/19/2022    HGB 11.8 (L) 11/19/2022    HCT 33.7 (L) 11/19/2022    MCV 93.6 11/19/2022     11/19/2022        CMP  Sodium   Date Value Ref Range Status   11/19/2022 134 (L) 136 - 145 mmol/L Final     Potassium   Date Value Ref Range Status   11/19/2022 3.1 (L) 3.5 - 5.1 mmol/L Final     Chloride   Date Value Ref Range Status   11/19/2022 101 98 - 107 mmol/L Final     CO2   Date Value Ref Range Status   11/19/2022 26 21 - 32 mmol/L Final     Glucose   Date Value Ref Range Status   11/19/2022 236 (H) 74 - 106 mg/dL Final     BUN   Date Value Ref Range Status   11/19/2022 18 7 - 18 mg/dL Final     Creatinine   Date Value Ref Range Status   11/19/2022 1.12 (H) 0.55 - 1.02 mg/dL Final     Calcium   Date Value Ref Range Status   11/19/2022 8.4 (L) 8.5 - 10.1 mg/dL Final     Total Protein   Date Value Ref Range Status   11/19/2022 6.5 6.4 - 8.2 g/dL Final     Albumin   Date Value Ref Range Status   11/19/2022 3.2 (L) 3.5 - 5.0 g/dL Final     Bilirubin, Total   Date Value Ref Range Status   11/19/2022 0.8 >0.0 - 1.2 mg/dL Final     Alk Phos   Date Value Ref Range Status   11/19/2022 75 55 - 142 U/L Final     AST   Date Value Ref Range Status   11/19/2022 43 (H) 15 - 37 U/L Final     ALT   Date Value Ref Range Status   11/19/2022 24 13 - 56 U/L Final     Anion Gap   Date Value Ref Range Status   11/19/2022 10 7 - 16 mmol/L Final     eGFR   Date Value Ref Range Status   06/22/2022 54 (L) >=60 mL/min/1.73m² Final      BMP  Lab Results   Component Value Date     (L) 11/19/2022     K 3.1 (L) 11/19/2022     11/19/2022    CO2 26 11/19/2022    BUN 18 11/19/2022    CREATININE 1.12 (H) 11/19/2022    CALCIUM 8.4 (L) 11/19/2022    ANIONGAP 10 11/19/2022    EGFRNONAA 54 (L) 06/22/2022

## 2022-11-19 NOTE — SUBJECTIVE & OBJECTIVE
Interval History: ***    Review of Systems   Constitutional: Negative for diaphoresis, malaise/fatigue, night sweats and weight gain.   HENT:  Negative for congestion, ear pain, hearing loss, nosebleeds and sore throat.    Eyes:  Negative for blurred vision, double vision, pain, photophobia and visual disturbance.   Cardiovascular:  Positive for chest pain, dyspnea on exertion and palpitations. Negative for claudication, irregular heartbeat, leg swelling, near-syncope, orthopnea and syncope.   Respiratory:  Negative for cough, shortness of breath, sleep disturbances due to breathing, snoring and wheezing.    Endocrine: Negative for cold intolerance, heat intolerance, polydipsia, polyphagia and polyuria.   Hematologic/Lymphatic: Negative for bleeding problem. Does not bruise/bleed easily.   Skin:  Negative for dry skin, flushing, itching, rash and skin cancer.   Musculoskeletal:  Negative for arthritis, back pain, falls, joint pain, muscle cramps, muscle weakness and myalgias.   Gastrointestinal:  Positive for abdominal pain. Negative for change in bowel habit, constipation, diarrhea, dysphagia, heartburn, nausea and vomiting.        Mild tenderness right groin with activity   Genitourinary:  Negative for bladder incontinence, dysuria, flank pain, frequency and nocturia.   Neurological:  Negative for dizziness, focal weakness, headaches, light-headedness, loss of balance, numbness, paresthesias and seizures.   Psychiatric/Behavioral:  Negative for depression, memory loss and substance abuse. The patient is not nervous/anxious.    Allergic/Immunologic: Negative for environmental allergies.   Objective:     Vital Signs (Most Recent):  Temp: 97.5 °F (36.4 °C) (11/19/22 0700)  Pulse: 68 (11/19/22 0700)  Resp: 19 (11/19/22 0700)  BP: (!) 153/66 (11/19/22 0700)  SpO2: (!) 90 % (11/19/22 0700)   Vital Signs (24h Range):  Temp:  [97.5 °F (36.4 °C)-98.6 °F (37 °C)] 97.5 °F (36.4 °C)  Pulse:  [57-90] 68  Resp:  [17-22]  19  SpO2:  [80 %-99 %] 90 %  BP: (135-192)/() 153/66     Weight: 59.4 kg (130 lb 15.3 oz)  Body mass index is 26.45 kg/m².     SpO2: (!) 90 %  O2 Device (Oxygen Therapy): nasal cannula    No intake or output data in the 24 hours ending 11/19/22 1140    Lines/Drains/Airways       Peripheral Intravenous Line  Duration                  Peripheral IV - Single Lumen 11/17/22 2130 22 G Anterior;Left Wrist 1 day                    Physical Exam  Vitals and nursing note reviewed.   Constitutional:       Appearance: Normal appearance. She is normal weight.   HENT:      Head: Normocephalic and atraumatic.      Right Ear: External ear normal.      Left Ear: External ear normal.   Eyes:      General: No scleral icterus.        Right eye: No discharge.         Left eye: No discharge.      Extraocular Movements: Extraocular movements intact.      Conjunctiva/sclera: Conjunctivae normal.      Pupils: Pupils are equal, round, and reactive to light.   Cardiovascular:      Rate and Rhythm: Normal rate and regular rhythm.      Pulses: Normal pulses.      Heart sounds: Normal heart sounds. No murmur heard.    No friction rub. No gallop.   Pulmonary:      Effort: Pulmonary effort is normal.      Breath sounds: Normal breath sounds. No wheezing, rhonchi or rales.   Chest:      Chest wall: No tenderness.   Abdominal:      General: Abdomen is flat. Bowel sounds are normal. There is no distension.      Palpations: Abdomen is soft.      Tenderness: There is no abdominal tenderness. There is no guarding or rebound.   Musculoskeletal:         General: No swelling or tenderness. Normal range of motion.      Cervical back: Normal range of motion and neck supple.   Skin:     General: Skin is warm and dry.      Findings: No erythema or rash.   Neurological:      General: No focal deficit present.      Mental Status: She is alert and oriented to person, place, and time.      Cranial Nerves: No cranial nerve deficit.      Motor: No weakness.       Gait: Gait normal.   Psychiatric:         Mood and Affect: Mood normal.         Behavior: Behavior normal.         Thought Content: Thought content normal.         Judgment: Judgment normal.       Significant Labs: {Labs:73033}    Significant Imaging: {Imagin}

## 2022-11-19 NOTE — PLAN OF CARE
"  Problem: Physical Therapy  Goal: Physical Therapy Goal  Description: To facilitate return to home situation patient will demo:  1. Mod I transfers  2. Mod I gait with no LOB    Long term goal  Patient will return to home situation and prior ADLs  Outcome: Adequate for Care Transition     Patient able to demonstrate safe mobility today but reports history of falls. Patient uninterested in PT advise/education or in having HHPT evaluate home situation. Patient also refusing recommended cardiac interventions and states "I am going home. If it is my time, God will just take me." Ideally pt would move in with her son or into an assistive living situation but patient rejects this idea. No further PT indicated  "

## 2022-11-19 NOTE — ASSESSMENT & PLAN NOTE
Patient with new onset atrial fibrillation which is controlled currently with Beta Blocker. Patient is currently in atrial fibrillation.[id number] Score: 7. HASBLED Score: 3 ( moderate risk of bleeding in range of 3.7-6% per year). Anticoagulation indicated. Anticoagulation done with heparin.    Currently rate controlled.

## 2022-11-19 NOTE — PT/OT/SLP EVAL
"Physical Therapy Evaluation and Discharge Note    Patient Name:  Erlinda Cuevas   MRN:  [id number]    Recommendations:     Discharge Recommendations:  home   Discharge Equipment Recommendations: cane, straight   Barriers to discharge:  patient refusing recommended cardiac procedures and plans to d/c home    Assessment:     Erlinda Cuevas is a 77 y.o. female admitted with a medical diagnosis of Atrial fibrillation with RVR. .  At this time, patient is functioning at their prior level of function and does not require further acute PT services.     Recent Surgery: Procedure(s) (LRB):  Angiogram, Coronary, with Left Heart Cath (N/A) Day of Surgery    Plan:     During this hospitalization, patient does not require further acute PT services.  Please re-consult if situation changes.      Subjective     Chief Complaint: A-fib with RVR; Troponin >9,000  Patient/Family Comments/goals: "I am just going home. If it is my time, God can just take me. I am not going to let them do any cardiac procedures on me."  Pain/Comfort:  Pain Rating 1: 0/10  Pain Rating Post-Intervention 1: 0/10    Patients cultural, spiritual, Anabaptism conflicts given the current situation: no    Living Environment:  Patient lives alone in a single story home with 2 steps/rail to enter  Prior to admission, patients level of function was independent with occasional use of rollator. Patient reports LE "shaking and buckling" causing falls- last 2 months ago.  Equipment used at home: rollator- occasionally.  DME owned (not currently used): none.  Upon discharge, patient will have assistance from friends/family. Patient adamantly refused to consider HHPT.     Objective:     Communicated with Rhoda Irby RN prior to session.  Patient found supine with peripheral IV, telemetry upon PT entry to room.    General Precautions: Standard, fall   Orthopedic Precautions:N/A   Braces: N/A   Respiratory Status: Room air    Exams:  Cognitive Exam:  " Patient is oriented to Person, Place, Time, and questionable understanding of medical problems and severity of same  Sensation:    -       Intact  RLE ROM: WFL  RLE Strength: WFL  LLE ROM: WFL  LLE Strength: WFL    Functional Mobility:  Bed Mobility:     Supine to Sit: modified independence  Transfers:     Sit to Stand:  modified independence with no AD  Bed to Chair: modified independence with  no AD  using  Step Transfer  Gait: 40' pushing IV pole for increased confidence/stability, SBA    AM-PAC 6 CLICK MOBILITY  Total Score:23       Treatment and Education:   Patient encouraged to at the very least set up an am/pm calling or test communication with family/friends. Multiple ideas to increase safety presented but pt with counter arguments for all.    AM-PAC 6 CLICK MOBILITY  Total Score:23     Patient left up in chair with all lines intact, call button in reach, and Rhoda Irby RN notified.    GOALS:   Multidisciplinary Problems       Physical Therapy Goals          Problem: Physical Therapy    Goal Priority Disciplines Outcome Goal Variances Interventions   Physical Therapy Goal     PT, PT/OT Adequate for Care Transition     Description: To facilitate return to home situation patient will demo:  1. Mod I transfers  2. Mod I gait with no LOB    Long term goal  Patient will return to home situation and prior ADLs                       History:     Past Medical History:   Diagnosis Date    Benign paroxysmal vertigo     Cardiomyopathy     Diabetes     Hypertension     Pulmonary HTN     Stroke     affected balance     Takotsubo cardiomyopathy     Valvular regurgitation        Past Surgical History:   Procedure Laterality Date    CATARACT EXTRACTION W/ INTRAOCULAR LENS  IMPLANT, BILATERAL      EYE SURGERY      HYSTERECTOMY         Time Tracking:     PT Received On: 11/18/22  PT Start Time: 0957     PT Stop Time: 1015  PT Total Time (min): 18 min     Billable Minutes: Evaluation Low complexity      11/18/2022

## 2022-11-19 NOTE — NURSING
1850 Pt complained of SOB and chest pain O2 sat 80% on room air, R 22, /99, HR 90, pt sweating. Pt placed on 15L non-rebreather mask, O2 improved to 93% Notified Dr. Sutton at 1900 and asked to come evaluate patient. Pt given 1 nitro at 1910 and chest pain improved but still complaining of SOB. Pt evaluated by respiratory and left on 15L non-rebreather, O2 sat 94%. Dr. Sutton evaluated pt at 1930. No new orders at this time. Will continue to monitor.

## 2022-11-19 NOTE — ASSESSMENT & PLAN NOTE
Reviewed cath films with CTS, (Dr. Brown [facility]), do not feel pt is a surgical candidate, LAD and RCA  too small to graft, discussed options with pt.  Recommend PCI with DILMA Cx and possibly LAD on Monday, pt elects to proceed.  Will start DAPT with Brillinta.

## 2022-11-20 LAB
GLUCOSE SERPL-MCNC: 163 MG/DL (ref 70–105)
GLUCOSE SERPL-MCNC: 221 MG/DL (ref 70–105)
GLUCOSE SERPL-MCNC: 232 MG/DL (ref 70–105)
GLUCOSE SERPL-MCNC: 271 MG/DL (ref 70–105)

## 2022-11-20 PROCEDURE — 25000003 PHARM REV CODE 250: Performed by: INTERNAL MEDICINE

## 2022-11-20 PROCEDURE — 99232 SBSQ HOSP IP/OBS MODERATE 35: CPT | Mod: ,,, | Performed by: FAMILY MEDICINE

## 2022-11-20 PROCEDURE — 11000001 HC ACUTE MED/SURG PRIVATE ROOM

## 2022-11-20 PROCEDURE — 99232 PR SUBSEQUENT HOSPITAL CARE,LEVL II: ICD-10-PCS | Mod: ,,, | Performed by: FAMILY MEDICINE

## 2022-11-20 PROCEDURE — 63600175 PHARM REV CODE 636 W HCPCS: Performed by: INTERNAL MEDICINE

## 2022-11-20 PROCEDURE — 25000003 PHARM REV CODE 250: Performed by: HOSPITALIST

## 2022-11-20 PROCEDURE — 63600175 PHARM REV CODE 636 W HCPCS: Performed by: HOSPITALIST

## 2022-11-20 PROCEDURE — 99233 PR SUBSEQUENT HOSPITAL CARE,LEVL III: ICD-10-PCS | Mod: ,,, | Performed by: INTERNAL MEDICINE

## 2022-11-20 PROCEDURE — 25000003 PHARM REV CODE 250: Performed by: NURSE PRACTITIONER

## 2022-11-20 PROCEDURE — 94761 N-INVAS EAR/PLS OXIMETRY MLT: CPT

## 2022-11-20 PROCEDURE — 82962 GLUCOSE BLOOD TEST: CPT

## 2022-11-20 PROCEDURE — 99233 SBSQ HOSP IP/OBS HIGH 50: CPT | Mod: ,,, | Performed by: INTERNAL MEDICINE

## 2022-11-20 PROCEDURE — 25000003 PHARM REV CODE 250: Performed by: FAMILY MEDICINE

## 2022-11-20 RX ADMIN — INSULIN ASPART 2 UNITS: 100 INJECTION, SOLUTION INTRAVENOUS; SUBCUTANEOUS at 09:11

## 2022-11-20 RX ADMIN — ISOSORBIDE MONONITRATE 30 MG: 30 TABLET, EXTENDED RELEASE ORAL at 08:11

## 2022-11-20 RX ADMIN — ENOXAPARIN SODIUM 60 MG: 60 INJECTION SUBCUTANEOUS at 01:11

## 2022-11-20 RX ADMIN — METOPROLOL TARTRATE 100 MG: 50 TABLET, FILM COATED ORAL at 08:11

## 2022-11-20 RX ADMIN — LISINOPRIL 20 MG: 20 TABLET ORAL at 08:11

## 2022-11-20 RX ADMIN — CLOPIDOGREL BISULFATE 75 MG: 75 TABLET ORAL at 08:11

## 2022-11-20 RX ADMIN — INSULIN ASPART 2 UNITS: 100 INJECTION, SOLUTION INTRAVENOUS; SUBCUTANEOUS at 06:11

## 2022-11-20 RX ADMIN — HYDROCHLOROTHIAZIDE 12.5 MG: 12.5 TABLET ORAL at 08:11

## 2022-11-20 RX ADMIN — PAROXETINE HYDROCHLORIDE 10 MG: 10 TABLET, FILM COATED ORAL at 06:11

## 2022-11-20 RX ADMIN — POTASSIUM CHLORIDE 20 MEQ: 20 TABLET, EXTENDED RELEASE ORAL at 08:11

## 2022-11-20 RX ADMIN — HYDRALAZINE HYDROCHLORIDE 25 MG: 25 TABLET ORAL at 09:11

## 2022-11-20 RX ADMIN — INSULIN ASPART 4 UNITS: 100 INJECTION, SOLUTION INTRAVENOUS; SUBCUTANEOUS at 12:11

## 2022-11-20 RX ADMIN — INSULIN ASPART 6 UNITS: 100 INJECTION, SOLUTION INTRAVENOUS; SUBCUTANEOUS at 04:11

## 2022-11-20 RX ADMIN — ENOXAPARIN SODIUM 60 MG: 60 INJECTION SUBCUTANEOUS at 12:11

## 2022-11-20 RX ADMIN — DILTIAZEM HYDROCHLORIDE 30 MG: 30 TABLET, FILM COATED ORAL at 09:11

## 2022-11-20 NOTE — SUBJECTIVE & OBJECTIVE
Past Medical History:   Diagnosis Date    Benign paroxysmal vertigo     Cardiomyopathy     Diabetes     Hypertension     Pulmonary HTN     Stroke     affected balance     Takotsubo cardiomyopathy     Valvular regurgitation        Past Surgical History:   Procedure Laterality Date    CATARACT EXTRACTION W/ INTRAOCULAR LENS  IMPLANT, BILATERAL      EYE SURGERY      HYSTERECTOMY         Review of patient's allergies indicates:   Allergen Reactions    Pravastatin Rash       No current facility-administered medications on file prior to encounter.     Current Outpatient Medications on File Prior to Encounter   Medication Sig    clindamycin (CLEOCIN T) 1 % lotion APPLY TOPICALLY TWICE DAILY TO THE AFFECTED AREA(S) OF BOTH ARMPITS    clopidogreL (PLAVIX) 75 mg tablet Take 1 tablet (75 mg total) by mouth once daily.    ezetimibe (ZETIA) 10 mg tablet Take 1 tablet (10 mg total) by mouth once daily.    hydrALAZINE (APRESOLINE) 25 MG tablet Take 1 tablet (25 mg total) by mouth 2 (two) times daily.    lisinopriL-hydrochlorothiazide (PRINZIDE,ZESTORETIC) 20-12.5 mg per tablet Take 1 tablet by mouth 2 (two) times a day.    metFORMIN (GLUCOPHAGE) 500 MG tablet Take 2 tablets (1,000 mg total) by mouth 2 (two) times daily with meals. Take 2 tabs twice daily    metoprolol tartrate (LOPRESSOR) 100 MG tablet Take 1 tablet (100 mg total) by mouth 2 (two) times daily.    paroxetine (PAXIL) 10 MG tablet Take 1 tablet (10 mg total) by mouth every morning.    pioglitazone (ACTOS) 45 MG tablet Take 1 tablet (45 mg total) by mouth once daily.    vitamin E 400 UNIT capsule Take 400 Units by mouth once daily.    [DISCONTINUED] pravastatin (PRAVACHOL) 20 MG tablet Take 1 tablet (20 mg total) by mouth once daily.     Family History       Problem Relation (Age of Onset)    Cancer Mother, Brother    Diabetes Sister    Heart disease Father, Sister          Tobacco Use    Smoking status: Former     Types: Cigarettes     Quit date: 1987     Years since  quittin.9    Smokeless tobacco: Never   Substance and Sexual Activity    Alcohol use: Never    Drug use: Never    Sexual activity: Not Currently     Review of Systems   Constitutional:  Negative for appetite change, fatigue and fever.   HENT:  Negative for congestion, hearing loss and trouble swallowing.    Respiratory:  Positive for shortness of breath. Negative for chest tightness and wheezing.    Cardiovascular:  Positive for chest pain. Negative for palpitations.   Gastrointestinal:  Positive for nausea and vomiting. Negative for abdominal pain and constipation.   Genitourinary:  Negative for difficulty urinating and dysuria.   Musculoskeletal:  Positive for gait problem. Negative for back pain and neck stiffness.   Skin:  Negative for pallor and rash.   Neurological:  Positive for weakness. Negative for dizziness, speech difficulty and headaches.   Psychiatric/Behavioral:  Negative for confusion and suicidal ideas.    Objective:     Vital Signs (Most Recent):  Temp: 97.7 °F (36.5 °C) (22 1200)  Pulse: 62 (22 1200)  Resp: 19 (22 1200)  BP: (!) 137/57 (22 1200)  SpO2: 96 % (22 1200)   Vital Signs (24h Range):  Temp:  [97.5 °F (36.4 °C)-98.8 °F (37.1 °C)] 97.7 °F (36.5 °C)  Pulse:  [51-69] 62  Resp:  [19] 19  SpO2:  [96 %-99 %] 96 %  BP: (135-144)/(56-66) 137/57     Weight: 59.4 kg (130 lb 15.3 oz)  Body mass index is 26.45 kg/m².    Physical Exam  Vitals reviewed.   Constitutional:       General: She is awake. She is not in acute distress.     Appearance: She is well-developed. She is not toxic-appearing.   HENT:      Head: Normocephalic.      Nose: Nose normal.      Mouth/Throat:      Pharynx: Oropharynx is clear.   Eyes:      Extraocular Movements: Extraocular movements intact.      Pupils: Pupils are equal, round, and reactive to light.   Neck:      Thyroid: No thyroid mass.      Vascular: No carotid bruit.   Cardiovascular:      Rate and Rhythm: Normal rate and regular  rhythm.      Pulses: Normal pulses.      Heart sounds: Normal heart sounds. No murmur heard.  Pulmonary:      Effort: Pulmonary effort is normal.      Breath sounds: Normal breath sounds and air entry. No wheezing.   Abdominal:      General: Bowel sounds are normal. There is no distension.      Palpations: Abdomen is soft.      Tenderness: There is no abdominal tenderness.   Musculoskeletal:         General: Normal range of motion.      Cervical back: Neck supple. No rigidity.   Skin:     General: Skin is warm.      Coloration: Skin is not jaundiced.      Findings: No lesion.   Neurological:      Mental Status: She is alert and oriented to person, place, and time.      Cranial Nerves: No cranial nerve deficit.      Comments:   RUE   4 / 5  LUE    5 /5  RLE    4 / 5  LLE    5 / 5   Psychiatric:         Attention and Perception: Attention normal.         Mood and Affect: Mood normal.         Behavior: Behavior normal. Behavior is cooperative.         Thought Content: Thought content normal.         Cognition and Memory: Cognition normal.         CRANIAL NERVES     CN III, IV, VI   Pupils are equal, round, and reactive to light.     Significant Labs: All pertinent labs within the past 24 hours have been reviewed.  BMP:     Significant Imaging: I have reviewed all pertinent imaging results/findings within the past 24 hours.    Imaging Results              X-Ray Chest AP Portable (Final result)  Result time 11/17/22 12:41:39      Final result by Haroon Arana DO (11/17/22 12:41:39)                   Impression:      No acute pulmonary disease      Electronically signed by: Haroon Arana  Date:    11/17/2022  Time:    12:41               Narrative:    EXAMINATION:  XR CHEST AP PORTABLE    CLINICAL HISTORY:  Chest pain, unspecified    TECHNIQUE:  XR CHEST AP PORTABLE    COMPARISON:  2021    FINDINGS:  No lines or tubes.    Lungs are clear.    Normal pleura.    Cardiac silhouette is similar to comparison  exam.    No obvious acute bone findings.                                    Intake/Output - Last 3 Shifts         11/18 0700  11/19 0659 11/19 0700 11/20 0659 11/20 0700  11/21 0659    P.O.   240    IV Piggyback       Total Intake(mL/kg)   240 (4)    Net   +240           Urine Occurrence 2 x  1 x          Microbiology Results (last 7 days)       ** No results found for the last 168 hours. **          Lab Results   Component Value Date    WBC 9.22 11/19/2022    HGB 11.8 (L) 11/19/2022    HCT 33.7 (L) 11/19/2022    MCV 93.6 11/19/2022     11/19/2022        CMP  Sodium   Date Value Ref Range Status   11/19/2022 134 (L) 136 - 145 mmol/L Final     Potassium   Date Value Ref Range Status   11/19/2022 3.1 (L) 3.5 - 5.1 mmol/L Final     Chloride   Date Value Ref Range Status   11/19/2022 101 98 - 107 mmol/L Final     CO2   Date Value Ref Range Status   11/19/2022 26 21 - 32 mmol/L Final     Glucose   Date Value Ref Range Status   11/19/2022 236 (H) 74 - 106 mg/dL Final     BUN   Date Value Ref Range Status   11/19/2022 18 7 - 18 mg/dL Final     Creatinine   Date Value Ref Range Status   11/19/2022 1.12 (H) 0.55 - 1.02 mg/dL Final     Calcium   Date Value Ref Range Status   11/19/2022 8.4 (L) 8.5 - 10.1 mg/dL Final     Total Protein   Date Value Ref Range Status   11/19/2022 6.5 6.4 - 8.2 g/dL Final     Albumin   Date Value Ref Range Status   11/19/2022 3.2 (L) 3.5 - 5.0 g/dL Final     Bilirubin, Total   Date Value Ref Range Status   11/19/2022 0.8 >0.0 - 1.2 mg/dL Final     Alk Phos   Date Value Ref Range Status   11/19/2022 75 55 - 142 U/L Final     AST   Date Value Ref Range Status   11/19/2022 43 (H) 15 - 37 U/L Final     ALT   Date Value Ref Range Status   11/19/2022 24 13 - 56 U/L Final     Anion Gap   Date Value Ref Range Status   11/19/2022 10 7 - 16 mmol/L Final     eGFR   Date Value Ref Range Status   06/22/2022 54 (L) >=60 mL/min/1.73m² Final      BMP  Lab Results   Component Value Date     (L)  11/19/2022    K 3.1 (L) 11/19/2022     11/19/2022    CO2 26 11/19/2022    BUN 18 11/19/2022    CREATININE 1.12 (H) 11/19/2022    CALCIUM 8.4 (L) 11/19/2022    ANIONGAP 10 11/19/2022    EGFRNONAA 54 (L) 06/22/2022

## 2022-11-20 NOTE — PROGRESS NOTES
"[facility] - [address] [place] [address] Medicine  Progress Note    Patient Name: Erlinda Cuevas  MRN: [id number]  Patient Class: IP- Inpatient   Admission Date: 11/17/2022  Length of Stay: 3 days  Attending Physician: Christopher Menendez DO  Primary Care Provider: Rigoberto Espinosa III, DO        Subjective:     Principal Problem:Atrial fibrillation with RVR        HPI:  Chief complaint:  Chest pain    History of present illness:    Ms. Cuevas is 77-year-old presented to the emergency room with on and off chest pain over the last 2 weeks.  Has occurred with rest but also with exertion.  Symptoms associated with shortness of breaths.  Worse episode earlier day of admission associated with some diaphoresis and nausea vomiting x1.  When seen in the emergency room was noted to have atrial fibrillation with rate 120.  No prior history of atrial fibrillation.  Since heart rates been more controlled in the 60s.  Was noted have elevated troponin.  Patient is followed by Dr. Nuñez for nonischemic cardiomyopathy.  States told in the past she had "broken heart with normal coronary arteries.     Review of systems:  See above.  Patient gets occasional jerking movement that causes her to fall.  She is been evaluated in the past in Neurology Clinic by Diego Benitez NP.  States has a Rollator but does not use it often.  Four weeks ago had syncopal like episode where she fell and thinks she might have been unconscious just for a few seconds.  No tongue biting or incontinence.  No chest pain or palpitation.  States Dr. Espinosa adjust raised her blood pressure medicine up and she thinks he gave her too much.   Review systems otherwise negative.    Past medical history:  -essential hypertension times 20 years.  -diabetes mellitus type 2 diagnosed in 1999 controlled with oral agents.  -cerebrovascular accident x2.  States with 1st stroke lost partial peripheral vision to left eye.  Second stroke caused " weakness to right side left with weakness to left upper extremity and some paresthesias there.    Past surgical history:  Removed floater from right eye  Bilateral cataract surgery   section    Social history:  Lives alone  States   2 years earlier  Son lives in [place]  States has neighbors that she can call if needs help  No history of tobacco alcohol use    Family history:  No one in family premature coronary artery disease.    Health maintenance issues:  Has seen Dr. ROCHA in past but plans on getting a new doctor and was prior recommended to see Dr. Hurd.  Does not take flu shots Pneumovax or any prior COVID vaccination  Does not do Pap smears, mammograms or colonoscopies and understands these are important cancer screening studies but not does not plan to have done.             Overview/Hospital Course:   - no additional chest pain.  No shortness of breath.  Main complaint is wanting something to eat.  Markedly elevated troponin.  Cardiology to see.  Await studies.    :  Patient was seen examined sitting comfortably on the edge of the bed in no acute distress with no acute events overnight.  The patient denies any chest pain or diaphoresis last night.  She denies shortness of breath and has not been wearing her oxygen whole time.  Cardiology discussed possibilities of CABG with Cardiothoracic surgery and they have decided to forego that route.  Cardiology plans on left heart catheterization in the a.m..  Repeat labs in a.m..    Past Medical History:   Diagnosis Date    Benign paroxysmal vertigo     Cardiomyopathy     Diabetes     Hypertension     Pulmonary HTN     Stroke     affected balance     Takotsubo cardiomyopathy     Valvular regurgitation        Past Surgical History:   Procedure Laterality Date    CATARACT EXTRACTION W/ INTRAOCULAR LENS  IMPLANT, BILATERAL      EYE SURGERY      HYSTERECTOMY         Review of patient's allergies indicates:   Allergen Reactions     Pravastatin Rash       No current facility-administered medications on file prior to encounter.     Current Outpatient Medications on File Prior to Encounter   Medication Sig    clindamycin (CLEOCIN T) 1 % lotion APPLY TOPICALLY TWICE DAILY TO THE AFFECTED AREA(S) OF BOTH ARMPITS    clopidogreL (PLAVIX) 75 mg tablet Take 1 tablet (75 mg total) by mouth once daily.    ezetimibe (ZETIA) 10 mg tablet Take 1 tablet (10 mg total) by mouth once daily.    hydrALAZINE (APRESOLINE) 25 MG tablet Take 1 tablet (25 mg total) by mouth 2 (two) times daily.    lisinopriL-hydrochlorothiazide (PRINZIDE,ZESTORETIC) 20-12.5 mg per tablet Take 1 tablet by mouth 2 (two) times a day.    metFORMIN (GLUCOPHAGE) 500 MG tablet Take 2 tablets (1,000 mg total) by mouth 2 (two) times daily with meals. Take 2 tabs twice daily    metoprolol tartrate (LOPRESSOR) 100 MG tablet Take 1 tablet (100 mg total) by mouth 2 (two) times daily.    paroxetine (PAXIL) 10 MG tablet Take 1 tablet (10 mg total) by mouth every morning.    pioglitazone (ACTOS) 45 MG tablet Take 1 tablet (45 mg total) by mouth once daily.    vitamin E 400 UNIT capsule Take 400 Units by mouth once daily.    [DISCONTINUED] pravastatin (PRAVACHOL) 20 MG tablet Take 1 tablet (20 mg total) by mouth once daily.     Family History       Problem Relation (Age of Onset)    Cancer Mother, Brother    Diabetes Sister    Heart disease Father, Sister          Tobacco Use    Smoking status: Former     Types: Cigarettes     Quit date:      Years since quittin.9    Smokeless tobacco: Never   Substance and Sexual Activity    Alcohol use: Never    Drug use: Never    Sexual activity: Not Currently     Review of Systems   Constitutional:  Negative for appetite change, fatigue and fever.   HENT:  Negative for congestion, hearing loss and trouble swallowing.    Respiratory:  Positive for shortness of breath. Negative for chest tightness and wheezing.    Cardiovascular:   Positive for chest pain. Negative for palpitations.   Gastrointestinal:  Positive for nausea and vomiting. Negative for abdominal pain and constipation.   Genitourinary:  Negative for difficulty urinating and dysuria.   Musculoskeletal:  Positive for gait problem. Negative for back pain and neck stiffness.   Skin:  Negative for pallor and rash.   Neurological:  Positive for weakness. Negative for dizziness, speech difficulty and headaches.   Psychiatric/Behavioral:  Negative for confusion and suicidal ideas.    Objective:     Vital Signs (Most Recent):  Temp: 97.7 °F (36.5 °C) (11/20/22 1200)  Pulse: 62 (11/20/22 1200)  Resp: 19 (11/20/22 1200)  BP: (!) 137/57 (11/20/22 1200)  SpO2: 96 % (11/20/22 1200)   Vital Signs (24h Range):  Temp:  [97.5 °F (36.4 °C)-98.8 °F (37.1 °C)] 97.7 °F (36.5 °C)  Pulse:  [51-69] 62  Resp:  [19] 19  SpO2:  [96 %-99 %] 96 %  BP: (135-144)/(56-66) 137/57     Weight: 59.4 kg (130 lb 15.3 oz)  Body mass index is 26.45 kg/m².    Physical Exam  Vitals reviewed.   Constitutional:       General: She is awake. She is not in acute distress.     Appearance: She is well-developed. She is not toxic-appearing.   HENT:      Head: Normocephalic.      Nose: Nose normal.      Mouth/Throat:      Pharynx: Oropharynx is clear.   Eyes:      Extraocular Movements: Extraocular movements intact.      Pupils: Pupils are equal, round, and reactive to light.   Neck:      Thyroid: No thyroid mass.      Vascular: No carotid bruit.   Cardiovascular:      Rate and Rhythm: Normal rate and regular rhythm.      Pulses: Normal pulses.      Heart sounds: Normal heart sounds. No murmur heard.  Pulmonary:      Effort: Pulmonary effort is normal.      Breath sounds: Normal breath sounds and air entry. No wheezing.   Abdominal:      General: Bowel sounds are normal. There is no distension.      Palpations: Abdomen is soft.      Tenderness: There is no abdominal tenderness.   Musculoskeletal:         General: Normal range of  motion.      Cervical back: Neck supple. No rigidity.   Skin:     General: Skin is warm.      Coloration: Skin is not jaundiced.      Findings: No lesion.   Neurological:      Mental Status: She is alert and oriented to person, place, and time.      Cranial Nerves: No cranial nerve deficit.      Comments:   RUE   4 / 5  LUE    5 /5  RLE    4 / 5  LLE    5 / 5   Psychiatric:         Attention and Perception: Attention normal.         Mood and Affect: Mood normal.         Behavior: Behavior normal. Behavior is cooperative.         Thought Content: Thought content normal.         Cognition and Memory: Cognition normal.         CRANIAL NERVES     CN III, IV, VI   Pupils are equal, round, and reactive to light.     Significant Labs: All pertinent labs within the past 24 hours have been reviewed.  BMP:     Significant Imaging: I have reviewed all pertinent imaging results/findings within the past 24 hours.    Imaging Results              X-Ray Chest AP Portable (Final result)  Result time 11/17/22 12:41:39      Final result by Haroon Arana DO (11/17/22 12:41:39)                   Impression:      No acute pulmonary disease      Electronically signed by: Haroon Arana  Date:    11/17/2022  Time:    12:41               Narrative:    EXAMINATION:  XR CHEST AP PORTABLE    CLINICAL HISTORY:  Chest pain, unspecified    TECHNIQUE:  XR CHEST AP PORTABLE    COMPARISON:  2021    FINDINGS:  No lines or tubes.    Lungs are clear.    Normal pleura.    Cardiac silhouette is similar to comparison exam.    No obvious acute bone findings.                                    Intake/Output - Last 3 Shifts         11/18 0700  11/19 0659 11/19 0700  11/20 0659 11/20 0700  11/21 0659    P.O.   240    IV Piggyback       Total Intake(mL/kg)   240 (4)    Net   +240           Urine Occurrence 2 x  1 x          Microbiology Results (last 7 days)       ** No results found for the last 168 hours. **          Lab Results   Component Value  Date    WBC 9.22 11/19/2022    HGB 11.8 (L) 11/19/2022    HCT 33.7 (L) 11/19/2022    MCV 93.6 11/19/2022     11/19/2022        CMP  Sodium   Date Value Ref Range Status   11/19/2022 134 (L) 136 - 145 mmol/L Final     Potassium   Date Value Ref Range Status   11/19/2022 3.1 (L) 3.5 - 5.1 mmol/L Final     Chloride   Date Value Ref Range Status   11/19/2022 101 98 - 107 mmol/L Final     CO2   Date Value Ref Range Status   11/19/2022 26 21 - 32 mmol/L Final     Glucose   Date Value Ref Range Status   11/19/2022 236 (H) 74 - 106 mg/dL Final     BUN   Date Value Ref Range Status   11/19/2022 18 7 - 18 mg/dL Final     Creatinine   Date Value Ref Range Status   11/19/2022 1.12 (H) 0.55 - 1.02 mg/dL Final     Calcium   Date Value Ref Range Status   11/19/2022 8.4 (L) 8.5 - 10.1 mg/dL Final     Total Protein   Date Value Ref Range Status   11/19/2022 6.5 6.4 - 8.2 g/dL Final     Albumin   Date Value Ref Range Status   11/19/2022 3.2 (L) 3.5 - 5.0 g/dL Final     Bilirubin, Total   Date Value Ref Range Status   11/19/2022 0.8 >0.0 - 1.2 mg/dL Final     Alk Phos   Date Value Ref Range Status   11/19/2022 75 55 - 142 U/L Final     AST   Date Value Ref Range Status   11/19/2022 43 (H) 15 - 37 U/L Final     ALT   Date Value Ref Range Status   11/19/2022 24 13 - 56 U/L Final     Anion Gap   Date Value Ref Range Status   11/19/2022 10 7 - 16 mmol/L Final     eGFR   Date Value Ref Range Status   06/22/2022 54 (L) >=60 mL/min/1.73m² Final      BMP  Lab Results   Component Value Date     (L) 11/19/2022    K 3.1 (L) 11/19/2022     11/19/2022    CO2 26 11/19/2022    BUN 18 11/19/2022    CREATININE 1.12 (H) 11/19/2022    CALCIUM 8.4 (L) 11/19/2022    ANIONGAP 10 11/19/2022    EGFRNONAA 54 (L) 06/22/2022            Assessment/Plan:      * Atrial fibrillation with RVR  Patient with new onset atrial fibrillation which is controlled currently with Beta Blocker. Patient is currently in atrial fibrillation.[id number] Score: 7.  HASBLED Score: 3 ( moderate risk of bleeding in range of 3.7-6% per year). Anticoagulation indicated. Anticoagulation done with heparin.    Currently rate controlled.        Elevated troponin  Cardiology to see  Obtain prior left heart catheterization results    11/18 markedly elevated troponin today.  Doubt PE but check D-dimer.  Echocardiogram pending    Frequent falls  Physical therapy to see      Bilateral carotid artery stenosis  Outpatient follow-up      Takotsubo cardiomyopathy  Cardiology plans on returning patient to cath lab for further stenting on Monday 11/21/2022..      Hemiparesis affecting left side as late effect of cerebrovascular accident (CVA)  Therapy team to see especially with history of recent falls.  Continue PT/OT.      Systolic hypertension    Adjust medications as needed    Type 2 diabetes mellitus, without long-term current use of insulin  Patient's FSGs are controlled on current medication regimen.  Last A1c reviewed-   Lab Results   Component Value Date    HGBA1C 7.7 (H) 06/22/2022     Most recent fingerstick glucose reviewed- No results for input(s): POCTGLUCOSE in the last 24 hours.  Current correctional scale  Medium  Maintain anti-hyperglycemic dose as follows-   Antihyperglycemics (From admission, onward)    Start     Stop Route Frequency Ordered    11/18/22 0900  pioglitazone tablet 45 mg         -- Oral Daily 11/17/22 1959 11/17/22 2054  insulin aspart U-100 injection 1-10 Units         -- SubQ Before meals & nightly PRN 11/17/22 1959        Hold Oral hypoglycemics while patient is in the hospital.      VTE Risk Mitigation (From admission, onward)         Ordered     enoxaparin injection 60 mg  Every 12 hours (non-standard times)         11/19/22 1246     Reason for No Pharmacological VTE Prophylaxis  Once        Question:  Reasons:  Answer:  Already adequately anticoagulated on oral Anticoagulants    11/17/22 1959     IP VTE HIGH RISK PATIENT  Once         11/17/22 1959      Place sequential compression device  Until discontinued         11/17/22 1959                Discharge Planning   TOM:      Code Status: Full Code   Is the patient medically ready for discharge?:     Reason for patient still in hospital (select all that apply): Patient trending condition  Discharge Plan A: Home                  Christopher Menendez DO  Department of Hospital Medicine   [facility] - [facility] Telemetry

## 2022-11-21 PROBLEM — I21.4 NSTEMI (NON-ST ELEVATED MYOCARDIAL INFARCTION): Status: ACTIVE | Noted: 2022-11-19

## 2022-11-21 LAB
ANION GAP SERPL CALCULATED.3IONS-SCNC: 12 MMOL/L (ref 7–16)
BASOPHILS # BLD AUTO: 0.04 K/UL (ref 0–0.2)
BASOPHILS NFR BLD AUTO: 0.7 % (ref 0–1)
BUN SERPL-MCNC: 17 MG/DL (ref 7–18)
BUN/CREAT SERPL: 16 (ref 6–20)
CALCIUM SERPL-MCNC: 8.6 MG/DL (ref 8.5–10.1)
CHLORIDE SERPL-SCNC: 105 MMOL/L (ref 98–107)
CO2 SERPL-SCNC: 29 MMOL/L (ref 21–32)
CREAT SERPL-MCNC: 1.04 MG/DL (ref 0.55–1.02)
DIFFERENTIAL METHOD BLD: ABNORMAL
EGFR (NO RACE VARIABLE) (RUSH/TITUS): 56 ML/MIN/1.73M²
EOSINOPHIL # BLD AUTO: 0.16 K/UL (ref 0–0.5)
EOSINOPHIL NFR BLD AUTO: 2.8 % (ref 1–4)
ERYTHROCYTE [DISTWIDTH] IN BLOOD BY AUTOMATED COUNT: 11.8 % (ref 11.5–14.5)
GLUCOSE SERPL-MCNC: 171 MG/DL (ref 74–106)
GLUCOSE SERPL-MCNC: 180 MG/DL (ref 70–105)
GLUCOSE SERPL-MCNC: 221 MG/DL (ref 70–105)
GLUCOSE SERPL-MCNC: 247 MG/DL (ref 70–105)
GLUCOSE SERPL-MCNC: 346 MG/DL (ref 70–105)
HCT VFR BLD AUTO: 34 % (ref 38–47)
HGB BLD-MCNC: 11.7 G/DL (ref 12–16)
IMM GRANULOCYTES # BLD AUTO: 0.06 K/UL (ref 0–0.04)
IMM GRANULOCYTES NFR BLD: 1.1 % (ref 0–0.4)
LYMPHOCYTES # BLD AUTO: 1.57 K/UL (ref 1–4.8)
LYMPHOCYTES NFR BLD AUTO: 27.8 % (ref 27–41)
MCH RBC QN AUTO: 32.8 PG (ref 27–31)
MCHC RBC AUTO-ENTMCNC: 34.4 G/DL (ref 32–36)
MCV RBC AUTO: 95.2 FL (ref 80–96)
MONOCYTES # BLD AUTO: 0.88 K/UL (ref 0–0.8)
MONOCYTES NFR BLD AUTO: 15.6 % (ref 2–6)
MPC BLD CALC-MCNC: 10.4 FL (ref 9.4–12.4)
NEUTROPHILS # BLD AUTO: 2.93 K/UL (ref 1.8–7.7)
NEUTROPHILS NFR BLD AUTO: 52 % (ref 53–65)
NRBC # BLD AUTO: 0 X10E3/UL
NRBC, AUTO (.00): 0 %
PLATELET # BLD AUTO: 189 K/UL (ref 150–400)
POTASSIUM SERPL-SCNC: 3.7 MMOL/L (ref 3.5–5.1)
RBC # BLD AUTO: 3.57 M/UL (ref 4.2–5.4)
SODIUM SERPL-SCNC: 142 MMOL/L (ref 136–145)
WBC # BLD AUTO: 5.64 K/UL (ref 4.5–11)

## 2022-11-21 PROCEDURE — 25000003 PHARM REV CODE 250: Performed by: FAMILY MEDICINE

## 2022-11-21 PROCEDURE — C1874 STENT, COATED/COV W/DEL SYS: HCPCS | Performed by: INTERNAL MEDICINE

## 2022-11-21 PROCEDURE — 92929 PR STENT, ADD'L VESSEL: ICD-10-PCS | Mod: LC,,, | Performed by: INTERNAL MEDICINE

## 2022-11-21 PROCEDURE — 25000003 PHARM REV CODE 250: Performed by: INTERNAL MEDICINE

## 2022-11-21 PROCEDURE — 92929 PR STENT, ADD'L VESSEL: CPT | Mod: LC,,, | Performed by: INTERNAL MEDICINE

## 2022-11-21 PROCEDURE — 99153 MOD SED SAME PHYS/QHP EA: CPT | Performed by: INTERNAL MEDICINE

## 2022-11-21 PROCEDURE — 80048 BASIC METABOLIC PNL TOTAL CA: CPT | Performed by: FAMILY MEDICINE

## 2022-11-21 PROCEDURE — 99152 MOD SED SAME PHYS/QHP 5/>YRS: CPT | Performed by: INTERNAL MEDICINE

## 2022-11-21 PROCEDURE — 63600175 PHARM REV CODE 636 W HCPCS: Performed by: HOSPITALIST

## 2022-11-21 PROCEDURE — 36415 COLL VENOUS BLD VENIPUNCTURE: CPT | Performed by: FAMILY MEDICINE

## 2022-11-21 PROCEDURE — 25500020 PHARM REV CODE 255: Performed by: INTERNAL MEDICINE

## 2022-11-21 PROCEDURE — 92928 PR STENT: ICD-10-PCS | Mod: LC,LD,, | Performed by: INTERNAL MEDICINE

## 2022-11-21 PROCEDURE — C1769 GUIDE WIRE: HCPCS | Performed by: INTERNAL MEDICINE

## 2022-11-21 PROCEDURE — 11000001 HC ACUTE MED/SURG PRIVATE ROOM

## 2022-11-21 PROCEDURE — 25000003 PHARM REV CODE 250: Performed by: HOSPITALIST

## 2022-11-21 PROCEDURE — 25000003 PHARM REV CODE 250: Performed by: STUDENT IN AN ORGANIZED HEALTH CARE EDUCATION/TRAINING PROGRAM

## 2022-11-21 PROCEDURE — C1894 INTRO/SHEATH, NON-LASER: HCPCS | Performed by: INTERNAL MEDICINE

## 2022-11-21 PROCEDURE — 99239 HOSP IP/OBS DSCHRG MGMT >30: CPT | Mod: ,,, | Performed by: NURSE PRACTITIONER

## 2022-11-21 PROCEDURE — C1887 CATHETER, GUIDING: HCPCS | Performed by: INTERNAL MEDICINE

## 2022-11-21 PROCEDURE — 92928 PRQ TCAT PLMT NTRAC ST 1 LES: CPT | Mod: LC,LD,, | Performed by: INTERNAL MEDICINE

## 2022-11-21 PROCEDURE — 25000242 PHARM REV CODE 250 ALT 637 W/ HCPCS: Performed by: INTERNAL MEDICINE

## 2022-11-21 PROCEDURE — 99239 PR HOSPITAL DISCHARGE DAY,>30 MIN: ICD-10-PCS | Mod: ,,, | Performed by: NURSE PRACTITIONER

## 2022-11-21 PROCEDURE — 63600175 PHARM REV CODE 636 W HCPCS: Performed by: INTERNAL MEDICINE

## 2022-11-21 PROCEDURE — C9601 PERC DRUG-EL COR STENT BRAN: HCPCS | Mod: LC | Performed by: INTERNAL MEDICINE

## 2022-11-21 PROCEDURE — 85025 COMPLETE CBC W/AUTO DIFF WBC: CPT | Performed by: FAMILY MEDICINE

## 2022-11-21 PROCEDURE — C1725 CATH, TRANSLUMIN NON-LASER: HCPCS | Performed by: INTERNAL MEDICINE

## 2022-11-21 PROCEDURE — 94761 N-INVAS EAR/PLS OXIMETRY MLT: CPT

## 2022-11-21 PROCEDURE — C1760 CLOSURE DEV, VASC: HCPCS | Performed by: INTERNAL MEDICINE

## 2022-11-21 PROCEDURE — C9600 PERC DRUG-EL COR STENT SING: HCPCS | Mod: LD | Performed by: INTERNAL MEDICINE

## 2022-11-21 PROCEDURE — 25000003 PHARM REV CODE 250: Performed by: NURSE PRACTITIONER

## 2022-11-21 PROCEDURE — 85347 COAGULATION TIME ACTIVATED: CPT

## 2022-11-21 DEVICE — KIT ANGIO SEAL 6FR VIP: Type: IMPLANTABLE DEVICE | Site: CORONARY | Status: FUNCTIONAL

## 2022-11-21 RX ORDER — SODIUM CHLORIDE 9 MG/ML
125 INJECTION, SOLUTION INTRAVENOUS ONCE
Status: DISCONTINUED | OUTPATIENT
Start: 2022-11-21 | End: 2022-11-21 | Stop reason: HOSPADM

## 2022-11-21 RX ORDER — MIDAZOLAM HYDROCHLORIDE 1 MG/ML
INJECTION INTRAMUSCULAR; INTRAVENOUS
Status: DISCONTINUED | OUTPATIENT
Start: 2022-11-21 | End: 2022-11-21 | Stop reason: HOSPADM

## 2022-11-21 RX ORDER — FENTANYL CITRATE 50 UG/ML
INJECTION, SOLUTION INTRAMUSCULAR; INTRAVENOUS
Status: DISCONTINUED | OUTPATIENT
Start: 2022-11-21 | End: 2022-11-21 | Stop reason: HOSPADM

## 2022-11-21 RX ORDER — SPIRONOLACTONE 25 MG/1
25 TABLET ORAL DAILY
Status: DISCONTINUED | OUTPATIENT
Start: 2022-11-21 | End: 2022-11-22 | Stop reason: HOSPADM

## 2022-11-21 RX ORDER — HEPARIN SODIUM 1000 [USP'U]/ML
INJECTION, SOLUTION INTRAVENOUS; SUBCUTANEOUS
Status: DISCONTINUED | OUTPATIENT
Start: 2022-11-21 | End: 2022-11-21 | Stop reason: HOSPADM

## 2022-11-21 RX ORDER — NITROGLYCERIN 5 MG/ML
INJECTION, SOLUTION INTRAVENOUS
Status: DISCONTINUED | OUTPATIENT
Start: 2022-11-21 | End: 2022-11-21 | Stop reason: HOSPADM

## 2022-11-21 RX ORDER — NITROGLYCERIN 0.4 MG/1
TABLET SUBLINGUAL
Status: DISCONTINUED | OUTPATIENT
Start: 2022-11-21 | End: 2022-11-21 | Stop reason: HOSPADM

## 2022-11-21 RX ORDER — LIDOCAINE HYDROCHLORIDE 10 MG/ML
INJECTION INFILTRATION; PERINEURAL
Status: DISCONTINUED | OUTPATIENT
Start: 2022-11-21 | End: 2022-11-21 | Stop reason: HOSPADM

## 2022-11-21 RX ORDER — HEPARIN SOD,PORCINE/0.9 % NACL 1000/500ML
INTRAVENOUS SOLUTION INTRAVENOUS
Status: DISCONTINUED | OUTPATIENT
Start: 2022-11-21 | End: 2022-11-21 | Stop reason: HOSPADM

## 2022-11-21 RX ORDER — SODIUM CHLORIDE 450 MG/100ML
125 INJECTION, SOLUTION INTRAVENOUS CONTINUOUS
Status: DISCONTINUED | OUTPATIENT
Start: 2022-11-21 | End: 2022-11-21 | Stop reason: HOSPADM

## 2022-11-21 RX ORDER — ACETAMINOPHEN 325 MG/1
650 TABLET ORAL EVERY 4 HOURS PRN
Status: DISCONTINUED | OUTPATIENT
Start: 2022-11-21 | End: 2022-11-22 | Stop reason: HOSPADM

## 2022-11-21 RX ORDER — ONDANSETRON 4 MG/1
8 TABLET, ORALLY DISINTEGRATING ORAL EVERY 8 HOURS PRN
Status: DISCONTINUED | OUTPATIENT
Start: 2022-11-21 | End: 2022-11-22 | Stop reason: HOSPADM

## 2022-11-21 RX ADMIN — SODIUM CHLORIDE 125 ML/HR: 4.5 INJECTION, SOLUTION INTRAVENOUS at 03:11

## 2022-11-21 RX ADMIN — PANTOPRAZOLE SODIUM 40 MG: 40 TABLET, DELAYED RELEASE ORAL at 08:11

## 2022-11-21 RX ADMIN — LISINOPRIL 20 MG: 20 TABLET ORAL at 09:11

## 2022-11-21 RX ADMIN — HYDRALAZINE HYDROCHLORIDE 25 MG: 25 TABLET ORAL at 08:11

## 2022-11-21 RX ADMIN — PAROXETINE HYDROCHLORIDE 10 MG: 10 TABLET, FILM COATED ORAL at 06:11

## 2022-11-21 RX ADMIN — LISINOPRIL 20 MG: 20 TABLET ORAL at 08:11

## 2022-11-21 RX ADMIN — INSULIN ASPART 4 UNITS: 100 INJECTION, SOLUTION INTRAVENOUS; SUBCUTANEOUS at 05:11

## 2022-11-21 RX ADMIN — ISOSORBIDE MONONITRATE 30 MG: 30 TABLET, EXTENDED RELEASE ORAL at 08:11

## 2022-11-21 RX ADMIN — CLOPIDOGREL BISULFATE 75 MG: 75 TABLET ORAL at 08:11

## 2022-11-21 RX ADMIN — INSULIN ASPART 4 UNITS: 100 INJECTION, SOLUTION INTRAVENOUS; SUBCUTANEOUS at 09:11

## 2022-11-21 RX ADMIN — EZETIMIBE 10 MG: 10 TABLET ORAL at 08:11

## 2022-11-21 RX ADMIN — POTASSIUM CHLORIDE 20 MEQ: 20 TABLET, EXTENDED RELEASE ORAL at 08:11

## 2022-11-21 RX ADMIN — DILTIAZEM HYDROCHLORIDE 30 MG: 30 TABLET, FILM COATED ORAL at 06:11

## 2022-11-21 RX ADMIN — SPIRONOLACTONE 25 MG: 25 TABLET ORAL at 11:11

## 2022-11-21 RX ADMIN — HYDRALAZINE HYDROCHLORIDE 25 MG: 25 TABLET ORAL at 09:11

## 2022-11-21 RX ADMIN — HYDROCHLOROTHIAZIDE 12.5 MG: 12.5 TABLET ORAL at 08:11

## 2022-11-21 NOTE — INTERVAL H&P NOTE
The patient has been examined and the H&P has been reviewed:    I concur with the findings and no changes have occurred since H&P was written.    Procedure risks, benefits and alternative options discussed and understood by patient/family.          Active Hospital Problems    Diagnosis  POA    *Atrial fibrillation with RVR [I48.91]  Yes    Chest pain due to myocardial ischemia [I25.9]  Yes     Chest pain this AM relieved with one sl ntg, continue on heparin until completes revascularization Monday.          Hypokalemia [E87.6]  Yes     Start potassium replacement.      Elevated troponin [R77.8]  Yes    Frequent falls [R29.6]  Not Applicable    Bilateral carotid artery stenosis [I65.23]  Yes    Takotsubo cardiomyopathy [I51.81]  Yes     EF normalized by echo 10/2017      Hemiparesis affecting left side as late effect of cerebrovascular accident (CVA) [I69.354]  Not Applicable    Type 2 diabetes mellitus, without long-term current use of insulin [E11.9]  Yes    Systolic hypertension [I10]  Yes      Resolved Hospital Problems   No resolved problems to display.

## 2022-11-21 NOTE — PROGRESS NOTES
Cardiology Progress Note        Chief Complaint: Chest pain    History of Present Illness: PT reports had one episode in which she thought she might get chest pain, reports chest started to feel tight, then resolved spontaneously.    Review of Systems:  Cardiovascular ROS: no chest pain or dyspnea on exertion  negative for - dyspnea on exertion, edema, irregular heartbeat, loss of consciousness, palpitations, or shortness of breath     Echocardiogram:   Results for orders placed during the hospital encounter of 11/17/22    Echo    Interpretation Summary  · The left ventricle is normal in size with concentric hypertrophy and moderately decreased systolic function.  · The estimated ejection fraction is 35%.  · Left ventricular diastolic dysfunction.  · Atrial fibrillation not observed.  · Normal right ventricular size.  · Mild mitral regurgitation.  · There is pulmonary hypertension.  · Mild to moderate tricuspid regurgitation.  · Mild pulmonic regurgitation.  · Normal central venous pressure (3 mmHg).  · The estimated PA systolic pressure is 47 mmHg.  · Trivial pericardial effusion.      Stress Test:  No results found for this or any previous visit.       Last catheterization:  Results for orders placed during the hospital encounter of 11/17/22    Cardiac catheterization    Conclusion    The Prox RCA lesion was 99% stenosed.    The Prox LAD lesion was 85% stenosed.    The Prox Cx to Mid Cx lesion was 70% stenosed.    The 1st Mrg lesion was 70% stenosed.    The Mid LAD lesion was 80% stenosed.    The 1st Diag lesion was 80% stenosed.    The ejection fraction was calculated to be 45%.    The pre-procedure left ventricular end diastolic pressure was 10.    The estimated blood loss was none.    There was three vessel coronary artery disease.    The procedure log was documented by Documenter: Angelica Caldwell RN and verified by Mingo Denson DO.    Date: 11/18/2022  Time: 1:39 PM        Severe triple vessel CAD,  small distal targets, will review with CTS, consider CABG, PCI if targets too small for surgical intervention.       Medications:  Current Facility-Administered Medications   Medication Dose Route Frequency Provider Last Rate Last Admin    acetaminophen tablet 650 mg  650 mg Oral Q4H PRN Mingo Denson DO        clopidogreL tablet 75 mg  75 mg Oral Daily Lonnie Deleon MD   75 mg at 11/20/22 0850    dextrose 10% bolus 125 mL  12.5 g Intravenous PRN Lonnie Deleon MD        dextrose 10% bolus 250 mL  25 g Intravenous PRN Lonnie Deleon MD        diltiaZEM tablet 30 mg  30 mg Oral Q8H MADDIE Monzon   30 mg at 11/19/22 1400    enoxaparin injection 60 mg  1 mg/kg Subcutaneous Q12H Mingo Denson DO   60 mg at 11/20/22 1242    ezetimibe tablet 10 mg  10 mg Oral Daily Lonnie Deleon MD   10 mg at 11/19/22 0931    glucagon (human recombinant) injection 1 mg  1 mg Intramuscular PRN Lonnie Deleon MD        glucose chewable tablet 16 g  16 g Oral PRN Lonnie Deleon MD        glucose chewable tablet 24 g  24 g Oral PRN Lonnie Deleon MD        hydrALAZINE tablet 25 mg  25 mg Oral BID Lonnie Deleon MD   25 mg at 11/19/22 0931    lisinopriL tablet 20 mg  20 mg Oral BID Lonnie Deleon MD   20 mg at 11/20/22 2031    And    hydroCHLOROthiazide tablet 12.5 mg  12.5 mg Oral BID Lonnie Deleon MD   12.5 mg at 11/20/22 2031    insulin aspart U-100 injection 1-10 Units  1-10 Units Subcutaneous QID (AC + HS) PRN Lonnie Deleon MD   6 Units at 11/20/22 1653    isosorbide mononitrate 24 hr tablet 30 mg  30 mg Oral Daily Mingo Denson DO   30 mg at 11/20/22 0850    metoprolol tartrate (LOPRESSOR) tablet 100 mg  100 mg Oral BID Lonnie Deleon MD   100 mg at 11/20/22 2031    naloxone 0.4 mg/mL injection 0.02 mg  0.02 mg Intravenous PRN Lonnie Deleon MD        nitroGLYCERIN SL tablet 0.4 mg  0.4 mg Sublingual Q5 Min PRN Britni Sutton MD   0.4 mg at 11/18/22 1910    ondansetron disintegrating tablet 8 mg  8 mg Oral Q8H PRN  "Mingo Denson, DO        pantoprazole EC tablet 40 mg  40 mg Oral Daily Lonnie Deleon MD   40 mg at 11/19/22 0931    paroxetine tablet 10 mg  10 mg Oral QAM Lonnie Deleon MD   10 mg at 11/20/22 0601    pioglitazone tablet 45 mg  45 mg Oral Daily Lonnie Deleon MD   45 mg at 11/19/22 0946    potassium chloride SA CR tablet 20 mEq  20 mEq Oral Daily Christopher Menendez, DO   20 mEq at 11/20/22 0849    promethazine tablet 25 mg  25 mg Oral Q6H PRN Lonnie Deleon MD        sodium chloride 0.9% flush 10 mL  10 mL Intravenous Q12H PRN Lonnie Deleon MD              Physical Exam:  VS: BP (!) 137/52   Pulse 63   Temp 98.3 °F (36.8 °C)   Resp 17   Ht 4' 11" (1.499 m)   Wt 59.4 kg (130 lb 15.3 oz)   SpO2 99%   Breastfeeding No   BMI 26.45 kg/m²   Physical Exam     Diagnostic studies reviewed up until the time of this note.     ECG and cardiac telemetry reviewed.     Assessment and Plan:  NSTEMI (non-ST elevated myocardial infarction)  -     Troponin I; Standing  -     EKG 12-lead; Standing  -     Neuro checks:  LOC, Orientation, GCS; Standing  -     Vital signs; Standing  -     Cancel: Cardiac Monitoring - Adult; Standing  -     Assess for bleeding; Standing  -     Assess neurologic status; Standing  -     Vascular access site assessment and bilateral pulses; Standing  -     Intake and output; Standing  -     Collagen closure plug/device; Standing  -     Notify Physician; Standing  -     Notify physician; Standing  -     Notify physician ; Standing  -     Notify physician; Standing  -     Notify physician ; Standing  -     Notify physician ; Standing  -     Cancel: Diet NPO Except for: Medication, Sips with Medication; Standing  -     Cancel: Diet Cardiac; Standing  -     Comprehensive metabolic panel; Standing  -     CBC auto differential; Standing  -     Case Request-Cath Lab: ANGIOGRAM, CORONARY, INCLUDING BYPASS GRAFT, WITH LEFT HEART CATHETERIZATION; Standing  -     EKG 12-lead; Future    Dyspnea  -     EKG " 12-lead; Standing    Chest pain  -     X-Ray Chest AP Portable; Standing  -     EKG 12-lead; Standing  -     Case Request-Cath Lab: Angiogram, Coronary, with Left Heart Cath; Standing  -     Cardiac catheterization; Standing  -     Case Request-Cath Lab: ANGIOGRAM, CORONARY, INCLUDING BYPASS GRAFT, WITH LEFT HEART CATHETERIZATION; Standing  -     EKG 12-lead; Future    Atrial fibrillation with RVR  -     Case Request-Cath Lab: ANGIOGRAM, CORONARY, INCLUDING BYPASS GRAFT, WITH LEFT HEART CATHETERIZATION; Standing  -     EKG 12-lead; Future    Atrial fibrillation  -     Echo; Standing  -     Case Request-Cath Lab: Angiogram, Coronary, with Left Heart Cath; Standing  -     Cardiac catheterization; Standing  -     Neuro checks:  LOC, Orientation, GCS; Standing  -     Vital signs; Standing  -     Cancel: Cardiac Monitoring - Adult; Standing  -     Assess for bleeding; Standing  -     Assess neurologic status; Standing  -     Vascular access site assessment and bilateral pulses; Standing  -     Intake and output; Standing  -     Collagen closure plug/device; Standing  -     Notify Physician; Standing  -     Notify physician; Standing  -     Notify physician ; Standing  -     Notify physician; Standing  -     Notify physician ; Standing  -     Notify physician ; Standing  -     Cancel: Diet NPO Except for: Medication, Sips with Medication; Standing  -     Cancel: Diet Cardiac; Standing  -     Comprehensive metabolic panel; Standing  -     CBC auto differential; Standing    Systolic hypertension  -     Neuro checks:  LOC, Orientation, GCS; Standing  -     Vital signs; Standing  -     Cancel: Cardiac Monitoring - Adult; Standing  -     Assess for bleeding; Standing  -     Assess neurologic status; Standing  -     Vascular access site assessment and bilateral pulses; Standing  -     Intake and output; Standing  -     Collagen closure plug/device; Standing  -     Notify Physician; Standing  -     Notify physician; Standing  -      Notify physician ; Standing  -     Notify physician; Standing  -     Notify physician ; Standing  -     Notify physician ; Standing  -     Cancel: Diet NPO Except for: Medication, Sips with Medication; Standing  -     Cancel: Diet Cardiac; Standing  -     Comprehensive metabolic panel; Standing  -     CBC auto differential; Standing    Unstable angina pectoris    Elevated troponin  -     EKG 12-lead; Future    Other orders  -     CBC auto differential; Standing  -     Comprehensive metabolic panel; Standing  -     Amylase; Standing  -     Lipase; Standing  -     Lactic acid, plasma; Standing  -     Troponin I; Standing  -     Protime-INR; Standing  -     APTT; Standing  -     Magnesium; Standing  -     Urinalysis, Reflex to Urine Culture; Standing  -     Lactic Acid, Plasma; Standing  -     sodium chloride 0.9% bolus 1,000 mL  -     Troponin I; Standing  -     Urinalysis, Microscopic; Standing  -     diltiaZEM injection 20 mg  -     Admit to Inpatient; Standing  -     clopidogreL tablet 75 mg  -     ezetimibe tablet 10 mg  -     hydrALAZINE tablet 25 mg  -     Discontinue: lisinopriL-hydrochlorothiazide 20-12.5 mg per tablet 1 tablet  -     metoprolol tartrate (LOPRESSOR) tablet 100 mg  -     paroxetine tablet 10 mg  -     pioglitazone tablet 45 mg  -     Vital Signs; Standing  -     Bladder scan; Standing  -     Notify Physician; Standing  -     sodium chloride 0.9% flush 10 mL  -     Insert saline lock; Standing  -     IP VTE HIGH RISK PATIENT; Standing  -     Place sequential compression device; Standing  -     naloxone 0.4 mg/mL injection 0.02 mg  -     glucose chewable tablet 16 g  -     glucose chewable tablet 24 g  -     glucagon (human recombinant) injection 1 mg  -     Recheck Blood Glucose:; Standing  -     dextrose 10% bolus 125 mL  -     dextrose 10% bolus 250 mL  -     Full code; Standing  -     Progressive Mobility Protocol (mobilize patient to their highest level of functioning at least twice  daily); Standing  -     Fall precautions; Standing  -     Weigh patient; Standing  -     Strict intake and output (1) Document % meals taken (2) # of urinations (3) # and consistency of BMs; Standing  -     Cancel: Diet diabetic; Standing  -     Reason for No Pharmacological VTE Prophylaxis; Standing  -     Discontinue: acetaminophen tablet 650 mg  -     Discontinue: ondansetron injection 4 mg  -     promethazine tablet 25 mg  -     insulin aspart U-100 injection 1-10 Units  -     POCT glucose; Standing  -     Cancel: PT evaluate and treat; Standing  -     Cancel: OT evaluate and treat; Standing  -     Cancel: Inpatient consult to Cardiology; Standing  -     Cancel: Diet NPO Except for: Medication, Sips with Medication; Standing  -     Cancel: Draw aPTT level 6 hours after start of infusion; adjust dosage and order aPTT based on the nomogram in hyperlink; Standing  -     Cancel: Do not administer any intramuscular injections, call physician for an alternative route or medication if medication is needed; Standing  -     Cancel: If bleeding occurs, or HIT is suspected, stop heparin infusion and contact physician; Standing  -     Cancel: APTT; Standing  -     Cancel: CBC auto differential; Standing  -     heparin 25,000 units in dextrose 5% (100 units/ml) IV bolus from bag INITIAL BOLUS  -     Discontinue: heparin 25,000 units in dextrose 5% 250 mL (100 units/mL) infusion LOW INTENSITY nomogram - [facility]  -     Discontinue: heparin 25,000 units in dextrose 5% (100 units/ml) IV bolus from bag - ADDITIONAL PRN BOLUS - 60 units/kg  -     Discontinue: heparin 25,000 units in dextrose 5% (100 units/ml) IV bolus from bag - ADDITIONAL PRN BOLUS - 30 units/kg  -     pantoprazole EC tablet 40 mg  -     Lipid Panel; Standing  -     Basic Metabolic Panel; Standing  -     Magnesium; Standing  -     Troponin I; Standing  -     lisinopriL tablet 20 mg  -     hydroCHLOROthiazide tablet 12.5 mg  -     POCT glucose; Standing  -      Cancel: APTT; Standing  -     POCT glucose; Standing  -     APTT; Standing  -     Inpatient consult to Cardiology; Standing  -     D-Dimer, Quantitative; Standing  -     Discontinue: amLODIPine tablet 5 mg  -     EXTRA TUBES; Standing  -     POCT glucose; Standing  -     Discontinue: 0.9 % NaCl with KCl 20 mEq infusion  -     Clip and Prep Other (please specifiy); Standing  -     Verify informed consent; Standing  -     Cancel: Diet NPO Except for: Medication, Sips with Medication; Standing  -     Discontinue: fentaNYL 50 mcg/mL injection  -     Discontinue: midazolam (VERSED) 1 mg/mL injection  -     Discontinue: LIDOcaine (PF) 10 mg/ml (1%) injection  -     Discontinue: iopamidoL (ISOVUE-370) injection  -     Cancel: Bed rest; Standing  -     Elevate HOB 30; Standing  -     Discontinue: 0.45% NaCl infusion  -     acetaminophen tablet 650 mg  -     ondansetron disintegrating tablet 8 mg  -     Discontinue: heparin infusion 1,000 units/500 ml in 0.9% NaCl (on sterile field)  -     Discontinue: heparin (porcine) 5,000 Units in sodium chloride 0.9% 500 mL  -     diltiaZEM tablet 30 mg  -     POCT glucose; Standing  -     nitroGLYCERIN SL tablet 0.4 mg  -     POCT glucose; Standing  -     Oxygen Continuous; Standing  -     POCT glucose; Standing  -     enoxaparin injection 60 mg  -     POCT glucose; Standing  -     isosorbide mononitrate 24 hr tablet 30 mg  -     potassium chloride SA CR tablet 20 mEq  -     POCT glucose; Standing  -     POCT glucose; Standing  -     POCT glucose; Standing  -     CBC Auto Differential; Standing  -     Basic Metabolic Panel; Standing  -     POCT glucose; Standing  -     POCT glucose; Standing  -     Diet consistent carbohydrate 1500 (45g Carbs/ meal); Standing  -     POCT glucose; Standing  -     Hold Enoxaparin/subcutaneous Heparin; Standing  -     Height and weight; Standing  -     Insert 2 peripheral IVs; Standing  -     Clip and Prep Left Femoral, Right Femoral; Standing  -      Verify informed consent; Standing  -     Vital signs; Standing  -     Cardiac Monitoring - Adult; Standing  -     Pulse Oximetry Q4H; Standing  -     Diet NPO Except for: Medication, Sips with Medication; Standing  -     0.9%  NaCl infusion       IMP/Plan:  NSTEMI, with severe three vessel CAD, reviewed cath films with CTS, LAD and RCA not surgical targets, discussed options at length, recommend PCI with DILMA Cx, poss LAD, risks and benefits discussed, pt elects to proceed, will schedule PCI in AM                                2. A fib with controlled ventricular response, will start Eliquis post PCI 3. Well compensated chronic systolic heart failure due to ischemic cardiomyopathy,                                                                    Mingo Denson DO, FACC, FACOI  [facility]

## 2022-11-21 NOTE — NURSING
Patient refused IV stick, IV to left FA works, but site is bruised and reddened. Jomar, RN charge nurse asked to talk to patient to get an IV, patient still refused. She states she wants to get a new IV after she is put to sleep. I explained to patient that in order for her medications to work she must have a good IV, patient again refused.

## 2022-11-22 VITALS
HEART RATE: 59 BPM | TEMPERATURE: 99 F | BODY MASS INDEX: 26.41 KG/M2 | HEIGHT: 59 IN | RESPIRATION RATE: 18 BRPM | WEIGHT: 131 LBS | SYSTOLIC BLOOD PRESSURE: 100 MMHG | OXYGEN SATURATION: 97 % | DIASTOLIC BLOOD PRESSURE: 56 MMHG

## 2022-11-22 LAB
ALBUMIN SERPL BCP-MCNC: 3.3 G/DL (ref 3.5–5)
ALBUMIN/GLOB SERPL: 1.1 {RATIO}
ALP SERPL-CCNC: 64 U/L (ref 55–142)
ALT SERPL W P-5'-P-CCNC: 23 U/L (ref 13–56)
ANION GAP SERPL CALCULATED.3IONS-SCNC: 13 MMOL/L (ref 7–16)
AST SERPL W P-5'-P-CCNC: 19 U/L (ref 15–37)
BASOPHILS # BLD AUTO: 0.05 K/UL (ref 0–0.2)
BASOPHILS NFR BLD AUTO: 0.8 % (ref 0–1)
BILIRUB SERPL-MCNC: 0.5 MG/DL (ref ?–1.2)
BUN SERPL-MCNC: 18 MG/DL (ref 7–18)
BUN/CREAT SERPL: 18 (ref 6–20)
CALCIUM SERPL-MCNC: 9.2 MG/DL (ref 8.5–10.1)
CHLORIDE SERPL-SCNC: 104 MMOL/L (ref 98–107)
CO2 SERPL-SCNC: 27 MMOL/L (ref 21–32)
CREAT SERPL-MCNC: 1.02 MG/DL (ref 0.55–1.02)
DIFFERENTIAL METHOD BLD: ABNORMAL
EGFR (NO RACE VARIABLE) (RUSH/TITUS): 57 ML/MIN/1.73M²
EOSINOPHIL # BLD AUTO: 0.26 K/UL (ref 0–0.5)
EOSINOPHIL NFR BLD AUTO: 4.1 % (ref 1–4)
ERYTHROCYTE [DISTWIDTH] IN BLOOD BY AUTOMATED COUNT: 11.9 % (ref 11.5–14.5)
GLOBULIN SER-MCNC: 3.1 G/DL (ref 2–4)
GLUCOSE SERPL-MCNC: 142 MG/DL (ref 74–106)
GLUCOSE SERPL-MCNC: 166 MG/DL (ref 70–105)
GLUCOSE SERPL-MCNC: 241 MG/DL (ref 70–105)
HCT VFR BLD AUTO: 35.1 % (ref 38–47)
HGB BLD-MCNC: 12.2 G/DL (ref 12–16)
IMM GRANULOCYTES # BLD AUTO: 0.07 K/UL (ref 0–0.04)
IMM GRANULOCYTES NFR BLD: 1.1 % (ref 0–0.4)
LYMPHOCYTES # BLD AUTO: 1.92 K/UL (ref 1–4.8)
LYMPHOCYTES NFR BLD AUTO: 30.6 % (ref 27–41)
MCH RBC QN AUTO: 32.9 PG (ref 27–31)
MCHC RBC AUTO-ENTMCNC: 34.8 G/DL (ref 32–36)
MCV RBC AUTO: 94.6 FL (ref 80–96)
MONOCYTES # BLD AUTO: 0.98 K/UL (ref 0–0.8)
MONOCYTES NFR BLD AUTO: 15.6 % (ref 2–6)
MPC BLD CALC-MCNC: 10.3 FL (ref 9.4–12.4)
NEUTROPHILS # BLD AUTO: 2.99 K/UL (ref 1.8–7.7)
NEUTROPHILS NFR BLD AUTO: 47.8 % (ref 53–65)
NRBC # BLD AUTO: 0 X10E3/UL
NRBC, AUTO (.00): 0 %
PLATELET # BLD AUTO: 220 K/UL (ref 150–400)
POC ACTIVATED CLOTTING TIME K: NORMAL
POTASSIUM SERPL-SCNC: 3.7 MMOL/L (ref 3.5–5.1)
PROT SERPL-MCNC: 6.4 G/DL (ref 6.4–8.2)
RBC # BLD AUTO: 3.71 M/UL (ref 4.2–5.4)
SODIUM SERPL-SCNC: 140 MMOL/L (ref 136–145)
WBC # BLD AUTO: 6.27 K/UL (ref 4.5–11)

## 2022-11-22 PROCEDURE — 25000003 PHARM REV CODE 250: Performed by: STUDENT IN AN ORGANIZED HEALTH CARE EDUCATION/TRAINING PROGRAM

## 2022-11-22 PROCEDURE — 85025 COMPLETE CBC W/AUTO DIFF WBC: CPT | Performed by: INTERNAL MEDICINE

## 2022-11-22 PROCEDURE — 94761 N-INVAS EAR/PLS OXIMETRY MLT: CPT

## 2022-11-22 PROCEDURE — 63600175 PHARM REV CODE 636 W HCPCS: Performed by: HOSPITALIST

## 2022-11-22 PROCEDURE — 80053 COMPREHEN METABOLIC PANEL: CPT | Performed by: INTERNAL MEDICINE

## 2022-11-22 PROCEDURE — 63600175 PHARM REV CODE 636 W HCPCS: Performed by: INTERNAL MEDICINE

## 2022-11-22 PROCEDURE — 25000003 PHARM REV CODE 250: Performed by: HOSPITALIST

## 2022-11-22 PROCEDURE — 36415 COLL VENOUS BLD VENIPUNCTURE: CPT | Performed by: INTERNAL MEDICINE

## 2022-11-22 PROCEDURE — 25000003 PHARM REV CODE 250: Performed by: INTERNAL MEDICINE

## 2022-11-22 RX ORDER — METOPROLOL TARTRATE 25 MG/1
25 TABLET, FILM COATED ORAL 2 TIMES DAILY
Status: DISCONTINUED | OUTPATIENT
Start: 2022-11-22 | End: 2022-11-22

## 2022-11-22 RX ORDER — METOPROLOL SUCCINATE 50 MG/1
50 TABLET, EXTENDED RELEASE ORAL DAILY
Status: DISCONTINUED | OUTPATIENT
Start: 2022-11-22 | End: 2022-11-22 | Stop reason: HOSPADM

## 2022-11-22 RX ORDER — ASPIRIN 81 MG/1
81 TABLET ORAL DAILY
Status: DISCONTINUED | OUTPATIENT
Start: 2022-11-22 | End: 2022-11-22

## 2022-11-22 RX ORDER — METOPROLOL SUCCINATE 50 MG/1
50 TABLET, EXTENDED RELEASE ORAL DAILY
Qty: 30 TABLET | Refills: 0 | Status: SHIPPED | OUTPATIENT
Start: 2022-11-23 | End: 2022-12-13 | Stop reason: SDUPTHER

## 2022-11-22 RX ORDER — LISINOPRIL 20 MG/1
20 TABLET ORAL 2 TIMES DAILY
Qty: 60 TABLET | Refills: 0 | Status: SHIPPED | OUTPATIENT
Start: 2022-11-22 | End: 2022-12-13 | Stop reason: SDUPTHER

## 2022-11-22 RX ORDER — SPIRONOLACTONE 25 MG/1
25 TABLET ORAL DAILY
Qty: 30 TABLET | Refills: 0 | Status: SHIPPED | OUTPATIENT
Start: 2022-11-23 | End: 2022-12-13 | Stop reason: SDUPTHER

## 2022-11-22 RX ORDER — ISOSORBIDE MONONITRATE 30 MG/1
30 TABLET, EXTENDED RELEASE ORAL DAILY
Qty: 30 TABLET | Refills: 0 | Status: SHIPPED | OUTPATIENT
Start: 2022-11-23 | End: 2022-12-13 | Stop reason: SDUPTHER

## 2022-11-22 RX ORDER — ASPIRIN 81 MG/1
81 TABLET ORAL DAILY
Qty: 30 TABLET | Refills: 0 | Status: CANCELLED | OUTPATIENT
Start: 2022-11-23 | End: 2022-12-23

## 2022-11-22 RX ADMIN — ENOXAPARIN SODIUM 60 MG: 60 INJECTION SUBCUTANEOUS at 12:11

## 2022-11-22 RX ADMIN — LISINOPRIL 20 MG: 20 TABLET ORAL at 09:11

## 2022-11-22 RX ADMIN — ISOSORBIDE MONONITRATE 30 MG: 30 TABLET, EXTENDED RELEASE ORAL at 09:11

## 2022-11-22 RX ADMIN — ASPIRIN 81 MG: 81 TABLET, COATED ORAL at 09:11

## 2022-11-22 RX ADMIN — CLOPIDOGREL BISULFATE 75 MG: 75 TABLET ORAL at 09:11

## 2022-11-22 RX ADMIN — SPIRONOLACTONE 25 MG: 25 TABLET ORAL at 09:11

## 2022-11-22 RX ADMIN — INSULIN ASPART 2 UNITS: 100 INJECTION, SOLUTION INTRAVENOUS; SUBCUTANEOUS at 06:11

## 2022-11-22 RX ADMIN — EZETIMIBE 10 MG: 10 TABLET ORAL at 09:11

## 2022-11-22 RX ADMIN — PANTOPRAZOLE SODIUM 40 MG: 40 TABLET, DELAYED RELEASE ORAL at 09:11

## 2022-11-22 RX ADMIN — PAROXETINE HYDROCHLORIDE 10 MG: 10 TABLET, FILM COATED ORAL at 06:11

## 2022-11-22 RX ADMIN — HYDRALAZINE HYDROCHLORIDE 25 MG: 25 TABLET ORAL at 09:11

## 2022-11-22 RX ADMIN — METOPROLOL SUCCINATE 50 MG: 50 TABLET, FILM COATED, EXTENDED RELEASE ORAL at 09:11

## 2022-11-22 NOTE — MED STUDENT SUBJECTIVE & OBJECTIVE
Medical Student Subjective & Objective     Principal Problem: NSTEMI (non-ST elevated myocardial infarction)    Chief Complaint: chest pain    Interval History: [facility] yesterday with stents placed. She is resting comfortably in bed with no complaints at this time. She denies chest pain, SOB, palpitations, weakness and fevers.        Review of Systems   Constitutional:  Negative for activity change and fever.   Respiratory:  Negative for cough, chest tightness and shortness of breath.    Cardiovascular:  Negative for chest pain, palpitations and leg swelling.   Gastrointestinal:  Negative for abdominal pain, nausea and vomiting.   Musculoskeletal:  Negative for arthralgias.   Neurological:  Negative for dizziness and weakness.   Psychiatric/Behavioral:  Negative for agitation. The patient is not nervous/anxious.      Objective:     Vital Signs (Most Recent):  Temp: 98.5 °F (36.9 °C) (11/22/22 1035)  Pulse: (!) 59 (11/22/22 1035)  Resp: 18 (11/22/22 1035)  BP: (!) 100/56 (11/22/22 1035)  SpO2: 97 % (11/22/22 1035)   Vital Signs (24h Range):  Temp:  [97.6 °F (36.4 °C)-98.5 °F (36.9 °C)] 98.5 °F (36.9 °C)  Pulse:  [54-66] 59  Resp:  [16-18] 18  SpO2:  [95 %-97 %] 97 %  BP: (100-167)/(51-67) 100/56     Weight: 59.4 kg (131 lb)  Body mass index is 26.46 kg/m².    Intake/Output Summary (Last 24 hours) at 11/22/2022 1248  Last data filed at 11/22/2022 0530  Gross per 24 hour   Intake 350 ml   Output --   Net 350 ml      Physical Exam  Constitutional:       Appearance: Normal appearance.   HENT:      Head: Normocephalic and atraumatic.      Right Ear: External ear normal.      Left Ear: External ear normal.      Mouth/Throat:      Pharynx: Oropharynx is clear.   Eyes:      Pupils: Pupils are equal, round, and reactive to light.   Cardiovascular:      Rate and Rhythm: Normal rate.      Pulses: Normal pulses.      Heart sounds: Normal heart sounds.   Pulmonary:      Effort: Pulmonary effort is normal.      Breath sounds: Normal  breath sounds.   Abdominal:      General: Abdomen is flat.      Palpations: Abdomen is soft.   Musculoskeletal:         General: Normal range of motion.      Cervical back: Normal range of motion.   Skin:     General: Skin is warm and dry.   Neurological:      General: No focal deficit present.      Mental Status: She is alert and oriented to person, place, and time.   Psychiatric:         Mood and Affect: Mood normal.         Thought Content: Thought content normal.       Significant Labs: All pertinent labs within the past 24 hours have been reviewed.    Significant Imaging: I have reviewed all pertinent imaging results/findings within the past 24 hours.    Medical Student Subjective & Objective

## 2022-11-22 NOTE — NURSING
D/c instructions, medication and follow-up given, verbalized understanding. Off unit via w/c to POV

## 2022-11-22 NOTE — DISCHARGE SUMMARY
"[facility] Hospital Medicine  Discharge Summary      Patient Name: Erlinda Cuevas  MRN: [id number]  ELKIN: [id number]  Patient Class: IP- Inpatient  Admission Date: 11/17/2022  Hospital Length of Stay: 5 days  Discharge Date and Time:  11/22/2022 2:21 PM  Attending Physician: Adelaide Marshall MD   Discharging Provider: MADDIE Doherty  Primary Care Provider: Rigoberto Espinosa III, DO    Primary Care Team: Networked reference to record PCT     HPI:   Chief complaint:  Chest pain    History of present illness:    Ms. Cuevas is 77-year-old presented to the emergency room with on and off chest pain over the last 2 weeks.  Has occurred with rest but also with exertion.  Symptoms associated with shortness of breaths.  Worse episode earlier day of admission associated with some diaphoresis and nausea vomiting x1.  When seen in the emergency room was noted to have atrial fibrillation with rate 120.  No prior history of atrial fibrillation.  Since heart rates been more controlled in the 60s.  Was noted have elevated troponin.  Patient is followed by Dr. Nuñez for nonischemic cardiomyopathy.  States told in the past she had "broken heart with normal coronary arteries.     Review of systems:  See above.  Patient gets occasional jerking movement that causes her to fall.  She is been evaluated in the past in [facility]rology [facility] by Diego Benitez NP.  States has a Rollator but does not use it often.  Four weeks ago had syncopal like episode where she fell and thinks she might have been unconscious just for a few seconds.  No tongue biting or incontinence.  No chest pain or palpitation.  States Dr. Espinosa adjust raised her blood pressure medicine up and she thinks he gave her too much.   Review systems otherwise negative.    Past medical history:  -essential hypertension times 20 years.  -diabetes mellitus type 2 diagnosed in 1999 controlled with oral agents.  -cerebrovascular " accident x2.  States with 1st stroke lost partial peripheral vision to left eye.  Second stroke caused weakness to right side left with weakness to left upper extremity and some paresthesias there.    Past surgical history:  Removed floater from right eye  Bilateral cataract surgery   section    Social history:  Lives alone  States   2 years earlier  Son lives in [place]  States has neighbors that she can call if needs help  No history of tobacco alcohol use    Family history:  No one in family premature coronary artery disease.    Health maintenance issues:  Has seen Dr. ROCHA in past but plans on getting a new doctor and was prior recommended to see Dr. Hurd.  Does not take flu shots Pneumovax or any prior COVID vaccination  Does not do Pap smears, mammograms or colonoscopies and understands these are important cancer screening studies but not does not plan to have done.             Procedure(s) (LRB):  ANGIOGRAM, CORONARY, INCLUDING BYPASS GRAFT, WITH LEFT HEART CATHETERIZATION (N/A)  Percutaneous coronary intervention (N/A)      Hospital Course:    - no additional chest pain.  No shortness of breath.  Main complaint is wanting something to eat.  Markedly elevated troponin.  Cardiology to see.  Await studies.    :  Plan for left heart catheterization today.    : Patient will be discharged home today. She was admitted after she presented to the ED with on and off chest pain over the previous 2 weeks. When she was seen in the ED she was noted to have A-Fib with rate of 120 and no previous history of this. She was also noted to have Troponin elevation from 122 to 383. Her ECHO showed EF of 35%. Cardiology was consulted for NSTEMI and patient underwent LHC on  and was noted to have severe triple vessel CAD and Dr. eDnson noted that he reviewed films with CTS in which LAD and RCA not surgical targets for possible CABG. LHC on  and patient is now s/p successful PCI to mid LAD.  Discharge medications followed per cardiology recommendations; however, patient stated she does not have insurance coverage for prescriptions and did not plan to take Eliquis, Imdur, or Spironolactone. Discussed with patient at length importance of these medications and could have [facility] to bring them up. Cardiac Rehab was also initiated by SS and patient also refused that. She is unsure if she will move to [place] with her son in the near future to live with him while she recovers.        Goals of Care Treatment Preferences:  Code Status: Full Code      Consults:   Consults (From admission, onward)        Status Ordering Provider     Inpatient consult to Social Work  Once        Provider:  (Not yet assigned)    Completed JIM CAMACHO     Inpatient consult to Cardiology  Once        Provider:  Mingo Denson,     Completed CARLOTTA AREVALO          * NSTEMI (non-ST elevated myocardial infarction)  Chest pain this AM relieved with one sl ntg, continue on heparin until completes revascularization.     11/21: plan for [facility] today.      D-dimer: 1.75  Lipid panel:   Lab Results   Component Value Date    CHOL 201 (H) 11/18/2022    CHOL 273 (H) 06/22/2022    CHOL 266 (H) 09/07/2021     Lab Results   Component Value Date    HDL 31 (L) 11/18/2022    HDL 31 (L) 06/22/2022    HDL 40 09/07/2021     Lab Results   Component Value Date    LDLCALC  06/22/2022      Comment:      Unable to calculate due to one of the following values:  Cholesterol <5  HDL Cholesterol <5  Triglycerides <10 or >400    LDLCALC  09/07/2021      Comment:      Unable to calculate due to one of the following values:  Cholesterol <5  HDL Cholesterol <5  Triglycerides <10 or >400     A1c:   Lab Results   Component Value Date    HGBA1C 7.7 (H) 06/22/2022     SANDRA Risk Score:      (age>65, ASA, 3 risk factors, known CAD, angina, ECG, troponin)  >5    Diagnostics: trend troponin, telemetry, echo if not recent, consider repeat ECG    Plan:    Oxygen  high intensity statin: Not done given allergy to statin   beta blocker: metoprolol   ASA: not done per cardiology but plavix.    Anticoagulation: lovenox   ACE-inhibitor and spironolactone given EF 35%.    Consulted cardiology and appreciate recommendations.     Cardiac rehab ( consult)  If applicable, educate on medication adherence, blood pressure control, stress reduction, cholesterol, tobacco use, weight management, diet, diabetes, physical activity      Current Facility-Administered Medications:     acetaminophen tablet 650 mg, 650 mg, Oral, Q4H PRN, Mingo Denson DO    apixaban tablet 5 mg, 5 mg, Oral, BID, Temitope Pope, FNYG    clopidogreL tablet 75 mg, 75 mg, Oral, Daily, Lonnie Deleon MD, 75 mg at 11/22/22 0943    dextrose 10% bolus 125 mL, 12.5 g, Intravenous, PRN, Lonnie Deleon MD    dextrose 10% bolus 250 mL, 25 g, Intravenous, PRN, Lonnie Deleon MD    ezetimibe tablet 10 mg, 10 mg, Oral, Daily, Lonnie Deleon MD, 10 mg at 11/22/22 0942    glucagon (human recombinant) injection 1 mg, 1 mg, Intramuscular, PRN, Lonnie Deleon MD    glucose chewable tablet 16 g, 16 g, Oral, PRN, Lonnie Deleon MD    glucose chewable tablet 24 g, 24 g, Oral, PRN, Lonnie Deleon MD    hydrALAZINE tablet 25 mg, 25 mg, Oral, BID, Lonnie Deleon MD, 25 mg at 11/22/22 0942    insulin aspart U-100 injection 1-10 Units, 1-10 Units, Subcutaneous, QID (AC + HS) PRN, Lonnie Deleon MD, 2 Units at 11/22/22 0618    isosorbide mononitrate 24 hr tablet 30 mg, 30 mg, Oral, Daily, Mingo Denson DO, 30 mg at 11/22/22 0941    lisinopriL tablet 20 mg, 20 mg, Oral, BID, 20 mg at 11/22/22 0941 **AND** [DISCONTINUED] hydroCHLOROthiazide tablet 12.5 mg, 12.5 mg, Oral, BID, Lonnie Deleon MD, 12.5 mg at 11/21/22 0817    metoprolol succinate (TOPROL-XL) 24 hr tablet 50 mg, 50 mg, Oral, Daily, Bienvenido Morris MD, 50 mg at 11/22/22 0945    naloxone 0.4 mg/mL injection 0.02 mg, 0.02 mg, Intravenous,  PRN, Lonnie Deleon MD    nitroGLYCERIN SL tablet 0.4 mg, 0.4 mg, Sublingual, Q5 Min PRN, Britni Sutton MD, 0.4 mg at 11/18/22 1910    ondansetron disintegrating tablet 8 mg, 8 mg, Oral, Q8H PRN, Mingo Denson,     pantoprazole EC tablet 40 mg, 40 mg, Oral, Daily, Lonnie Deleon MD, 40 mg at 11/22/22 0943    paroxetine tablet 10 mg, 10 mg, Oral, QAM, Lonnie Deleon MD, 10 mg at 11/22/22 0618    promethazine tablet 25 mg, 25 mg, Oral, Q6H PRN, Lonnie Deleon MD    sodium chloride 0.9% flush 10 mL, 10 mL, Intravenous, Q12H PRN, Lonnie Deleon MD    spironolactone tablet 25 mg, 25 mg, Oral, Daily, Bienvenido Morris MD, 25 mg at 11/22/22 0942         Results for orders placed during the hospital encounter of 11/17/22    Echo    Interpretation Summary  · The left ventricle is normal in size with concentric hypertrophy and moderately decreased systolic function.  · The estimated ejection fraction is 35%.  · Left ventricular diastolic dysfunction.  · Atrial fibrillation not observed.  · Normal right ventricular size.  · Mild mitral regurgitation.  · There is pulmonary hypertension.  · Mild to moderate tricuspid regurgitation.  · Mild pulmonic regurgitation.  · Normal central venous pressure (3 mmHg).  · The estimated PA systolic pressure is 47 mmHg.  · Trivial pericardial effusion.    Patient refused cardiac rehab. Patient also stated that she does not have prescription insurance coverage and pays out of pocket for all her medications. States that she plans to not take recommended medications and continue her home medication regimen.     Elevated troponin  Cardiology to see  Obtain prior left heart catheterization results    11/18 markedly elevated troponin today.  Doubt PE but check D-dimer.  Echocardiogram pending    11/22: Under went LHC on 11/18 then stent placement on 11/21.    Frequent falls  Physical therapy to see      Atrial fibrillation with RVR  Patient with new onset atrial fibrillation which  is controlled currently with Beta Blocker. Patient is currently in atrial fibrillation.[id number] Score: 7. HASBLED Score: 3 ( moderate risk of bleeding in range of 3.7-6% per year). Anticoagulation indicated. Anticoagulation done with heparin.    Currently rate controlled.    Per cardiology:  -Eliquis 5 mg   -Toprol XL 50 mg    Bilateral carotid artery stenosis  Outpatient follow-up      Takotsubo cardiomyopathy  Cardiology plans on returning patient to cath lab for further stenting on Monday 11/21/2022..    Cardiac catheterization     Conclusion    The Ost Cx to Prox Cx lesion was 90% stenosed with 0% stenosis post-intervention.    The Prox LAD to Mid LAD lesion was 95% stenosed with 0% stenosis post-intervention.    The 1st Mrg lesion was 80% stenosed with 0% stenosis post-intervention.    A stent was successfully placed.    A stent was successfully placed.    A stent was successfully placed.    The estimated blood loss was none.    There was three vessel coronary artery disease.     The procedure log was documented by Documenter: RT Rola and verified by Mingo Denson DO.     Date: 11/21/2022  Time: 1:42 PM  Severe stenosis proximal Cx, OM1 undergoing successful PCI with DILMA x 4  Severe stenosis mid LAD undergoing successful PCI with DILMA x 1  Residual severe stenosis in Mid RCA, too small for intervention      Hemiparesis affecting left side as late effect of cerebrovascular accident (CVA)  Therapy team to see especially with history of recent falls.  Continue PT/OT.      Systolic hypertension  Adjust medications as needed  Per cardiology recs:  Lisinopril  Imdur  Metoprolol  spironolactone     Type 2 diabetes mellitus, without long-term current use of insulin  Patient's FSGs are controlled on current medication regimen.  Last A1c reviewed-   Lab Results   Component Value Date    HGBA1C 7.7 (H) 06/22/2022     Most recent fingerstick glucose reviewed- No results for input(s): POCTGLUCOSE in the  last 24 hours.  Current correctional scale  Medium  Maintain anti-hyperglycemic dose as follows-   Antihyperglycemics (From admission, onward)    Start     Stop Route Frequency Ordered    11/17/22 2054  insulin aspart U-100 injection 1-10 Units         -- SubQ Before meals & nightly PRN 11/17/22 1959        Hold Oral hypoglycemics while patient is in the hospital.      Final Active Diagnoses:    Diagnosis Date Noted POA    PRINCIPAL PROBLEM:  NSTEMI (non-ST elevated myocardial infarction) [I21.4] 11/19/2022 Yes    Hypokalemia [E87.6] 11/19/2022 Yes    Elevated troponin [R77.8] 11/18/2022 Yes    Atrial fibrillation with RVR [I48.91] 11/17/2022 Yes    Frequent falls [R29.6] 11/17/2022 Not Applicable    Bilateral carotid artery stenosis [I65.23] 11/15/2021 Yes    Takotsubo cardiomyopathy [I51.81]  Yes    Hemiparesis affecting left side as late effect of cerebrovascular accident (CVA) [I69.354] 09/07/2021 Not Applicable    Type 2 diabetes mellitus, without long-term current use of insulin [E11.9] 03/16/2021 Yes    Systolic hypertension [I10] 03/16/2021 Yes      Problems Resolved During this Admission:       Discharged Condition: stable    Disposition: Home-Health Care [facility]    Follow Up:   Follow-up Information     Mima Sierra DO. Schedule an appointment as soon as possible for a visit in 1 week(s).    Specialty: Family Medicine  Why: Hospital d/c follow- Establish care  Contact information:  [address] [place] [id number]  [phone]             AMANDEEP Madrid. Schedule an appointment as soon as possible for a visit in 2 week(s).    Specialty: Cardiology  Why: Hospital d/c follow up -NSTEMI  Contact information:  [address] [place] [address] [facility] [address]  [phone]                       Patient Instructions:      Diet diabetic     Notify your health care provider if you experience any of the following:  difficulty breathing or increased cough     Notify  your health care provider if you experience any of the following:  persistent dizziness, light-headedness, or visual disturbances     Notify your health care provider if you experience any of the following:  increased confusion or weakness     Notify your health care provider if you experience any of the following:  severe uncontrolled pain     Notify your health care provider if you experience any of the following:  persistent nausea and vomiting or diarrhea     Notify your health care provider if you experience any of the following:  temperature >100.4     EKG 12-lead   Standing Status: Future Standing Exp. Date: 11/20/23     Activity as tolerated       Significant Diagnostic Studies: Labs:   BMP:   Recent Labs   Lab 11/21/22  0532 11/22/22  0443   * 142*    140   K 3.7 3.7    104   CO2 29 27   BUN 17 18   CREATININE 1.04* 1.02   CALCIUM 8.6 9.2   , CBC   Recent Labs   Lab 11/21/22  0532 11/22/22  0443   WBC 5.64 6.27   HGB 11.7* 12.2   HCT 34.0* 35.1*    220   , A1C:   Recent Labs   Lab 06/22/22  1002   HGBA1C 7.7*    and All labs within the past 24 hours have been reviewed     Medications:  Reconciled Home Medications:      Medication List      START taking these medications    apixaban 5 mg Tab  Commonly known as: ELIQUIS  Take 1 tablet (5 mg total) by mouth 2 (two) times daily.     isosorbide mononitrate 30 MG 24 hr tablet  Commonly known as: IMDUR  Take 1 tablet (30 mg total) by mouth once daily.  Start taking on: November 23, 2022     lisinopriL 20 MG tablet  Commonly known as: PRINIVIL,ZESTRIL  Take 1 tablet (20 mg total) by mouth 2 (two) times a day.     metoprolol succinate 50 MG 24 hr tablet  Commonly known as: TOPROL-XL  Take 1 tablet (50 mg total) by mouth once daily.  Start taking on: November 23, 2022     spironolactone 25 MG tablet  Commonly known as: ALDACTONE  Take 1 tablet (25 mg total) by mouth once daily.  Start taking on: November 23, 2022        CONTINUE taking these  medications    clindamycin 1 % lotion  Commonly known as: CLEOCIN T  APPLY TOPICALLY TWICE DAILY TO THE AFFECTED AREA(S) OF BOTH ARMPITS     clopidogreL 75 mg tablet  Commonly known as: PLAVIX  Take 1 tablet (75 mg total) by mouth once daily.     ezetimibe 10 mg tablet  Commonly known as: ZETIA  Take 1 tablet (10 mg total) by mouth once daily.     hydrALAZINE 25 MG tablet  Commonly known as: APRESOLINE  Take 1 tablet (25 mg total) by mouth 2 (two) times daily.     metFORMIN 500 MG tablet  Commonly known as: GLUCOPHAGE  Take 2 tablets (1,000 mg total) by mouth 2 (two) times daily with meals. Take 2 tabs twice daily     paroxetine 10 MG tablet  Commonly known as: PAXIL  Take 1 tablet (10 mg total) by mouth every morning.     vitamin E 400 UNIT capsule  Take 400 Units by mouth once daily.        STOP taking these medications    lisinopriL-hydrochlorothiazide 20-12.5 mg per tablet  Commonly known as: PRINZIDE,ZESTORETIC     metoprolol tartrate 100 MG tablet  Commonly known as: LOPRESSOR     pioglitazone 45 MG tablet  Commonly known as: ACTOS            Indwelling Lines/Drains at time of discharge:   Lines/Drains/Airways     None             The patient has been seen and examined by both myself and Dr Adelaide Marshall MD on the date of discharge and determined to be suitable/stable for discharge.    Time spent on the discharge of patient: Greater than 30 minutes         MADDIE Doherty  Department of Hospital Medicine  [facility] Tele[facility]

## 2022-11-22 NOTE — ASSESSMENT & PLAN NOTE
Patient with new onset atrial fibrillation which is controlled currently with Beta Blocker. Patient is currently in atrial fibrillation.[id number] Score: 7. HASBLED Score: 3 ( moderate risk of bleeding in range of 3.7-6% per year). Anticoagulation indicated. Anticoagulation done with heparin.    Currently rate controlled.    Per cardiology:  -Eliquis 5 mg   -Toprol XL 50 mg

## 2022-11-22 NOTE — ASSESSMENT & PLAN NOTE
Cardiology to see  Obtain prior left heart catheterization results    11/18 markedly elevated troponin today.  Doubt PE but check D-dimer.  Echocardiogram pending    11/22: Under went LHC on 11/18 then stent placement on 11/21.

## 2022-11-22 NOTE — SUBJECTIVE & OBJECTIVE
Interval History:     Review of Systems   Constitutional:  Negative for appetite change, fatigue and fever.   HENT:  Negative for congestion, hearing loss and trouble swallowing.    Respiratory:  Positive for shortness of breath. Negative for chest tightness and wheezing.    Cardiovascular:  Positive for chest pain. Negative for palpitations.   Gastrointestinal:  Positive for nausea and vomiting. Negative for abdominal pain and constipation.   Genitourinary:  Negative for difficulty urinating and dysuria.   Musculoskeletal:  Positive for gait problem. Negative for back pain and neck stiffness.   Skin:  Negative for pallor and rash.   Neurological:  Positive for weakness. Negative for dizziness, speech difficulty and headaches.   Psychiatric/Behavioral:  Negative for confusion and suicidal ideas.    Objective:     Vital Signs (Most Recent):  Temp: 97.8 °F (36.6 °C) (11/21/22 1901)  Pulse: 61 (11/21/22 1901)  Resp: 18 (11/21/22 1901)  BP: (!) 136/51 (11/21/22 1901)  SpO2: 95 % (11/21/22 1901)   Vital Signs (24h Range):  Temp:  [97.3 °F (36.3 °C)-98.7 °F (37.1 °C)] 97.8 °F (36.6 °C)  Pulse:  [50-61] 61  Resp:  [18-20] 18  SpO2:  [95 %-100 %] 95 %  BP: (107-170)/() 136/51     Weight: 59.4 kg (130 lb 15.3 oz)  Body mass index is 26.45 kg/m².  No intake or output data in the 24 hours ending 11/21/22 1955   Physical Exam  Vitals reviewed.   Constitutional:       General: She is awake. She is not in acute distress.     Appearance: She is well-developed. She is not toxic-appearing.   HENT:      Head: Normocephalic.      Nose: Nose normal.      Mouth/Throat:      Pharynx: Oropharynx is clear.   Eyes:      Extraocular Movements: Extraocular movements intact.      Pupils: Pupils are equal, round, and reactive to light.   Neck:      Thyroid: No thyroid mass.      Vascular: No carotid bruit.   Cardiovascular:      Rate and Rhythm: Normal rate and regular rhythm.      Pulses: Normal pulses.      Heart sounds: Normal heart  sounds. No murmur heard.  Pulmonary:      Effort: Pulmonary effort is normal.      Breath sounds: Normal breath sounds and air entry. No wheezing.   Abdominal:      General: Bowel sounds are normal. There is no distension.      Palpations: Abdomen is soft.      Tenderness: There is no abdominal tenderness.   Musculoskeletal:         General: Normal range of motion.      Cervical back: Neck supple. No rigidity.   Skin:     General: Skin is warm.      Coloration: Skin is not jaundiced.      Findings: No lesion.   Neurological:      Mental Status: She is alert and oriented to person, place, and time.      Cranial Nerves: No cranial nerve deficit.      Comments:   RUE   4 / 5  LUE    5 /5  RLE    4 / 5  LLE    5 / 5   Psychiatric:         Attention and Perception: Attention normal.         Mood and Affect: Mood normal.         Behavior: Behavior normal. Behavior is cooperative.         Thought Content: Thought content normal.         Cognition and Memory: Cognition normal.       Significant Labs: All pertinent labs within the past 24 hours have been reviewed.    Significant Imaging: I have reviewed all pertinent imaging results/findings within the past 24 hours.

## 2022-11-22 NOTE — PLAN OF CARE
SW went to bedside to discuss cardiac rehab referral with pt. Pt stated that she did not want inpt cardiac rehab. SW further discussed benefits of cardiac rehab and offered cardiac rehab via hh as an option. Pt stated that she took care of her , who was on home hospice services. Pt further explained that she felt like people coming into her home disrupts her way of living and ADLs. SW further explained that cardiac rehab for a few weeks would be of great benefit to pt, even in the home setting. Pt subsequently declined cardiac rehab, stating that she is unsure if she will be selling her home and moving to [place], [place] with her son. SW left cardiac rehab resource(s) and SW contact at bedside for pt. SS following for further dc needs.

## 2022-11-22 NOTE — ASSESSMENT & PLAN NOTE
Adjust medications as needed  Per cardiology recs:  Lisinopril  Imdur  Metoprolol  spironolactone

## 2022-11-22 NOTE — ASSESSMENT & PLAN NOTE
Cardiology plans on returning patient to cath lab for further stenting on Monday 11/21/2022..    Cardiac catheterization     Conclusion    The Ost Cx to Prox Cx lesion was 90% stenosed with 0% stenosis post-intervention.    The Prox LAD to Mid LAD lesion was 95% stenosed with 0% stenosis post-intervention.    The 1st Mrg lesion was 80% stenosed with 0% stenosis post-intervention.    A stent was successfully placed.    A stent was successfully placed.    A stent was successfully placed.    The estimated blood loss was none.    There was three vessel coronary artery disease.     The procedure log was documented by Documenter: RT Rola and verified by Mingo Denson DO.     Date: 11/21/2022  Time: 1:42 PM  Severe stenosis proximal Cx, OM1 undergoing successful PCI with DILMA x 4  Severe stenosis mid LAD undergoing successful PCI with DILMA x 1  Residual severe stenosis in Mid RCA, too small for intervention

## 2022-11-22 NOTE — ASSESSMENT & PLAN NOTE
Chest pain this AM relieved with one sl ntg, continue on heparin until completes revascularization.     11/21: plan for [facility] today.      D-dimer: 1.75  Lipid panel:   Lab Results   Component Value Date    CHOL 201 (H) 11/18/2022    CHOL 273 (H) 06/22/2022    CHOL 266 (H) 09/07/2021     Lab Results   Component Value Date    HDL 31 (L) 11/18/2022    HDL 31 (L) 06/22/2022    HDL 40 09/07/2021     Lab Results   Component Value Date    LDLCALC  06/22/2022      Comment:      Unable to calculate due to one of the following values:  Cholesterol <5  HDL Cholesterol <5  Triglycerides <10 or >400    LDLCALC  09/07/2021      Comment:      Unable to calculate due to one of the following values:  Cholesterol <5  HDL Cholesterol <5  Triglycerides <10 or >400     A1c:   Lab Results   Component Value Date    HGBA1C 7.7 (H) 06/22/2022     SANDRA Risk Score:      (age>65, ASA, 3 risk factors, known CAD, angina, ECG, troponin)  >5    Diagnostics: trend troponin, telemetry, echo if not recent, consider repeat ECG    Plan:   Oxygen  high intensity statin: Not done given allergy to statin   beta blocker: metoprolol   ASA: not done per cardiology but plavix.    Anticoagulation: lovenox   ACE-inhibitor and spironolactone given EF 35%.    Consulted cardiology and appreciate recommendations.     Cardiac rehab ( consult)  If applicable, educate on medication adherence, blood pressure control, stress reduction, cholesterol, tobacco use, weight management, diet, diabetes, physical activity      Current Facility-Administered Medications:     0.45% NaCl infusion, 125 mL/hr, Intravenous, Continuous, Mingo Denson DO, Last Rate: 125 mL/hr at 11/21/22 1533, 125 mL/hr at 11/21/22 1533    acetaminophen tablet 650 mg, 650 mg, Oral, Q4H PRN, Mingo Denson DO    clopidogreL tablet 75 mg, 75 mg, Oral, Daily, Lonnie Deleon MD, 75 mg at 11/21/22 0816    dextrose 10% bolus 125 mL, 12.5 g, Intravenous, PRN, Lonnie Deleon MD    dextrose  10% bolus 250 mL, 25 g, Intravenous, PRN, Lonnie Deleon MD    enoxaparin injection 60 mg, 1 mg/kg, Subcutaneous, Q12H, Mingo Denson DO, 60 mg at 11/20/22 1242    ezetimibe tablet 10 mg, 10 mg, Oral, Daily, Lonnie Deleon MD, 10 mg at 11/21/22 0817    glucagon (human recombinant) injection 1 mg, 1 mg, Intramuscular, PRN, Lonnie Deleon MD    glucose chewable tablet 16 g, 16 g, Oral, PRN, Lonnie Deleon MD    glucose chewable tablet 24 g, 24 g, Oral, PRN, Lonnie Deleon MD    hydrALAZINE tablet 25 mg, 25 mg, Oral, BID, Lonnie Deleon MD, 25 mg at 11/21/22 0817    insulin aspart U-100 injection 1-10 Units, 1-10 Units, Subcutaneous, QID (AC + HS) PRN, Lonnie Deleon MD, 4 Units at 11/21/22 1740    isosorbide mononitrate 24 hr tablet 30 mg, 30 mg, Oral, Daily, Mingo Denson DO, 30 mg at 11/21/22 0817    lisinopriL tablet 20 mg, 20 mg, Oral, BID, 20 mg at 11/21/22 0817 **AND** [DISCONTINUED] hydroCHLOROthiazide tablet 12.5 mg, 12.5 mg, Oral, BID, Lonnie Deleon MD, 12.5 mg at 11/21/22 0817    metoprolol tartrate (LOPRESSOR) tablet 100 mg, 100 mg, Oral, BID, Lonnie Deleon MD, 100 mg at 11/20/22 2031    naloxone 0.4 mg/mL injection 0.02 mg, 0.02 mg, Intravenous, PRN, Lonnie Deleon MD    nitroGLYCERIN SL tablet 0.4 mg, 0.4 mg, Sublingual, Q5 Min PRN, Britni Sutton MD, 0.4 mg at 11/18/22 1910    ondansetron disintegrating tablet 8 mg, 8 mg, Oral, Q8H PRN, Mingo Denson DO    pantoprazole EC tablet 40 mg, 40 mg, Oral, Daily, Lonnie Deleon MD, 40 mg at 11/21/22 0817    paroxetine tablet 10 mg, 10 mg, Oral, QAM, Lonnie Deleon MD, 10 mg at 11/21/22 0603    pioglitazone tablet 45 mg, 45 mg, Oral, Daily, Lonnie Deleon MD, 45 mg at 11/19/22 0946    potassium chloride SA CR tablet 20 mEq, 20 mEq, Oral, Daily, Christopher Menendez DO, 20 mEq at 11/21/22 0817    promethazine tablet 25 mg, 25 mg, Oral, Q6H PRN, Lonnie Deleon MD    sodium chloride 0.9% flush 10 mL, 10 mL, Intravenous, Q12H PRN, Lonnie HULL  MD Pancho    spironolactone tablet 25 mg, 25 mg, Oral, Daily, Bienvenido Morris MD, 25 mg at 11/21/22 1146         Results for orders placed during the hospital encounter of 11/17/22    Echo    Interpretation Summary  · The left ventricle is normal in size with concentric hypertrophy and moderately decreased systolic function.  · The estimated ejection fraction is 35%.  · Left ventricular diastolic dysfunction.  · Atrial fibrillation not observed.  · Normal right ventricular size.  · Mild mitral regurgitation.  · There is pulmonary hypertension.  · Mild to moderate tricuspid regurgitation.  · Mild pulmonic regurgitation.  · Normal central venous pressure (3 mmHg).  · The estimated PA systolic pressure is 47 mmHg.  · Trivial pericardial effusion.

## 2022-11-22 NOTE — PROGRESS NOTES
"[facility] Hospital Medicine  Progress Note    Patient Name: Erlinda Cuevas  MRN: [id number]  Patient Class: IP- Inpatient   Admission Date: 11/17/2022  Length of Stay: 4 days  Attending Physician: Adelaide Marshall MD  Primary Care Provider: Rigoberto Espinosa III, DO        Subjective:     Principal Problem:NSTEMI (non-ST elevated myocardial infarction)        HPI:  Chief complaint:  Chest pain    History of present illness:    Ms. Cuevas is 77-year-old presented to the emergency room with on and off chest pain over the last 2 weeks.  Has occurred with rest but also with exertion.  Symptoms associated with shortness of breaths.  Worse episode earlier day of admission associated with some diaphoresis and nausea vomiting x1.  When seen in the emergency room was noted to have atrial fibrillation with rate 120.  No prior history of atrial fibrillation.  Since heart rates been more controlled in the 60s.  Was noted have elevated troponin.  Patient is followed by Dr. Nuñez for nonischemic cardiomyopathy.  States told in the past she had "broken heart with normal coronary arteries.     Review of systems:  See above.  Patient gets occasional jerking movement that causes her to fall.  She is been evaluated in the past in [facility] by Diego Benitez NP.  States has a Rollator but does not use it often.  Four weeks ago had syncopal like episode where she fell and thinks she might have been unconscious just for a few seconds.  No tongue biting or incontinence.  No chest pain or palpitation.  States Dr. Espinosa adjust raised her blood pressure medicine up and she thinks he gave her too much.   Review systems otherwise negative.    Past medical history:  -essential hypertension times 20 years.  -diabetes mellitus type 2 diagnosed in 1999 controlled with oral agents.  -cerebrovascular accident x2.  States with 1st stroke lost partial peripheral vision to left eye.  Second " stroke caused weakness to right side left with weakness to left upper extremity and some paresthesias there.    Past surgical history:  Removed floater from right eye  Bilateral cataract surgery   section    Social history:  Lives alone  States   2 years earlier  Son lives in [place]  States has neighbors that she can call if needs help  No history of tobacco alcohol use    Family history:  No one in family premature coronary artery disease.    Health maintenance issues:  Has seen Dr. ROCHA in past but plans on getting a new doctor and was prior recommended to see Dr. Hurd.  Does not take flu shots Pneumovax or any prior COVID vaccination  Does not do Pap smears, mammograms or colonoscopies and understands these are important cancer screening studies but not does not plan to have done.             Overview/Hospital Course:   - no additional chest pain.  No shortness of breath.  Main complaint is wanting something to eat.  Markedly elevated troponin.  Cardiology to see.  Await studies.    :  Plan for left heart catheterization today.      Interval History:     Review of Systems   Constitutional:  Negative for appetite change, fatigue and fever.   HENT:  Negative for congestion, hearing loss and trouble swallowing.    Respiratory:  Positive for shortness of breath. Negative for chest tightness and wheezing.    Cardiovascular:  Positive for chest pain. Negative for palpitations.   Gastrointestinal:  Positive for nausea and vomiting. Negative for abdominal pain and constipation.   Genitourinary:  Negative for difficulty urinating and dysuria.   Musculoskeletal:  Positive for gait problem. Negative for back pain and neck stiffness.   Skin:  Negative for pallor and rash.   Neurological:  Positive for weakness. Negative for dizziness, speech difficulty and headaches.   Psychiatric/Behavioral:  Negative for confusion and suicidal ideas.    Objective:     Vital Signs (Most Recent):  Temp: 97.8 °F  (36.6 °C) (11/21/22 1901)  Pulse: 61 (11/21/22 1901)  Resp: 18 (11/21/22 1901)  BP: (!) 136/51 (11/21/22 1901)  SpO2: 95 % (11/21/22 1901)   Vital Signs (24h Range):  Temp:  [97.3 °F (36.3 °C)-98.7 °F (37.1 °C)] 97.8 °F (36.6 °C)  Pulse:  [50-61] 61  Resp:  [18-20] 18  SpO2:  [95 %-100 %] 95 %  BP: (107-170)/() 136/51     Weight: 59.4 kg (130 lb 15.3 oz)  Body mass index is 26.45 kg/m².  No intake or output data in the 24 hours ending 11/21/22 1955   Physical Exam  Vitals reviewed.   Constitutional:       General: She is awake. She is not in acute distress.     Appearance: She is well-developed. She is not toxic-appearing.   HENT:      Head: Normocephalic.      Nose: Nose normal.      Mouth/Throat:      Pharynx: Oropharynx is clear.   Eyes:      Extraocular Movements: Extraocular movements intact.      Pupils: Pupils are equal, round, and reactive to light.   Neck:      Thyroid: No thyroid mass.      Vascular: No carotid bruit.   Cardiovascular:      Rate and Rhythm: Normal rate and regular rhythm.      Pulses: Normal pulses.      Heart sounds: Normal heart sounds. No murmur heard.  Pulmonary:      Effort: Pulmonary effort is normal.      Breath sounds: Normal breath sounds and air entry. No wheezing.   Abdominal:      General: Bowel sounds are normal. There is no distension.      Palpations: Abdomen is soft.      Tenderness: There is no abdominal tenderness.   Musculoskeletal:         General: Normal range of motion.      Cervical back: Neck supple. No rigidity.   Skin:     General: Skin is warm.      Coloration: Skin is not jaundiced.      Findings: No lesion.   Neurological:      Mental Status: She is alert and oriented to person, place, and time.      Cranial Nerves: No cranial nerve deficit.      Comments:   RUE   4 / 5  LUE    5 /5  RLE    4 / 5  LLE    5 / 5   Psychiatric:         Attention and Perception: Attention normal.         Mood and Affect: Mood normal.         Behavior: Behavior normal.  Behavior is cooperative.         Thought Content: Thought content normal.         Cognition and Memory: Cognition normal.       Significant Labs: All pertinent labs within the past 24 hours have been reviewed.    Significant Imaging: I have reviewed all pertinent imaging results/findings within the past 24 hours.      Assessment/Plan:      * NSTEMI (non-ST elevated myocardial infarction)  Chest pain this AM relieved with one sl ntg, continue on heparin until completes revascularization.     11/21: plan for [facility] today.      D-dimer: 1.75  Lipid panel:   Lab Results   Component Value Date    CHOL 201 (H) 11/18/2022    CHOL 273 (H) 06/22/2022    CHOL 266 (H) 09/07/2021     Lab Results   Component Value Date    HDL 31 (L) 11/18/2022    HDL 31 (L) 06/22/2022    HDL 40 09/07/2021     Lab Results   Component Value Date    LDLCALC  06/22/2022      Comment:      Unable to calculate due to one of the following values:  Cholesterol <5  HDL Cholesterol <5  Triglycerides <10 or >400    LDLCALC  09/07/2021      Comment:      Unable to calculate due to one of the following values:  Cholesterol <5  HDL Cholesterol <5  Triglycerides <10 or >400     A1c:   Lab Results   Component Value Date    HGBA1C 7.7 (H) 06/22/2022     SANDRA Risk Score:      (age>65, ASA, 3 risk factors, known CAD, angina, ECG, troponin)  >5    Diagnostics: trend troponin, telemetry, echo if not recent, consider repeat ECG    Plan:   Oxygen  high intensity statin: Not done given allergy to statin   beta blocker: metoprolol   ASA: not done per cardiology but plavix.    Anticoagulation: lovenox   ACE-inhibitor and spironolactone given EF 35%.    Consulted cardiology and appreciate recommendations.     Cardiac rehab ( consult)  If applicable, educate on medication adherence, blood pressure control, stress reduction, cholesterol, tobacco use, weight management, diet, diabetes, physical activity      Current Facility-Administered Medications:     0.45%  NaCl infusion, 125 mL/hr, Intravenous, Continuous, Mingo Denson DO, Last Rate: 125 mL/hr at 11/21/22 1533, 125 mL/hr at 11/21/22 1533    acetaminophen tablet 650 mg, 650 mg, Oral, Q4H PRN, Mingo Denson DO    clopidogreL tablet 75 mg, 75 mg, Oral, Daily, Lonnie Deleon MD, 75 mg at 11/21/22 0816    dextrose 10% bolus 125 mL, 12.5 g, Intravenous, PRN, Lonnie Deleon MD    dextrose 10% bolus 250 mL, 25 g, Intravenous, PRN, Lonine Deleon MD    enoxaparin injection 60 mg, 1 mg/kg, Subcutaneous, Q12H, Mingo Denson DO, 60 mg at 11/20/22 1242    ezetimibe tablet 10 mg, 10 mg, Oral, Daily, Lonnie Deleon MD, 10 mg at 11/21/22 0817    glucagon (human recombinant) injection 1 mg, 1 mg, Intramuscular, PRN, oLnnie Deleon MD    glucose chewable tablet 16 g, 16 g, Oral, PRN, Lonnie Deleon MD    glucose chewable tablet 24 g, 24 g, Oral, PRN, Lonnie Deleon MD    hydrALAZINE tablet 25 mg, 25 mg, Oral, BID, Lonnie Deleon MD, 25 mg at 11/21/22 0817    insulin aspart U-100 injection 1-10 Units, 1-10 Units, Subcutaneous, QID (AC + HS) PRN, Lonnie Deleon MD, 4 Units at 11/21/22 1740    isosorbide mononitrate 24 hr tablet 30 mg, 30 mg, Oral, Daily, Mingo Denson DO, 30 mg at 11/21/22 0817    lisinopriL tablet 20 mg, 20 mg, Oral, BID, 20 mg at 11/21/22 0817 **AND** [DISCONTINUED] hydroCHLOROthiazide tablet 12.5 mg, 12.5 mg, Oral, BID, Lonnie Deleon MD, 12.5 mg at 11/21/22 0817    metoprolol tartrate (LOPRESSOR) tablet 100 mg, 100 mg, Oral, BID, Lonnie Deleon MD, 100 mg at 11/20/22 2031    naloxone 0.4 mg/mL injection 0.02 mg, 0.02 mg, Intravenous, PRN, Lonnie Deleon MD    nitroGLYCERIN SL tablet 0.4 mg, 0.4 mg, Sublingual, Q5 Min PRN, Britni Sutton MD, 0.4 mg at 11/18/22 1910    ondansetron disintegrating tablet 8 mg, 8 mg, Oral, Q8H PRN, Mingo Denson DO    pantoprazole EC tablet 40 mg, 40 mg, Oral, Daily, Lonnie Deleon MD, 40 mg at 11/21/22 0817    paroxetine tablet 10 mg, 10 mg, Oral, QAM,  Lonnie Deleon MD, 10 mg at 11/21/22 0603    pioglitazone tablet 45 mg, 45 mg, Oral, Daily, Lonnie Deleon MD, 45 mg at 11/19/22 0946    potassium chloride SA CR tablet 20 mEq, 20 mEq, Oral, Daily, Christopher Menendez DO, 20 mEq at 11/21/22 0817    promethazine tablet 25 mg, 25 mg, Oral, Q6H PRN, Lonnie Deleon MD    sodium chloride 0.9% flush 10 mL, 10 mL, Intravenous, Q12H PRN, Lonnie Deleon MD    spironolactone tablet 25 mg, 25 mg, Oral, Daily, Bienvenido Morris MD, 25 mg at 11/21/22 1146         Results for orders placed during the hospital encounter of 11/17/22    Echo    Interpretation Summary  · The left ventricle is normal in size with concentric hypertrophy and moderately decreased systolic function.  · The estimated ejection fraction is 35%.  · Left ventricular diastolic dysfunction.  · Atrial fibrillation not observed.  · Normal right ventricular size.  · Mild mitral regurgitation.  · There is pulmonary hypertension.  · Mild to moderate tricuspid regurgitation.  · Mild pulmonic regurgitation.  · Normal central venous pressure (3 mmHg).  · The estimated PA systolic pressure is 47 mmHg.  · Trivial pericardial effusion.      Elevated troponin  Cardiology to see  Obtain prior left heart catheterization results    11/18 markedly elevated troponin today.  Doubt PE but check D-dimer.  Echocardiogram pending    Frequent falls  Physical therapy to see      Atrial fibrillation with RVR  Patient with new onset atrial fibrillation which is controlled currently with Beta Blocker. Patient is currently in atrial fibrillation.[id number] Score: 7. HASBLED Score: 3 ( moderate risk of bleeding in range of 3.7-6% per year). Anticoagulation indicated. Anticoagulation done with heparin.    Currently rate controlled.        Bilateral carotid artery stenosis  Outpatient follow-up      Takotsubo cardiomyopathy  Cardiology plans on returning patient to cath lab for further stenting on Monday 11/21/2022..      Hemiparesis  affecting left side as late effect of cerebrovascular accident (CVA)  Therapy team to see especially with history of recent falls.  Continue PT/OT.      Systolic hypertension    Adjust medications as needed    Type 2 diabetes mellitus, without long-term current use of insulin  Patient's FSGs are controlled on current medication regimen.  Last A1c reviewed-   Lab Results   Component Value Date    HGBA1C 7.7 (H) 06/22/2022     Most recent fingerstick glucose reviewed- No results for input(s): POCTGLUCOSE in the last 24 hours.  Current correctional scale  Medium  Maintain anti-hyperglycemic dose as follows-   Antihyperglycemics (From admission, onward)    Start     Stop Route Frequency Ordered    11/18/22 0900  pioglitazone tablet 45 mg         -- Oral Daily 11/17/22 1959 11/17/22 2054  insulin aspart U-100 injection 1-10 Units         -- SubQ Before meals & nightly PRN 11/17/22 1959        Hold Oral hypoglycemics while patient is in the hospital.      VTE Risk Mitigation (From admission, onward)         Ordered     enoxaparin injection 60 mg  Every 12 hours (non-standard times)         11/19/22 1246     Reason for No Pharmacological VTE Prophylaxis  Once        Question:  Reasons:  Answer:  Already adequately anticoagulated on oral Anticoagulants    11/17/22 1959     IP VTE HIGH RISK PATIENT  Once         11/17/22 1959     Place sequential compression device  Until discontinued         11/17/22 1959                Discharge Planning   TOM:      Code Status: Full Code   Is the patient medically ready for discharge?:     Reason for patient still in hospital (select all that apply): Treatment  Discharge Plan A: Home                  Adelaide Marshall MD  Department of Hospital Medicine   [facility]

## 2022-11-22 NOTE — ASSESSMENT & PLAN NOTE
Patient with new onset atrial fibrillation which is controlled currently with Beta Blocker. Patient is currently in atrial fibrillation.[id number] Score: 7. HASBLED Score: 3 ( moderate risk of bleeding in range of 3.7-6% per year). Anticoagulation indicated. Anticoagulation done with heparin.    Currently rate controlled.

## 2022-11-22 NOTE — ASSESSMENT & PLAN NOTE
Patient's FSGs are controlled on current medication regimen.  Last A1c reviewed-   Lab Results   Component Value Date    HGBA1C 7.7 (H) 06/22/2022     Most recent fingerstick glucose reviewed- No results for input(s): POCTGLUCOSE in the last 24 hours.  Current correctional scale  Medium  Maintain anti-hyperglycemic dose as follows-   Antihyperglycemics (From admission, onward)    Start     Stop Route Frequency Ordered    11/17/22 2054  insulin aspart U-100 injection 1-10 Units         -- SubQ Before meals & nightly PRN 11/17/22 1959        Hold Oral hypoglycemics while patient is in the hospital.

## 2022-11-22 NOTE — MEDICAL/APP STUDENT
[facility] - [facility] North Medical Telemetry  Progress Note    Patient Name: Erlinda Cuevas  MRN: [id number]  Patient Class: IP- Inpatient   Admission Date: 11/17/2022  Length of Stay: 5 days  Attending Physician: Adelaide Marshall MD  Primary Care Provider: Rigoberto Espinosa III, DO    Subjective:     Principal Problem: NSTEMI (non-ST elevated myocardial infarction)    Chief Complaint: chest pain    Interval History: [facility] yesterday with stents placed. She is resting comfortably in bed with no complaints at this time. She denies chest pain, SOB, palpitations, weakness and fevers.      Review of Systems   Constitutional:  Negative for activity change and fever.   Respiratory:  Negative for cough, chest tightness and shortness of breath.    Cardiovascular:  Negative for chest pain, palpitations and leg swelling.   Gastrointestinal:  Negative for abdominal pain, nausea and vomiting.   Musculoskeletal:  Negative for arthralgias.   Neurological:  Negative for dizziness and weakness.   Psychiatric/Behavioral:  Negative for agitation. The patient is not nervous/anxious.      Objective:     Vital Signs (Most Recent):  Temp: 98.5 °F (36.9 °C) (11/22/22 1035)  Pulse: (!) 59 (11/22/22 1035)  Resp: 18 (11/22/22 1035)  BP: (!) 100/56 (11/22/22 1035)  SpO2: 97 % (11/22/22 1035)   Vital Signs (24h Range):  Temp:  [97.6 °F (36.4 °C)-98.5 °F (36.9 °C)] 98.5 °F (36.9 °C)  Pulse:  [54-66] 59  Resp:  [16-18] 18  SpO2:  [95 %-97 %] 97 %  BP: (100-167)/(51-67) 100/56     Weight: 59.4 kg (131 lb)  Body mass index is 26.46 kg/m².    Intake/Output Summary (Last 24 hours) at 11/22/2022 1248  Last data filed at 11/22/2022 0530  Gross per 24 hour   Intake 350 ml   Output --   Net 350 ml      Physical Exam  Constitutional:       Appearance: Normal appearance.   HENT:      Head: Normocephalic and atraumatic.      Right Ear: External ear normal.      Left Ear: External ear normal.      Mouth/Throat:      Pharynx: Oropharynx is clear.    Eyes:      Pupils: Pupils are equal, round, and reactive to light.   Cardiovascular:      Rate and Rhythm: Normal rate.      Pulses: Normal pulses.      Heart sounds: Normal heart sounds.   Pulmonary:      Effort: Pulmonary effort is normal.      Breath sounds: Normal breath sounds.   Abdominal:      General: Abdomen is flat.      Palpations: Abdomen is soft.   Musculoskeletal:         General: Normal range of motion.      Cervical back: Normal range of motion.   Skin:     General: Skin is warm and dry.   Neurological:      General: No focal deficit present.      Mental Status: She is alert and oriented to person, place, and time.   Psychiatric:         Mood and Affect: Mood normal.         Thought Content: Thought content normal.       Significant Labs: All pertinent labs within the past 24 hours have been reviewed.    Significant Imaging: I have reviewed all pertinent imaging results/findings within the past 24 hours.        Assessment/Plan:      Brief HPI: A 76 yo female with a history of tak[place] cardiomyopathy, HTN, HLD, T2DM, CVA [2021], and AGATA without CPAP presented to the ED with chest pain. Yesterday morning, she woke with pain in the sternal area. The pain was achy, 4/10 and non-radiating. She had associated SOB,  nausea, vomiting x1 and diaphoresis. She states she has been experiencing similar chest pain on and off over the last several weeks.      In the ED, EKG showed atrial fibrillation with RVR in the 120s. No history of a-fib. She was administered diltiazem IV 20mg. She is rate controlled but remains in a-fib. Troponin is elevated, initially 122, then 383, and most recently 9247.   She is followed by Dr. Nuñez for nonischemic cardiomyopathy.     NSTEMI (non-ST elevated myocardial infarction)  Results for orders placed during the hospital encounter of 11/17/22    Cardiac catheterization    Conclusion    The Ost Cx to Prox Cx lesion was 90% stenosed with 0% stenosis post-intervention.    The  Prox LAD to Mid LAD lesion was 95% stenosed with 0% stenosis post-intervention.    The 1st Mrg lesion was 80% stenosed with 0% stenosis post-intervention.    A stent was successfully placed.    A stent was successfully placed.    A stent was successfully placed.    The estimated blood loss was none.    There was three vessel coronary artery disease.    The procedure log was documented by Documenter: RT Rola and verified by Mingo Denson DO.    Date: 11/21/2022  Time: 1:42 PM  Severe stenosis proximal Cx, OM1 undergoing successful PCI with DILMA x 4  Severe stenosis mid LAD undergoing successful PCI with DILMA x 1  Residual severe stenosis in Mid RCA, too small for intervention    11/22  Recommend continued management with GDMT  High intensity statin: not given due to allergy, continue zetia 10 mg  Beta blocker: Stop metoprolol tartrate and start toprol XL 50 mg  Anticoagulation for Afib: Start Eliquis 5 mg  Antiplatelet: Continue plavix 75 mg, no aspirin because on eliquis  Start lisinopril 20 mg and spirolactone 25 mg given EF 35%.  Continue imdur 30 mg  Follow up in outpatient cardiology clinic in 2 weeks with Nicole Stewart NP  Stop Actos due to contraindication in heart failure and start SGLT2 outpatient    Atrial fibrillation  Currently rate controlled  Start Eliquis 5 mg  Start Toprol XL 50 mg        Bilateral carotid artery stenosis  Outpatient follow-up     Takotsubo cardiomyopathy  Recommend continued management with GDMT  Stop lisinopril-HCTZ, Actos, metoprolol tartrate, and aspirin  Will continue imdur, plavix, lisinopril, zetia, toprol XL, eliquis and spirolatone.  Follow up in outpatient cardiology clinic in 2 weeks.  Consider starting SGLT2 outpatient    Results for orders placed during the hospital encounter of 11/17/22    Echo    Interpretation Summary  · The left ventricle is normal in size with concentric hypertrophy and moderately decreased systolic function.  · The estimated ejection  fraction is 35%.  · Left ventricular diastolic dysfunction.  · Atrial fibrillation not observed.  · Normal right ventricular size.  · Mild mitral regurgitation.  · There is pulmonary hypertension.  · Mild to moderate tricuspid regurgitation.  · Mild pulmonic regurgitation.  · Normal central venous pressure (3 mmHg).  · The estimated PA systolic pressure is 47 mmHg.  · Trivial pericardial effusion.       Hemiparesis affecting left side as late effect of cerebrovascular accident (CVA)  Therapy team to see especially with history of recent falls     Systolic hypertension  Stop lisinopril-HCTZ and metoprolol tartrate  Continue taking imdur, lisinopril, hydralazine, toprol XL and spirolactone     Type 2 diabetes mellitus, without long-term current use of insulin  Stop Actos due to contraindication in heart failure and start SGLT2 outpatient for DM management and cardioprotective properties      VTE Risk Mitigation (From admission, onward)           Ordered     apixaban tablet 5 mg  2 times daily         11/22/22 1249     Reason for No Pharmacological VTE Prophylaxis  Once        Question:  Reasons:  Answer:  Already adequately anticoagulated on oral Anticoagulants    11/17/22 1959     IP VTE HIGH RISK PATIENT  Once         11/17/22 1959     Place sequential compression device  Until discontinued         11/17/22 1959                       Madison White[facility] - [address] [place] [address]

## 2022-11-22 NOTE — ASSESSMENT & PLAN NOTE
Chest pain this AM relieved with one sl ntg, continue on heparin until completes revascularization.     11/21: plan for [facility] today.      D-dimer: 1.75  Lipid panel:   Lab Results   Component Value Date    CHOL 201 (H) 11/18/2022    CHOL 273 (H) 06/22/2022    CHOL 266 (H) 09/07/2021     Lab Results   Component Value Date    HDL 31 (L) 11/18/2022    HDL 31 (L) 06/22/2022    HDL 40 09/07/2021     Lab Results   Component Value Date    LDLCALC  06/22/2022      Comment:      Unable to calculate due to one of the following values:  Cholesterol <5  HDL Cholesterol <5  Triglycerides <10 or >400    LDLCALC  09/07/2021      Comment:      Unable to calculate due to one of the following values:  Cholesterol <5  HDL Cholesterol <5  Triglycerides <10 or >400     A1c:   Lab Results   Component Value Date    HGBA1C 7.7 (H) 06/22/2022     SANDRA Risk Score:      (age>65, ASA, 3 risk factors, known CAD, angina, ECG, troponin)  >5    Diagnostics: trend troponin, telemetry, echo if not recent, consider repeat ECG    Plan:   Oxygen  high intensity statin: Not done given allergy to statin   beta blocker: metoprolol   ASA: not done per cardiology but plavix.    Anticoagulation: lovenox   ACE-inhibitor and spironolactone given EF 35%.    Consulted cardiology and appreciate recommendations.     Cardiac rehab ( consult)  If applicable, educate on medication adherence, blood pressure control, stress reduction, cholesterol, tobacco use, weight management, diet, diabetes, physical activity      Current Facility-Administered Medications:     acetaminophen tablet 650 mg, 650 mg, Oral, Q4H PRN, Mingo Denson DO    apixaban tablet 5 mg, 5 mg, Oral, BID, MADDIE Jhaveri    clopidogreL tablet 75 mg, 75 mg, Oral, Daily, Lonnie Deleon MD, 75 mg at 11/22/22 0943    dextrose 10% bolus 125 mL, 12.5 g, Intravenous, PRN, Lonnie Deleon MD    dextrose 10% bolus 250 mL, 25 g, Intravenous, PRN, Lonnie Deleon MD    ezetimibe tablet 10  mg, 10 mg, Oral, Daily, Lonnie Deleon MD, 10 mg at 11/22/22 0942    glucagon (human recombinant) injection 1 mg, 1 mg, Intramuscular, PRN, Lonnie Deleon MD    glucose chewable tablet 16 g, 16 g, Oral, PRN, Lonnie Deleon MD    glucose chewable tablet 24 g, 24 g, Oral, PRN, Lonnie Deleon MD    hydrALAZINE tablet 25 mg, 25 mg, Oral, BID, Lonnie Deleon MD, 25 mg at 11/22/22 0942    insulin aspart U-100 injection 1-10 Units, 1-10 Units, Subcutaneous, QID (AC + HS) PRN, Lonnie Deleon MD, 2 Units at 11/22/22 0618    isosorbide mononitrate 24 hr tablet 30 mg, 30 mg, Oral, Daily, Mingo Denson DO, 30 mg at 11/22/22 0941    lisinopriL tablet 20 mg, 20 mg, Oral, BID, 20 mg at 11/22/22 0941 **AND** [DISCONTINUED] hydroCHLOROthiazide tablet 12.5 mg, 12.5 mg, Oral, BID, Lonnie Deleon MD, 12.5 mg at 11/21/22 0817    metoprolol succinate (TOPROL-XL) 24 hr tablet 50 mg, 50 mg, Oral, Daily, Bienvenido Morris MD, 50 mg at 11/22/22 0945    naloxone 0.4 mg/mL injection 0.02 mg, 0.02 mg, Intravenous, PRN, Lonnie Deleon MD    nitroGLYCERIN SL tablet 0.4 mg, 0.4 mg, Sublingual, Q5 Min PRN, Britni Sutton MD, 0.4 mg at 11/18/22 1910    ondansetron disintegrating tablet 8 mg, 8 mg, Oral, Q8H PRN, Mingo Denson DO    pantoprazole EC tablet 40 mg, 40 mg, Oral, Daily, Lonnie Deleon MD, 40 mg at 11/22/22 0943    paroxetine tablet 10 mg, 10 mg, Oral, QAM, Lonnie Deleon MD, 10 mg at 11/22/22 0618    promethazine tablet 25 mg, 25 mg, Oral, Q6H PRN, Lonnie Deleon MD    sodium chloride 0.9% flush 10 mL, 10 mL, Intravenous, Q12H PRN, Lonnie Deleon MD    spironolactone tablet 25 mg, 25 mg, Oral, Daily, Bienvenido Morris MD, 25 mg at 11/22/22 0942         Results for orders placed during the hospital encounter of 11/17/22    Echo    Interpretation Summary  · The left ventricle is normal in size with concentric hypertrophy and moderately decreased systolic function.  · The estimated ejection fraction is 35%.  ·  Left ventricular diastolic dysfunction.  · Atrial fibrillation not observed.  · Normal right ventricular size.  · Mild mitral regurgitation.  · There is pulmonary hypertension.  · Mild to moderate tricuspid regurgitation.  · Mild pulmonic regurgitation.  · Normal central venous pressure (3 mmHg).  · The estimated PA systolic pressure is 47 mmHg.  · Trivial pericardial effusion.    Patient refused cardiac rehab. Patient also stated that she does not have prescription insurance coverage and pays out of pocket for all her medications. States that she plans to not take recommended medications and continue her home medication regimen.

## 2022-11-29 ENCOUNTER — OFFICE VISIT (OUTPATIENT)
Dept: PRIMARY CARE CLINIC | Facility: CLINIC | Age: 77
End: 2022-11-29
Payer: MEDICARE

## 2022-11-29 VITALS
RESPIRATION RATE: 17 BRPM | HEART RATE: 56 BPM | WEIGHT: 131 LBS | HEIGHT: 59 IN | DIASTOLIC BLOOD PRESSURE: 70 MMHG | SYSTOLIC BLOOD PRESSURE: 144 MMHG | OXYGEN SATURATION: 100 % | BODY MASS INDEX: 26.41 KG/M2 | TEMPERATURE: 98 F

## 2022-11-29 DIAGNOSIS — I48.91 ATRIAL FIBRILLATION WITH RVR: ICD-10-CM

## 2022-11-29 DIAGNOSIS — I50.42 CHRONIC COMBINED SYSTOLIC AND DIASTOLIC HEART FAILURE: ICD-10-CM

## 2022-11-29 DIAGNOSIS — E11.9 TYPE 2 DIABETES MELLITUS WITHOUT COMPLICATION, WITHOUT LONG-TERM CURRENT USE OF INSULIN: ICD-10-CM

## 2022-11-29 DIAGNOSIS — I21.4 NSTEMI (NON-ST ELEVATED MYOCARDIAL INFARCTION): ICD-10-CM

## 2022-11-29 DIAGNOSIS — I10 PRIMARY HYPERTENSION: ICD-10-CM

## 2022-11-29 PROCEDURE — 99214 PR OFFICE/OUTPT VISIT, EST, LEVL IV, 30-39 MIN: ICD-10-PCS | Mod: ,,, | Performed by: FAMILY MEDICINE

## 2022-11-29 PROCEDURE — 99214 OFFICE O/P EST MOD 30 MIN: CPT | Mod: ,,, | Performed by: FAMILY MEDICINE

## 2022-11-29 RX ORDER — ASPIRIN 81 MG/1
81 TABLET ORAL EVERY OTHER DAY
COMMUNITY

## 2022-11-30 PROBLEM — R79.89 ELEVATED TROPONIN: Status: RESOLVED | Noted: 2022-11-18 | Resolved: 2022-11-30

## 2022-11-30 NOTE — ASSESSMENT & PLAN NOTE
Currently rate controlled with no complaints, appear to be in sinus rhythm on today's examination. Continue metoprolol and Eliquis.

## 2022-11-30 NOTE — ASSESSMENT & PLAN NOTE
Medications optimized during recent hospitalization. Continue current therapy and follow up with cardiology.     Educated on importance of medical compliance and blood pressure control.

## 2022-11-30 NOTE — ASSESSMENT & PLAN NOTE
[facility] 11/21/2022 with stents to LAD and Cx, patient was not a candidate for CABG although this was considered. Denies any further chest pain or shortness of breath. States she is fatigued but this is slowly improving. Patient refused cardiac rehab on discharge.    Continue ASA and Plavix, no statin due to allergy. Follow up with cardiology as scheduled.

## 2022-11-30 NOTE — ASSESSMENT & PLAN NOTE
Blood pressure reasonably well controlled on current medications. Patient states her SBP before recent admission was usually >160. She does not want blood pressure to be any lower than it is today as she feels having too many blood pressure medications contributed to her recent chest pain.     Educated patient on the importance of blood pressure control considering her cardiomyopathy and CAD w/recent PCI, however will defer medication changes until her follow up with cardiology.

## 2022-11-30 NOTE — ASSESSMENT & PLAN NOTE
Recent Hgb A1c, patient reports readings at home as high as 240.     Actos discontinued during recent admission due to heart failure. Recommended Ozempic or Rybelsus for better control and cardiac benefit, however patient states she cannot afford these medications as she has no prescription coverage. Offered sample of Ozempic, patient refused as she does not want to do any injections. Sample of Rybelsus (0.3 mg daily) provided, patient unsure if she will take it.

## 2022-11-30 NOTE — PROGRESS NOTES
History:     Patient ID: Erlinda Cuevas is a 77 y.o. female.    Chief Complaint: Transitional Care    Erlinda Cuevas is a 77 y.o. female who presents to clinic for follow up of recent hospitalization. She was admitted to [facility] on 11/17/2022 for chest pain and elevated troponin. The following pertinent tests or procedures were completed during admission: LHC (multi-vessel disease with stents placed), echocardiogram (EF 35% with pulmonary hypertension and valvular regurgitation) . She was discharged home to self care. She has no acute complaints or concerns at this time and is tolerating all medications well with no side effects.      Discharge diagnosis: NSTEMI  Plan of care: Medications optimized on discharge, cardiac rehab offered but refused  Follow up: PCP, cardiology      Transitional Care Note    Family and/or Caretaker present at visit?  Yes.  Diagnostic tests reviewed/disposition: No diagnosic tests pending after this hospitalization.  Disease/illness education: Yes, verbal education provided.  Home health/community services discussion/referrals: Patient does not have home health established from hospital visit.  They are not interested in services at this time, but medical opinion is that due to chronic conditions and lack of transport they need home health.  If needed, we will set up home health for the patient.   Establishment or re-establishment of referral orders for community resources: No other necessary community resources.   Discussion with other health care providers: No discussion with other health care providers necessary.     Discharge medications reviewed and reconciled:  Discharged medications reconciled with the current medications and No changes in medication since discharge    Strongly encouraged home health as patient's son lives in [place]. He will be going home in a few days leaving her with no reliable transportation. Patient refused, stating she does not  want to be bothered by people coming to the house and that she has neighbors she can call if needed.       Health Maintenance:  The following health maintenance was discussed with Erlinda Cuevas.  None - TCC visit    Remaining care gaps will be addressed during future visits.      Current Outpatient Medications:     apixaban (ELIQUIS) 5 mg Tab, Take 1 tablet (5 mg total) by mouth 2 (two) times daily., Disp: 60 tablet, Rfl: 0    aspirin (ECOTRIN) 81 MG EC tablet, Take 81 mg by mouth once daily., Disp: , Rfl:     clopidogreL (PLAVIX) 75 mg tablet, Take 1 tablet (75 mg total) by mouth once daily., Disp: 90 tablet, Rfl: 3    isosorbide mononitrate (IMDUR) 30 MG 24 hr tablet, Take 1 tablet (30 mg total) by mouth once daily., Disp: 30 tablet, Rfl: 0    lisinopriL (PRINIVIL,ZESTRIL) 20 MG tablet, Take 1 tablet (20 mg total) by mouth 2 (two) times a day., Disp: 60 tablet, Rfl: 0    metFORMIN (GLUCOPHAGE) 500 MG tablet, Take 2 tablets (1,000 mg total) by mouth 2 (two) times daily with meals. Take 2 tabs twice daily, Disp: 360 tablet, Rfl: 3    metoprolol succinate (TOPROL-XL) 50 MG 24 hr tablet, Take 1 tablet (50 mg total) by mouth once daily., Disp: 30 tablet, Rfl: 0    paroxetine (PAXIL) 10 MG tablet, Take 1 tablet (10 mg total) by mouth every morning., Disp: 90 tablet, Rfl: 3    spironolactone (ALDACTONE) 25 MG tablet, Take 1 tablet (25 mg total) by mouth once daily., Disp: 30 tablet, Rfl: 0    vitamin E 400 UNIT capsule, Take 400 Units by mouth once daily., Disp: , Rfl:     clindamycin (CLEOCIN T) 1 % lotion, APPLY TOPICALLY TWICE DAILY TO THE AFFECTED AREA(S) OF BOTH ARMPITS, Disp: , Rfl:     ezetimibe (ZETIA) 10 mg tablet, Take 1 tablet (10 mg total) by mouth once daily., Disp: 90 tablet, Rfl: 3    hydrALAZINE (APRESOLINE) 25 MG tablet, Take 1 tablet (25 mg total) by mouth 2 (two) times daily., Disp: 180 tablet, Rfl: 4    Review of patient's allergies indicates:   Allergen Reactions    Pravastatin Rash        Past Medical History:   Diagnosis Date    Benign paroxysmal vertigo     Cardiomyopathy     Hypertension     Pulmonary HTN     Stroke     affected balance     Takotsubo cardiomyopathy     Valvular regurgitation        Past Surgical History:   Procedure Laterality Date    ANGIOGRAM, CORONARY, WITH LEFT HEART CATHETERIZATION N/A 2022    Procedure: Angiogram, Coronary, with Left Heart Cath;  Surgeon: Mingo Denson DO;  Location: [facility] CATH LAB;  Service: Cardiology;  Laterality: N/A;    CATARACT EXTRACTION W/ INTRAOCULAR LENS  IMPLANT, BILATERAL      CORONARY ANGIOGRAPHY INCLUDING BYPASS GRAFTS WITH CATHETERIZATION OF LEFT HEART N/A 2022    Procedure: ANGIOGRAM, CORONARY, INCLUDING BYPASS GRAFT, WITH LEFT HEART CATHETERIZATION;  Surgeon: Mingo Denson DO;  Location: [facility] CATH LAB;  Service: Cardiology;  Laterality: N/A;    EYE SURGERY      HYSTERECTOMY         Family History   Problem Relation Age of Onset    Cancer Mother     Heart disease Father     Heart disease Sister     Diabetes Sister     Cancer Brother        Social History     Tobacco Use    Smoking status: Former     Types: Cigarettes     Quit date:      Years since quittin.9    Smokeless tobacco: Never   Substance Use Topics    Alcohol use: Never    Drug use: Never       Subjective:     Review of Systems   Constitutional:  Positive for fatigue. Negative for activity change, chills, diaphoresis and fever.   HENT:  Negative for nasal congestion, hearing loss and sore throat.    Eyes:  Negative for visual disturbance.   Respiratory:  Negative for cough, chest tightness and shortness of breath.    Cardiovascular:  Negative for chest pain and palpitations.   Gastrointestinal:  Negative for abdominal pain, diarrhea, nausea and vomiting.   Endocrine: Positive for polydipsia. Negative for polyuria.   Genitourinary:  Negative for difficulty urinating, dysuria and hematuria.   Musculoskeletal:  Positive for gait problem (frequent  "falls). Negative for arthralgias and myalgias.   Integumentary:  Negative for rash and wound.   Neurological:  Positive for tremors (intermittent - established with neuro). Negative for dizziness, weakness and headaches.   Psychiatric/Behavioral:  Negative for confusion, decreased concentration, dysphoric mood and sleep disturbance.    All other systems reviewed and are negative.        Objective:        Vitals:    11/29/22 0940   BP: (!) 144/70   BP Location: Right arm   Patient Position: Sitting   BP Method: Large (Manual)   Pulse: (!) 56   Resp: 17   Temp: 98 °F (36.7 °C)   TempSrc: Oral   SpO2: 100%   Weight: 59.4 kg (131 lb)   Height: 4' 11" (1.499 m)       Physical Exam  Constitutional:       General: She is not in acute distress.     Appearance: Normal appearance. She is not ill-appearing.   HENT:      Head: Normocephalic and atraumatic.      Nose: Nose normal.   Eyes:      Conjunctiva/sclera: Conjunctivae normal.      Pupils: Pupils are equal, round, and reactive to light.   Cardiovascular:      Rate and Rhythm: Regular rhythm. Bradycardia present.      Pulses: Normal pulses.   Pulmonary:      Effort: Pulmonary effort is normal. No respiratory distress.      Breath sounds: Normal breath sounds.   Abdominal:      General: There is no distension.      Palpations: Abdomen is soft.      Tenderness: There is no abdominal tenderness.   Musculoskeletal:         General: Normal range of motion.      Cervical back: No rigidity.      Right lower leg: No edema.      Left lower leg: No edema.   Skin:     General: Skin is warm.      Coloration: Skin is not jaundiced or pale.      Findings: No rash.   Neurological:      General: No focal deficit present.      Mental Status: She is alert and oriented to person, place, and time. Mental status is at baseline.   Psychiatric:         Mood and Affect: Mood normal.         Behavior: Behavior normal.         Thought Content: Thought content normal.         Judgment: Judgment " normal.         Assessment         1. Primary hypertension    2. Atrial fibrillation with RVR    3. NSTEMI (non-ST elevated myocardial infarction)    4. Chronic combined systolic and diastolic heart failure    5. Type 2 diabetes mellitus without complication, without long-term current use of insulin          Plan:         1. Primary hypertension  Assessment & Plan:  Blood pressure reasonably well controlled on current medications. Patient states her SBP before recent admission was usually >160. She does not want blood pressure to be any lower than it is today as she feels having too many blood pressure medications contributed to her recent chest pain.     Educated patient on the importance of blood pressure control considering her cardiomyopathy and CAD w/recent PCI, however will defer medication changes until her follow up with cardiology.       2. Atrial fibrillation with RVR  Assessment & Plan:  Currently rate controlled with no complaints, appear to be in sinus rhythm on today's examination. Continue metoprolol and Eliquis.      3. NSTEMI (non-ST elevated myocardial infarction)  Overview:       Assessment & Plan:  [facility] 11/21/2022 with stents to LAD and Cx, patient was not a candidate for CABG although this was considered. Denies any further chest pain or shortness of breath. States she is fatigued but this is slowly improving. Patient refused cardiac rehab on discharge.    Continue ASA and Plavix, no statin due to allergy. Follow up with cardiology as scheduled.      4. Chronic combined systolic and diastolic heart failure  Overview:  Echo 11/17/22:   The estimated ejection fraction is 35%.  Left ventricular diastolic dysfunction.  Mild mitral regurgitation.  There is pulmonary hypertension.  Mild to moderate tricuspid regurgitation.  Mild pulmonic regurgitation.  Normal central venous pressure (3 mmHg).  The estimated PA systolic pressure is 47 mmHg.        Assessment & Plan:  Medications optimized during recent  hospitalization. Continue current therapy and follow up with cardiology.     Educated on importance of medical compliance and blood pressure control.       5. Type 2 diabetes mellitus without complication, without long-term current use of insulin  Assessment & Plan:  Recent Hgb A1c, patient reports readings at home as high as 240.     Actos discontinued during recent admission due to heart failure. Recommended Ozempic or Rybelsus for better control and cardiac benefit, however patient states she cannot afford these medications as she has no prescription coverage. Offered sample of Ozempic, patient refused as she does not want to do any injections. Sample of Rybelsus (0.3 mg daily) provided, patient unsure if she will take it.         Follow up in about 3 months (around 2/28/2023).    Mima Sierra, DO

## 2022-12-05 ENCOUNTER — TELEPHONE (OUTPATIENT)
Dept: PRIMARY CARE CLINIC | Facility: CLINIC | Age: 77
End: 2022-12-05
Payer: MEDICARE

## 2022-12-05 DIAGNOSIS — R30.0 DYSURIA: Primary | ICD-10-CM

## 2022-12-05 RX ORDER — NITROFURANTOIN 25; 75 MG/1; MG/1
100 CAPSULE ORAL 2 TIMES DAILY
Qty: 10 CAPSULE | Refills: 0 | Status: SHIPPED | OUTPATIENT
Start: 2022-12-05 | End: 2022-12-10

## 2022-12-12 NOTE — PROGRESS NOTES
Subjective:       Patient ID: Erlinda Cuevas is a 77 y.o. female     Chief Complaint:    Chief Complaint   Patient presents with    Follow-up    Stroke        Allergies:  Pravastatin    Current Medications:    Outpatient Encounter Medications as of 2022   Medication Sig Dispense Refill    apixaban (ELIQUIS) 5 mg Tab Take 1 tablet (5 mg total) by mouth 2 (two) times daily. 60 tablet 0    aspirin (ECOTRIN) 81 MG EC tablet Take 81 mg by mouth once daily.      clopidogreL (PLAVIX) 75 mg tablet Take 1 tablet (75 mg total) by mouth once daily. 90 tablet 3    isosorbide mononitrate (IMDUR) 30 MG 24 hr tablet Take 1 tablet (30 mg total) by mouth once daily. 30 tablet 0    lisinopriL (PRINIVIL,ZESTRIL) 20 MG tablet Take 1 tablet (20 mg total) by mouth 2 (two) times a day. 60 tablet 0    metFORMIN (GLUCOPHAGE) 500 MG tablet Take 2 tablets (1,000 mg total) by mouth 2 (two) times daily with meals. Take 2 tabs twice daily 360 tablet 3    metoprolol succinate (TOPROL-XL) 50 MG 24 hr tablet Take 1 tablet (50 mg total) by mouth once daily. 30 tablet 0    paroxetine (PAXIL) 10 MG tablet Take 1 tablet (10 mg total) by mouth every morning. 90 tablet 3    spironolactone (ALDACTONE) 25 MG tablet Take 1 tablet (25 mg total) by mouth once daily. 30 tablet 0    vitamin E 400 UNIT capsule Take 400 Units by mouth once daily.      clindamycin (CLEOCIN T) 1 % lotion APPLY TOPICALLY TWICE DAILY TO THE AFFECTED AREA(S) OF BOTH ARMPITS      ezetimibe (ZETIA) 10 mg tablet Take 1 tablet (10 mg total) by mouth once daily. (Patient not taking: Reported on 2022) 90 tablet 3    hydrALAZINE (APRESOLINE) 25 MG tablet Take 1 tablet (25 mg total) by mouth 2 (two) times daily. (Patient not taking: Reported on 2022) 180 tablet 4    [] nitrofurantoin, macrocrystal-monohydrate, (MACROBID) 100 MG capsule Take 1 capsule (100 mg total) by mouth 2 (two) times daily. for 5 days 10 capsule 0    [DISCONTINUED]  lisinopriL-hydrochlorothiazide (PRINZIDE,ZESTORETIC) 20-12.5 mg per tablet Take 1 tablet by mouth 2 (two) times a day. 180 tablet 3    [DISCONTINUED] metoprolol tartrate (LOPRESSOR) 100 MG tablet Take 1 tablet (100 mg total) by mouth 2 (two) times daily. 180 tablet 3    [DISCONTINUED] pioglitazone (ACTOS) 45 MG tablet Take 1 tablet (45 mg total) by mouth once daily. 90 tablet 3    [DISCONTINUED] pravastatin (PRAVACHOL) 20 MG tablet Take 1 tablet (20 mg total) by mouth once daily. 90 tablet 3    [DISCONTINUED] acetaminophen tablet 650 mg       [DISCONTINUED] acetaminophen tablet 650 mg       [DISCONTINUED] acetaminophen tablet 650 mg       [DISCONTINUED] clopidogreL tablet 75 mg       [DISCONTINUED] dextrose 10% bolus 125 mL       [DISCONTINUED] dextrose 10% bolus 250 mL       [DISCONTINUED] diltiaZEM tablet 30 mg       [DISCONTINUED] enoxaparin injection 60 mg       [DISCONTINUED] ezetimibe tablet 10 mg       [DISCONTINUED] glucagon (human recombinant) injection 1 mg       [DISCONTINUED] glucose chewable tablet 16 g       [DISCONTINUED] glucose chewable tablet 24 g       [DISCONTINUED] heparin 25,000 units in dextrose 5% (100 units/ml) IV bolus from bag - ADDITIONAL PRN BOLUS - 30 units/kg       [DISCONTINUED] heparin 25,000 units in dextrose 5% (100 units/ml) IV bolus from bag - ADDITIONAL PRN BOLUS - 60 units/kg       [DISCONTINUED] heparin 25,000 units in dextrose 5% 250 mL (100 units/mL) infusion LOW INTENSITY nomogram - [facility]       [DISCONTINUED] hydrALAZINE tablet 25 mg       [DISCONTINUED] hydroCHLOROthiazide tablet 12.5 mg       [DISCONTINUED] insulin aspart U-100 injection 1-10 Units       [DISCONTINUED] isosorbide mononitrate 24 hr tablet 30 mg       [DISCONTINUED] lisinopriL tablet 20 mg       [DISCONTINUED] metoprolol tartrate (LOPRESSOR) tablet 100 mg       [DISCONTINUED] naloxone 0.4 mg/mL injection 0.02 mg       [DISCONTINUED] nitroGLYCERIN SL tablet 0.4 mg       [DISCONTINUED] ondansetron  disintegrating tablet 8 mg       [DISCONTINUED] ondansetron disintegrating tablet 8 mg       [DISCONTINUED] ondansetron injection 4 mg       [DISCONTINUED] pantoprazole EC tablet 40 mg       [DISCONTINUED] paroxetine tablet 10 mg       [DISCONTINUED] pioglitazone tablet 45 mg       [DISCONTINUED] potassium chloride SA CR tablet 20 mEq       [DISCONTINUED] promethazine tablet 25 mg       [DISCONTINUED] sodium chloride 0.9% flush 10 mL       [DISCONTINUED] spironolactone tablet 25 mg        No facility-administered encounter medications on file as of 12/13/2022.       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    At last visit was still on duel therapy as she had not followed up.  We d/c the ASA and continued plavix.  She was prior not able to tolerate statin treatment per her report, but she was willing to try pravastatin instead last visit.  Her primary care note last reported now on eliquis 5mg bid, ASA, and the plavix?  Apparently she had to have x5 stents placed in November per Dr. Denson.  She was put back on ASA 81mg then, but was told she also had A-fib, thus Eliquis was started as well.  I have discussed with Dr. Perry, patient is a fall risk due to her prior CVAs.  With A-fib we certainly agree with Eliquis as CVA prevention.  She does not from neurology standpoint need to stay on ASA and plavix for stroke prevention.  However, if cardiology sees good reason that she needs to continue on them then we agree with their guidance on what needs to be done to prevent complications from her recent stents.    Prior evaluated by Dr. Noel for noted bilateral 60% stenosis on CTA neck, but she had not followed with him at her last visit.    Noted also primary care reported in past her diabetes was not well maintained.      She had several stents placed in November 2022 when she presented  with chest pain, her EF was noted 35%, followed by Nicole Stewart NP.         Review of Systems  Review of Systems   Constitutional:  Negative for diaphoresis and fever.   HENT:  Negative for congestion, hearing loss and tinnitus.    Eyes:  Negative for blurred vision, double vision, photophobia, discharge and redness.   Respiratory:  Negative for cough and shortness of breath.    Cardiovascular:  Negative for chest pain.   Gastrointestinal:  Negative for abdominal pain, nausea and vomiting.   Musculoskeletal:  Negative for back pain, joint pain, myalgias and neck pain.   Skin:  Negative for itching and rash.   Neurological:  Negative for dizziness, tremors, sensory change, speech change, focal weakness, seizures, loss of consciousness, weakness and headaches.   Psychiatric/Behavioral:  Negative for depression, hallucinations and memory loss. The patient does not have insomnia.    All other systems reviewed and are negative.   Objective:     NEUROLOGICAL EXAMINATION:     MENTAL STATUS   Oriented to person, place, and time.   Registration: recalls 3 of 3 objects. Recall at 5 minutes: recalls 3 of 3 objects.   Attention: normal. Concentration: normal.   Speech: speech is normal   Level of consciousness: alert  Knowledge: good and consistent with education.   Normal comprehension.     CRANIAL NERVES     CN II   Visual fields full to confrontation.   Visual acuity: normal  Right visual field deficit: none  Left visual field deficit: none     CN III, IV, VI   Pupils are equal, round, and reactive to light.  Extraocular motions are normal.   Right pupil: Size: 3 mm. Shape: regular. Reactivity: brisk. Consensual response: intact. Accommodation: intact.   Left pupil: Size: 3 mm. Shape: regular. Reactivity: brisk. Consensual response: intact. Accommodation: intact.   CN III: no CN III palsy  CN VI: no CN VI palsy  Nystagmus: none   Diplopia: none  Upgaze: normal  Downgaze: normal  Conjugate gaze: present  Vestibulo-ocular  reflex: present    CN V   Facial sensation intact.   Right facial sensation deficit: none  Left facial sensation deficit: none  Right corneal reflex: normal  Left corneal reflex: normal    CN VII   Facial expression full, symmetric.   Right facial weakness: none  Left facial weakness: none  Right taste: normal  Left taste: normal    CN VIII   CN VIII normal.   Hearing: intact    CN IX, X   CN IX normal.   CN X normal.   Palate: symmetric    CN XI   CN XI normal.   Right sternocleidomastoid strength: normal  Left sternocleidomastoid strength: normal  Right trapezius strength: normal  Left trapezius strength: normal    CN XII   CN XII normal.   Tongue: not atrophic  Fasciculations: absent  Tongue deviation: none    MOTOR EXAM   Muscle bulk: normal  Overall muscle tone: normal  Right arm tone: normal  Left arm tone: normal  Right arm pronator drift: absent  Left arm pronator drift: absent  Right leg tone: normal  Left leg tone: normal    Strength   Right neck flexion: 5/5  Left neck flexion: 5/5  Right neck extension: 5/5  Left neck extension: 5/5  Right deltoid: 5/5  Left deltoid: 5/5  Right biceps: 5/5  Left biceps: 5/5  Right triceps: 5/5  Left triceps: 5/5  Right wrist flexion: 5/5  Left wrist flexion: 5/5  Right wrist extension: 5/5  Left wrist extension: 5/5  Right interossei: 5/5  Left interossei: 5/5  Right iliopsoas: 5/5  Left iliopsoas: 5/5  Right quadriceps: 5/5  Left quadriceps: 5/5  Right hamstrin/5  Left hamstrin/5  Right anterior tibial: 5/5  Left anterior tibial: 5/5  Right posterior tibial: 5/5  Left posterior tibial: 5/5  Right gastroc: 5/5  Left gastroc: 5/5    REFLEXES     Reflexes   Right brachioradialis: 2+  Left brachioradialis: 2+  Right biceps: 2+  Left biceps: 2+  Right triceps: 2+  Left triceps: 2+  Right patellar: 2+  Left patellar: 2+  Right achilles: 2+  Left achilles: 2+  Right plantar: normal  Left plantar: normal  Right Correa: absent  Left Correa: absent  Right ankle clonus:  absent  Left ankle clonus: absent  Right pendular knee jerk: absent  Left pendular knee jerk: absent    SENSORY EXAM   Light touch normal.   Right arm light touch: normal  Left arm light touch: normal  Right leg light touch: normal  Left leg light touch: normal  Vibration normal.   Right arm vibration: normal  Left arm vibration: normal  Right leg vibration: normal  Left leg vibration: normal  Proprioception normal.   Right arm proprioception: normal  Left arm proprioception: normal  Right leg proprioception: normal  Left leg proprioception: normal  Pinprick normal.   Right arm pinprick: normal  Left arm pinprick: normal  Right leg pinprick: normal  Left leg pinprick: normal  Graphesthesia: normal  Romberg: negative  Stereognosis: normal    GAIT AND COORDINATION     Gait  Gait: normal     Coordination   Finger to nose coordination: normal  Heel to shin coordination: normal  Tandem walking coordination: normal    Tremor   Resting tremor: absent  Intention tremor: absent  Action tremor: absent     Physical Exam  Vitals and nursing note reviewed.   Constitutional:       Appearance: Normal appearance.   HENT:      Head: Normocephalic.   Eyes:      Extraocular Movements: Extraocular movements intact and EOM normal.      Pupils: Pupils are equal, round, and reactive to light.   Cardiovascular:      Rate and Rhythm: Normal rate and regular rhythm.   Pulmonary:      Effort: Pulmonary effort is normal.      Breath sounds: Normal breath sounds.   Musculoskeletal:         General: No swelling or tenderness. Normal range of motion.      Cervical back: Normal range of motion and neck supple.      Right lower leg: No edema.      Left lower leg: No edema.   Skin:     General: Skin is warm and dry.      Coloration: Skin is not jaundiced.      Findings: No rash.   Neurological:      General: No focal deficit present.      Mental Status: She is alert and oriented to person, place, and time. Mental status is at baseline.      GCS: GCS  eye subscore is 4. GCS verbal subscore is 5. GCS motor subscore is 6.      Cranial Nerves: No cranial nerve deficit.      Sensory: No sensory deficit.      Motor: Motor function is intact. No weakness.      Coordination: Coordination is intact. Coordination normal. Finger-Nose-Finger Test, Heel to Shin Test and Romberg Test normal.      Gait: Gait is intact. Gait and tandem walk normal.      Deep Tendon Reflexes: Reflexes normal.      Reflex Scores:       Tricep reflexes are 2+ on the right side and 2+ on the left side.       Bicep reflexes are 2+ on the right side and 2+ on the left side.       Brachioradialis reflexes are 2+ on the right side and 2+ on the left side.       Patellar reflexes are 2+ on the right side and 2+ on the left side.       Achilles reflexes are 2+ on the right side and 2+ on the left side.  Psychiatric:         Mood and Affect: Mood normal.         Speech: Speech normal.         Behavior: Behavior normal.        Assessment:     Problem List Items Addressed This Visit          Neuro    Cerebrovascular accident (CVA) (Chronic)    Hemiparesis affecting left side as late effect of cerebrovascular accident (CVA)       Cardiac/Vascular    Hypertension    Hyperlipidemia    Bilateral carotid artery stenosis    Atrial fibrillation with RVR - Primary          Primary Diagnosis and ICD10  Atrial fibrillation with RVR [I48.91]    Plan:     Patient Instructions   1. Currently on Aspirin, plavix, and eliquis    2. Will discuss with cardiology to see if they feel she needs to continue the plavix    3. Declines physical therapy, but we can start at anytime if patient needs    4. Keep followup with primary care    Stroke risk modifiers:    1. Good sleep habits, recommend at least 7 hours total sleep daily  2. Continue management of blood pressure and cholesterol including medications, exercise, and good eating habits  3. Exercise as much as possible, recommend 5 days of the week for 30 minutes each day  4.  Avoid smoking and alcohol  5. Go to the nearest emergency department immediately if any new or worsening weakness, speech difficulty, or numbness    There are no discontinued medications.      Requested Prescriptions      No prescriptions requested or ordered in this encounter       No orders of the defined types were placed in this encounter.

## 2022-12-13 ENCOUNTER — OFFICE VISIT (OUTPATIENT)
Dept: NEUROLOGY | Facility: CLINIC | Age: 77
End: 2022-12-13
Payer: MEDICARE

## 2022-12-13 ENCOUNTER — OFFICE VISIT (OUTPATIENT)
Dept: CARDIOLOGY | Facility: CLINIC | Age: 77
End: 2022-12-13
Payer: MEDICARE

## 2022-12-13 VITALS
HEIGHT: 59 IN | DIASTOLIC BLOOD PRESSURE: 60 MMHG | OXYGEN SATURATION: 98 % | WEIGHT: 127.63 LBS | HEART RATE: 62 BPM | SYSTOLIC BLOOD PRESSURE: 124 MMHG | BODY MASS INDEX: 25.73 KG/M2

## 2022-12-13 VITALS
RESPIRATION RATE: 18 BRPM | HEIGHT: 59 IN | BODY MASS INDEX: 26.61 KG/M2 | HEART RATE: 58 BPM | OXYGEN SATURATION: 98 % | DIASTOLIC BLOOD PRESSURE: 80 MMHG | SYSTOLIC BLOOD PRESSURE: 122 MMHG | WEIGHT: 132 LBS

## 2022-12-13 DIAGNOSIS — I10 HYPERTENSION, UNSPECIFIED TYPE: Primary | ICD-10-CM

## 2022-12-13 DIAGNOSIS — I48.0 PAROXYSMAL ATRIAL FIBRILLATION: ICD-10-CM

## 2022-12-13 DIAGNOSIS — I50.42 CHRONIC COMBINED SYSTOLIC AND DIASTOLIC HEART FAILURE: ICD-10-CM

## 2022-12-13 DIAGNOSIS — I63.9 CEREBROVASCULAR ACCIDENT (CVA), UNSPECIFIED MECHANISM: ICD-10-CM

## 2022-12-13 DIAGNOSIS — I25.83 CORONARY ARTERY DISEASE DUE TO LIPID RICH PLAQUE: ICD-10-CM

## 2022-12-13 DIAGNOSIS — I48.91 ATRIAL FIBRILLATION WITH RVR: ICD-10-CM

## 2022-12-13 DIAGNOSIS — Z79.899 HIGH RISK MEDICATION USE: ICD-10-CM

## 2022-12-13 DIAGNOSIS — I21.4 NSTEMI (NON-ST ELEVATED MYOCARDIAL INFARCTION): ICD-10-CM

## 2022-12-13 DIAGNOSIS — I63.9 CEREBROVASCULAR ACCIDENT (CVA), UNSPECIFIED MECHANISM: Primary | Chronic | ICD-10-CM

## 2022-12-13 DIAGNOSIS — I69.354 HEMIPARESIS AFFECTING LEFT SIDE AS LATE EFFECT OF CEREBROVASCULAR ACCIDENT (CVA): ICD-10-CM

## 2022-12-13 DIAGNOSIS — E78.5 HYPERLIPIDEMIA, UNSPECIFIED HYPERLIPIDEMIA TYPE: ICD-10-CM

## 2022-12-13 DIAGNOSIS — I65.23 BILATERAL CAROTID ARTERY STENOSIS: ICD-10-CM

## 2022-12-13 DIAGNOSIS — I10 PRIMARY HYPERTENSION: ICD-10-CM

## 2022-12-13 DIAGNOSIS — I25.10 CORONARY ARTERY DISEASE DUE TO LIPID RICH PLAQUE: ICD-10-CM

## 2022-12-13 PROCEDURE — 99214 PR OFFICE/OUTPT VISIT, EST, LEVL IV, 30-39 MIN: ICD-10-PCS | Mod: S$PBB,,, | Performed by: NURSE PRACTITIONER

## 2022-12-13 PROCEDURE — 99212 OFFICE O/P EST SF 10 MIN: CPT | Mod: S$PBB,,, | Performed by: NURSE PRACTITIONER

## 2022-12-13 PROCEDURE — 99214 OFFICE O/P EST MOD 30 MIN: CPT | Mod: S$PBB,,, | Performed by: NURSE PRACTITIONER

## 2022-12-13 PROCEDURE — 99215 OFFICE O/P EST HI 40 MIN: CPT | Mod: PBBFAC | Performed by: NURSE PRACTITIONER

## 2022-12-13 PROCEDURE — 99213 OFFICE O/P EST LOW 20 MIN: CPT | Mod: PBBFAC | Performed by: NURSE PRACTITIONER

## 2022-12-13 PROCEDURE — 99212 PR OFFICE/OUTPT VISIT, EST, LEVL II, 10-19 MIN: ICD-10-PCS | Mod: S$PBB,,, | Performed by: NURSE PRACTITIONER

## 2022-12-13 RX ORDER — METOPROLOL SUCCINATE 50 MG/1
50 TABLET, EXTENDED RELEASE ORAL DAILY
Qty: 90 TABLET | Refills: 3 | Status: SHIPPED | OUTPATIENT
Start: 2022-12-13 | End: 2023-11-07

## 2022-12-13 RX ORDER — CLOPIDOGREL BISULFATE 75 MG/1
75 TABLET ORAL DAILY
Qty: 90 TABLET | Refills: 3 | Status: SHIPPED | OUTPATIENT
Start: 2022-12-13 | End: 2023-10-16

## 2022-12-13 RX ORDER — ISOSORBIDE MONONITRATE 30 MG/1
30 TABLET, EXTENDED RELEASE ORAL DAILY
Qty: 90 TABLET | Refills: 3 | Status: SHIPPED | OUTPATIENT
Start: 2022-12-13 | End: 2023-11-07

## 2022-12-13 RX ORDER — LISINOPRIL 20 MG/1
20 TABLET ORAL 2 TIMES DAILY
Qty: 180 TABLET | Refills: 3 | Status: SHIPPED | OUTPATIENT
Start: 2022-12-13 | End: 2023-11-07

## 2022-12-13 RX ORDER — SPIRONOLACTONE 25 MG/1
25 TABLET ORAL DAILY
Qty: 90 TABLET | Refills: 3 | Status: SHIPPED | OUTPATIENT
Start: 2022-12-13 | End: 2023-11-07

## 2022-12-13 NOTE — PATIENT INSTRUCTIONS
1. Currently on Aspirin, plavix, and eliquis    2. Will discuss with cardiology to see if they feel she needs to continue the plavix    3. Declines physical therapy, but we can start at anytime if patient needs    4. Keep followup with primary care    Stroke risk modifiers:    1. Good sleep habits, recommend at least 7 hours total sleep daily  2. Continue management of blood pressure and cholesterol including medications, exercise, and good eating habits  3. Exercise as much as possible, recommend 5 days of the week for 30 minutes each day  4. Avoid smoking and alcohol  5. Go to the nearest emergency department immediately if any new or worsening weakness, speech difficulty, or numbness

## 2022-12-17 PROBLEM — I48.0 PAROXYSMAL ATRIAL FIBRILLATION: Status: ACTIVE | Noted: 2022-12-17

## 2022-12-17 NOTE — ASSESSMENT & PLAN NOTE
"11/17/2022  Tolerating ace/beta blocker   Patient has been resistent to the idea of taking statins for years. She tried pravatstain and "had a rash".  "

## 2022-12-17 NOTE — ASSESSMENT & PLAN NOTE
LVDD and decreased LVSF (LVEF 35%) by echo 11/18/2022  Tolerating lisinopril and metoprolol XL without apparent issues  Sh did not bring the aldactone with her today. It is not clear if she is taking it or not.Complaince with meds and treatment plan has always been an issue.

## 2022-12-17 NOTE — PROGRESS NOTES
"[facility] note        DATE OF SERVICE: 12/17/2022       PCP: Mima Sierra DO      CHIEF COMPLAINT:   Chief Complaint   Patient presents with    Fatigue     Gets tired real quick        HISTORY OF PRESENT ILLNESS:  Erlinda Cuevas is a 77 y.o. female with a PMH of   Past Medical History:   Diagnosis Date    Benign paroxysmal vertigo     Cardiomyopathy     Hypertension     Pulmonary HTN     Stroke     affected balance     Takotsubo cardiomyopathy     Valvular regurgitation      who presents for HD follow up from admission for NSTEMI and new onset a fib. She had LHC which demonstrated 3 vessel CAD but the targets were too small for CABG.  She had successful DILMA to the pros to mid LAD; ost to prox Lcx and OM1. She was noted to be in a fib with RVR on arrival to the ED. This was new onset. It appears taht she spontaneously converted to RSR with rate control. She was discharged on Eliquis for CVA prophylaxis but states that she couldn't afford it. She did have a fall 2 weeks ago because she "got off balance".This is not the first fall that she has had. I recommended referral to EP to be evaluated for a Watchman procedure. She declined siteing transportation issues. We discussed option for  Warfarin and she refused due to family members in the past having issues with warfarin. She was given the forms to fill out for patient assistance. IN the mean time the only option is to continue asa/plavix not only for recent NSTEMI/DILMA but also CVA prophylaxis. Other than getting tired faster than usual and she is "taking too much medicine, she denies complaints.     6/13/2022 routine follow up. She reports having an episode of chest discomfort when she was outside working in her garden lifting heavy things. She states, "It didn't last long and it wasn't bad". Patient declines stress test. She stats she has stopped the cholesterol medicine that neurology gave her due to a rash.   Chief Complaint   Patient " presents with    Fatigue     Gets tired real quick            PAST MEDICAL HISTORY:  Past Medical History:   Diagnosis Date    Benign paroxysmal vertigo     Cardiomyopathy     Hypertension     Pulmonary HTN     Stroke     affected balance     Takotsubo cardiomyopathy     Valvular regurgitation        PAST SURGICAL HISTORY:  Past Surgical History:   Procedure Laterality Date    ANGIOGRAM, CORONARY, WITH LEFT HEART CATHETERIZATION N/A 2022    Procedure: Angiogram, Coronary, with Left Heart Cath;  Surgeon: Mingo Denson DO;  Location: [facility] CATH LAB;  Service: Cardiology;  Laterality: N/A;    CATARACT EXTRACTION W/ INTRAOCULAR LENS  IMPLANT, BILATERAL      CORONARY ANGIOGRAPHY INCLUDING BYPASS GRAFTS WITH CATHETERIZATION OF LEFT HEART N/A 2022    Procedure: ANGIOGRAM, CORONARY, INCLUDING BYPASS GRAFT, WITH LEFT HEART CATHETERIZATION;  Surgeon: Mingo Denson DO;  Location: [facility] CATH LAB;  Service: Cardiology;  Laterality: N/A;    EYE SURGERY      HYSTERECTOMY         SOCIAL HISTORY:  Social History     Socioeconomic History    Marital status:    Tobacco Use    Smoking status: Former     Types: Cigarettes     Quit date:      Years since quittin.9    Smokeless tobacco: Never   Substance and Sexual Activity    Alcohol use: Never    Drug use: Never    Sexual activity: Not Currently     Social Determinants of Health     Financial Resource Strain: Low Risk     Difficulty of Paying Living Expenses: Not hard at all   Food Insecurity: No Food Insecurity    Worried About Running Out of Food in the Last Year: Never true    Ran Out of Food in the Last Year: Never true   Transportation Needs: No Transportation Needs    Lack of Transportation (Medical): No    Lack of Transportation (Non-Medical): No   Physical Activity: Inactive    Days of Exercise per Week: 0 days    Minutes of Exercise per Session: 0 min   Stress: No Stress Concern Present    Feeling of Stress : Only a little   Social  Connections: Moderately Isolated    Frequency of Communication with Friends and Family: Twice a week    Frequency of Social Gatherings with Friends and Family: Twice a week    Attends Mosque Services: Never    Active Member of Clubs or Organizations: Yes    Attends Club or Organization Meetings: Never    Marital Status:    Housing Stability: Low Risk     Unable to Pay for Housing in the Last Year: No    Number of Places Lived in the Last Year: 1    Unstable Housing in the Last Year: No       FAMILY HISTORY:  Family History   Problem Relation Age of Onset    Cancer Mother     Heart disease Father     Heart disease Sister     Diabetes Sister     Cancer Brother          ALLERGIES:  Review of patient's allergies indicates:   Allergen Reactions    Pravastatin Rash        MEDICATIONS:    Current Outpatient Medications:     aspirin (ECOTRIN) 81 MG EC tablet, Take 81 mg by mouth once daily., Disp: , Rfl:     metFORMIN (GLUCOPHAGE) 500 MG tablet, Take 2 tablets (1,000 mg total) by mouth 2 (two) times daily with meals. Take 2 tabs twice daily, Disp: 360 tablet, Rfl: 3    paroxetine (PAXIL) 10 MG tablet, Take 1 tablet (10 mg total) by mouth every morning., Disp: 90 tablet, Rfl: 3    vitamin E 400 UNIT capsule, Take 400 Units by mouth once daily., Disp: , Rfl:     clindamycin (CLEOCIN T) 1 % lotion, APPLY TOPICALLY TWICE DAILY TO THE AFFECTED AREA(S) OF BOTH ARMPITS, Disp: , Rfl:     clopidogreL (PLAVIX) 75 mg tablet, Take 1 tablet (75 mg total) by mouth once daily., Disp: 90 tablet, Rfl: 3    isosorbide mononitrate (IMDUR) 30 MG 24 hr tablet, Take 1 tablet (30 mg total) by mouth once daily., Disp: 90 tablet, Rfl: 3    lisinopriL (PRINIVIL,ZESTRIL) 20 MG tablet, Take 1 tablet (20 mg total) by mouth 2 (two) times a day., Disp: 180 tablet, Rfl: 3    metoprolol succinate (TOPROL-XL) 50 MG 24 hr tablet, Take 1 tablet (50 mg total) by mouth once daily., Disp: 90 tablet, Rfl: 3    spironolactone (ALDACTONE) 25 MG tablet,  "Take 1 tablet (25 mg total) by mouth once daily., Disp: 90 tablet, Rfl: 3  Medications have been reviewed and reconciled.     PHYSICAL EXAM:  /60 (BP Location: Left arm, Patient Position: Sitting)   Pulse 62   Ht 4' 11" (1.499 m)   Wt 57.9 kg (127 lb 9.6 oz)   SpO2 98%   BMI 25.77 kg/m²   Wt Readings from Last 3 Encounters:   12/13/22 57.9 kg (127 lb 9.6 oz)   12/13/22 59.9 kg (132 lb)   11/29/22 59.4 kg (131 lb)      Body mass index is 25.77 kg/m².    Physical Exam  Vitals and nursing note reviewed.   Constitutional:       Appearance: Normal appearance. She is obese.   HENT:      Head: Normocephalic and atraumatic.   Eyes:      Pupils: Pupils are equal, round, and reactive to light.   Neck:      Vascular: No carotid bruit.   Cardiovascular:      Rate and Rhythm: Normal rate and regular rhythm.      Pulses: Normal pulses.      Heart sounds: Murmur heard.      Comments: I/VI HOLGER RUSB  Pulmonary:      Effort: Pulmonary effort is normal.      Breath sounds: Normal breath sounds.   Abdominal:      General: Bowel sounds are normal.      Palpations: Abdomen is soft.   Musculoskeletal:      Cervical back: Neck supple.      Right lower leg: No edema.      Left lower leg: No edema.   Skin:     General: Skin is warm and dry.      Capillary Refill: Capillary refill takes less than 2 seconds.   Neurological:      General: No focal deficit present.      Mental Status: She is alert and oriented to person, place, and time.   Psychiatric:         Mood and Affect: Mood normal.         Behavior: Behavior normal.       LABS REVIEWED:  Lab Results   Component Value Date    WBC 6.27 11/22/2022    RBC 3.71 (L) 11/22/2022    HGB 12.2 11/22/2022    HCT 35.1 (L) 11/22/2022    MCV 94.6 11/22/2022    MCH 32.9 (H) 11/22/2022    MCHC 34.8 11/22/2022    RDW 11.9 11/22/2022     11/22/2022    MPV 10.3 11/22/2022    NRBC 0.0 11/22/2022    INR 0.98 11/17/2022     Lab Results   Component Value Date     12/13/2022    K 5.1 " 2022     2022    CO2 27 2022    BUN 27 (H) 2022    MG 1.7 2022     Lab Results   Component Value Date    AST 19 2022    ALT 23 2022     Lab Results   Component Value Date     (H) 2022    HGBA1C 7.7 (H) 2022     Lab Results   Component Value Date    CHOL 201 (H) 2022    HDL 31 (L) 2022    TRIG 428 (H) 2022    CHOLHDL 6.5 2022       CARDIAC STUDIES REVIEWED:EK2022 sinus bradycardia with HR 58 bpm; LBBB  2022 atrial fibrillation with ventricular rate of 127 bpm with IVCD    2022 sinus bradycardia; HR 51 bpm; NSIVB; no significant change from EKG done 2020 echo  Results for orders placed during the hospital encounter of 22    Echo    Interpretation Summary  · The left ventricle is normal in size with concentric hypertrophy and moderately decreased systolic function.  · The estimated ejection fraction is 35%.  · Left ventricular diastolic dysfunction.  · Atrial fibrillation not observed.  · Normal right ventricular size.  · Mild mitral regurgitation.  · There is pulmonary hypertension.  · Mild to moderate tricuspid regurgitation.  · Mild pulmonic regurgitation.  · Normal central venous pressure (3 mmHg).  · The estimated PA systolic pressure is 47 mmHg.  · Trivial pericardial effusion.         Results for orders placed during the hospital encounter of 22    Cardiac catheterization    Conclusion    The Ost Cx to Prox Cx lesion was 90% stenosed with 0% stenosis post-intervention.    The Prox LAD to Mid LAD lesion was 95% stenosed with 0% stenosis post-intervention.    The 1st Mrg lesion was 80% stenosed with 0% stenosis post-intervention.    A stent was successfully placed.    A stent was successfully placed.    A stent was successfully placed.    The estimated blood loss was none.    There was three vessel coronary artery disease.    The procedure log was documented by Documenter:  "Lisandra Hopkins, RT and verified by Mingo Denson DO.    Date: 11/21/2022  Time: 1:42 PM  Severe stenosis proximal Cx, OM1 undergoing successful PCI with DILMA x 4  Severe stenosis mid LAD undergoing successful PCI with DILMA x 1  Residual severe stenosis in Mid RCA, too small for intervention       ASSESSMENT:   Patient Active Problem List   Diagnosis    Cerebrovascular accident (CVA)    Depression    Type 2 diabetes mellitus, without long-term current use of insulin    Hypertension    Numbness and tingling of left leg    Dorsalgia, unspecified    Hemiparesis affecting left side as late effect of cerebrovascular accident (CVA)    Hyperlipidemia    Takotsubo cardiomyopathy    Bilateral carotid artery stenosis    Noncompliance    Atrial fibrillation with RVR    Frequent falls    NSTEMI (non-ST elevated myocardial infarction)    Hypokalemia    Chronic combined systolic and diastolic heart failure    Paroxysmal atrial fibrillation      Paroxysmal atrial fibrillation  New onset a fib diagnosed in ED 11/17/2022 with ventricular rate of 127 bpm. She was given Cardiazem with rate control documented. EKG 11/18/2022 09:36 demonstrated sinus bradycardia.She was to have started Eliquis but has not due to the cost. She refuses warfarin. I d/w her potential for Watchman device given her recent fall. She declines to issues with transportation. She understands the risk for CVA with recurnt a fib. The only option she has left me is to continue the asa/plavix for CVA prophylaxis although it is not ideal.    NSTEMI (non-ST elevated myocardial infarction)  11/17/2022  Tolerating ace/beta blocker   Patient has been resistent to the idea of taking statins for years. She tried pravatstain and "had a rash".    Chronic combined systolic and diastolic heart failure  LVDD and decreased LVSF (LVEF 35%) by echo 11/18/2022  Tolerating lisinopril and metoprolol XL without apparent issues  Sh did not bring the aldactone with her today. It is not " "clear if she is taking it or not.Complaince with meds and treatment plan has always been an issue.     Problem List Items Addressed This Visit          Neuro    Cerebrovascular accident (CVA) (Chronic)    Relevant Medications    clopidogreL (PLAVIX) 75 mg tablet       Cardiac/Vascular    Chronic combined systolic and diastolic heart failure    Overview     Echo 11/17/22:   The estimated ejection fraction is 35%.  Left ventricular diastolic dysfunction.  Mild mitral regurgitation.  There is pulmonary hypertension.  Mild to moderate tricuspid regurgitation.  Mild pulmonic regurgitation.  Normal central venous pressure (3 mmHg).  The estimated PA systolic pressure is 47 mmHg.             Current Assessment & Plan     LVDD and decreased LVSF (LVEF 35%) by echo 11/18/2022  Tolerating lisinopril and metoprolol XL without apparent issues  Sh did not bring the aldactone with her today. It is not clear if she is taking it or not.Complaince with meds and treatment plan has always been an issue.           Hypertension - Primary    Relevant Medications    spironolactone (ALDACTONE) 25 MG tablet    lisinopriL (PRINIVIL,ZESTRIL) 20 MG tablet    NSTEMI (non-ST elevated myocardial infarction)    Overview               Current Assessment & Plan     11/17/2022  Tolerating ace/beta blocker   Patient has been resistent to the idea of taking statins for years. She tried pravatstain and "had a rash".         Paroxysmal atrial fibrillation    Current Assessment & Plan     New onset a fib diagnosed in ED 11/17/2022 with ventricular rate of 127 bpm. She was given Cardiazem with rate control documented. EKG 11/18/2022 09:36 demonstrated sinus bradycardia.She was to have started Eliquis but has not due to the cost. She refuses warfarin. I d/w her potential for Watchman device given her recent fall. She declines to issues with transportation. She understands the risk for CVA with recurnt a fib. The only option she has left me is to continue " the asa/plavix for CVA prophylaxis although it is not ideal.         Relevant Medications    metoprolol succinate (TOPROL-XL) 50 MG 24 hr tablet     Other Visit Diagnoses       Coronary artery disease due to lipid rich plaque        Relevant Medications    metoprolol succinate (TOPROL-XL) 50 MG 24 hr tablet    isosorbide mononitrate (IMDUR) 30 MG 24 hr tablet    High risk medication use        Relevant Orders    Basic Metabolic Panel (Completed)             PLAN   Orders Placed This Encounter   Procedures    Basic Metabolic Panel     Standing Status:   Future     Number of Occurrences:   1     Standing Expiration Date:   2/11/2024      RTC: 3 months

## 2022-12-17 NOTE — ASSESSMENT & PLAN NOTE
New onset a fib diagnosed in ED 11/17/2022 with ventricular rate of 127 bpm. She was given Cardiazem with rate control documented. EKG 11/18/2022 09:36 demonstrated sinus bradycardia.She was to have started Eliquis but has not due to the cost. She refuses warfarin. I d/w her potential for Watchman device given her recent fall. She declines to issues with transportation. She understands the risk for CVA with recurnt a fib. The only option she has left me is to continue the asa/plavix for CVA prophylaxis although it is not ideal.

## 2022-12-27 ENCOUNTER — OFFICE VISIT (OUTPATIENT)
Dept: PRIMARY CARE CLINIC | Facility: CLINIC | Age: 77
End: 2022-12-27
Payer: MEDICARE

## 2022-12-27 DIAGNOSIS — R30.0 DYSURIA: Primary | ICD-10-CM

## 2022-12-27 LAB
BACTERIA #/AREA URNS HPF: ABNORMAL /HPF
BILIRUB UR QL STRIP: NEGATIVE
CLARITY UR: ABNORMAL
COLOR UR: YELLOW
GLUCOSE UR STRIP-MCNC: NORMAL MG/DL
KETONES UR STRIP-SCNC: NEGATIVE MG/DL
LEUKOCYTE ESTERASE UR QL STRIP: ABNORMAL
NITRITE UR QL STRIP: POSITIVE
PH UR STRIP: 5.5 PH UNITS
PROT UR QL STRIP: 100
RBC # UR STRIP: ABNORMAL /UL
SP GR UR STRIP: 1.01
SQUAMOUS #/AREA URNS LPF: ABNORMAL /HPF
UROBILINOGEN UR STRIP-ACNC: NORMAL MG/DL
WBC #/AREA URNS HPF: >182 /HPF
WBC CLUMPS, UA: ABNORMAL /HPF

## 2022-12-27 PROCEDURE — 99499 UNLISTED E&M SERVICE: CPT | Mod: ,,, | Performed by: FAMILY MEDICINE

## 2022-12-27 PROCEDURE — 99499 NO LOS: ICD-10-PCS | Mod: ,,, | Performed by: FAMILY MEDICINE

## 2022-12-27 PROCEDURE — 87086 CULTURE, URINE: ICD-10-PCS | Mod: ,,, | Performed by: CLINICAL MEDICAL LABORATORY

## 2022-12-27 PROCEDURE — 81001 URINALYSIS, REFLEX TO URINE CULTURE: ICD-10-PCS | Mod: ,,, | Performed by: CLINICAL MEDICAL LABORATORY

## 2022-12-27 PROCEDURE — 81001 URINALYSIS AUTO W/SCOPE: CPT | Mod: ,,, | Performed by: CLINICAL MEDICAL LABORATORY

## 2022-12-27 PROCEDURE — 87186 SC STD MICRODIL/AGAR DIL: CPT | Mod: ,,, | Performed by: CLINICAL MEDICAL LABORATORY

## 2022-12-27 PROCEDURE — 87186 CULTURE, URINE: ICD-10-PCS | Mod: ,,, | Performed by: CLINICAL MEDICAL LABORATORY

## 2022-12-27 PROCEDURE — 87086 URINE CULTURE/COLONY COUNT: CPT | Mod: ,,, | Performed by: CLINICAL MEDICAL LABORATORY

## 2022-12-27 PROCEDURE — 87077 CULTURE, URINE: ICD-10-PCS | Mod: ,,, | Performed by: CLINICAL MEDICAL LABORATORY

## 2022-12-27 PROCEDURE — 87077 CULTURE AEROBIC IDENTIFY: CPT | Mod: ,,, | Performed by: CLINICAL MEDICAL LABORATORY

## 2022-12-28 ENCOUNTER — TELEPHONE (OUTPATIENT)
Dept: PRIMARY CARE CLINIC | Facility: CLINIC | Age: 77
End: 2022-12-28
Payer: MEDICARE

## 2022-12-28 DIAGNOSIS — N30.00 ACUTE CYSTITIS WITHOUT HEMATURIA: Primary | ICD-10-CM

## 2022-12-28 RX ORDER — NITROFURANTOIN 25; 75 MG/1; MG/1
100 CAPSULE ORAL 2 TIMES DAILY
Qty: 14 CAPSULE | Refills: 0 | Status: SHIPPED | OUTPATIENT
Start: 2022-12-28 | End: 2023-10-16

## 2022-12-28 RX ORDER — PHENAZOPYRIDINE HYDROCHLORIDE 100 MG/1
100 TABLET, FILM COATED ORAL 3 TIMES DAILY PRN
Qty: 9 TABLET | Refills: 0 | Status: SHIPPED | OUTPATIENT
Start: 2022-12-28 | End: 2022-12-31

## 2022-12-28 NOTE — TELEPHONE ENCOUNTER
----- Message from Mima Sierra DO sent at 12/28/2022  8:34 AM CST -----  UA positive for infection. Macrobid twice daily sent to pharmacy.

## 2022-12-29 LAB — UA COMPLETE W REFLEX CULTURE PNL UR: ABNORMAL

## 2022-12-30 DIAGNOSIS — H34.9 RETINAL ARTERY OCCLUSION: Primary | ICD-10-CM

## 2023-01-19 ENCOUNTER — TELEPHONE (OUTPATIENT)
Dept: PRIMARY CARE CLINIC | Facility: CLINIC | Age: 78
End: 2023-01-19
Payer: MEDICARE

## 2023-01-19 NOTE — TELEPHONE ENCOUNTER
is going to mail the patient the paperwork to fill out for the eliqus discount----- Message from Mima Sierra, DO sent at 1/12/2023 10:49 AM CST -----  She needs to contact the cardiology office and see if they can get her signed up for whatever assistance program they have. The only one I can refer to is only for patients who have prescription coverage and can't afford their copay.     Please let them know we can't mail medications, she will need to have her prescriptions sent to either a mail order pharmacy or a local pharmacy that offers delivery.   ----- Message -----  From: Carmen Lares LPN  Sent: 1/10/2023  12:39 PM CST  To: Mima Sierra, DO    What did you say we need need to do about her eliquis. Did you want me to let her son know we [place] medication [place] the mall.?  ----- Message -----  From: Yuly Zapata  Sent: 1/9/2023   8:17 AM CST  To: Carmen Lares LPN    Patient has medications questions. Please give a call at [phone].

## 2023-02-15 ENCOUNTER — TELEPHONE (OUTPATIENT)
Dept: PRIMARY CARE CLINIC | Facility: CLINIC | Age: 78
End: 2023-02-15
Payer: MEDICARE

## 2023-03-06 PROBLEM — I21.4 NSTEMI (NON-ST ELEVATED MYOCARDIAL INFARCTION): Status: RESOLVED | Noted: 2022-11-19 | Resolved: 2023-03-06

## 2023-10-16 ENCOUNTER — OFFICE VISIT (OUTPATIENT)
Dept: FAMILY MEDICINE | Facility: CLINIC | Age: 78
End: 2023-10-16
Payer: MEDICARE

## 2023-10-16 VITALS
TEMPERATURE: 98 F | RESPIRATION RATE: 17 BRPM | BODY MASS INDEX: 22.85 KG/M2 | WEIGHT: 116.38 LBS | HEIGHT: 60 IN | OXYGEN SATURATION: 100 % | HEART RATE: 92 BPM

## 2023-10-16 DIAGNOSIS — E11.65 TYPE 2 DIABETES MELLITUS WITH HYPERGLYCEMIA, WITHOUT LONG-TERM CURRENT USE OF INSULIN: ICD-10-CM

## 2023-10-16 DIAGNOSIS — R30.0 DYSURIA: ICD-10-CM

## 2023-10-16 DIAGNOSIS — N30.00 ACUTE CYSTITIS WITHOUT HEMATURIA: Primary | ICD-10-CM

## 2023-10-16 LAB
BILIRUB SERPL-MCNC: NEGATIVE MG/DL
BLOOD URINE, POC: NEGATIVE
COLOR, POC UA: YELLOW
GLUCOSE SERPL-MCNC: 434 MG/DL (ref 70–110)
GLUCOSE UR QL STRIP: ABNORMAL
KETONES UR QL STRIP: NEGATIVE
LEUKOCYTE ESTERASE URINE, POC: NEGATIVE
NITRITE, POC UA: POSITIVE
PH, POC UA: 5.5
PROTEIN, POC: ABNORMAL
SPECIFIC GRAVITY, POC UA: 1.01
UROBILINOGEN, POC UA: 0.2

## 2023-10-16 PROCEDURE — 81003 URINALYSIS AUTO W/O SCOPE: CPT | Mod: RHCUB | Performed by: NURSE PRACTITIONER

## 2023-10-16 PROCEDURE — 99213 PR OFFICE/OUTPT VISIT, EST, LEVL III, 20-29 MIN: ICD-10-PCS | Mod: ,,, | Performed by: NURSE PRACTITIONER

## 2023-10-16 PROCEDURE — 99213 OFFICE O/P EST LOW 20 MIN: CPT | Mod: ,,, | Performed by: NURSE PRACTITIONER

## 2023-10-16 RX ORDER — CEFUROXIME AXETIL 500 MG/1
500 TABLET ORAL 2 TIMES DAILY
Qty: 20 TABLET | Refills: 1 | Status: SHIPPED | OUTPATIENT
Start: 2023-10-16 | End: 2023-10-26

## 2023-10-16 NOTE — PROGRESS NOTES
Subjective:       Patient ID: Erlinda Cuevas is a 78 y.o. female.    Chief Complaint: Urinary Tract Infection (States small amount of pain with urination. Ongoing since January. States she was taking macrobid but she ran out of pill.)    Presents to clinic as above. No fever. States quit taking Metformin and blood thinners a while back and feels better than she has felt in months.       Review of Systems   Constitutional: Negative.    Respiratory: Negative.     Cardiovascular: Negative.    Gastrointestinal: Negative.    Genitourinary:  Positive for dysuria, frequency and urgency. Negative for flank pain and hematuria.          Reviewed family, medical, surgical, and social history.    Objective:      Pulse 92   Temp 98.1 °F (36.7 °C) (Oral)   Resp 17   Ht 5' (1.524 m)   Wt 52.8 kg (116 lb 6.4 oz)   SpO2 100%   BMI 22.73 kg/m²   Physical Exam  Vitals and nursing note reviewed.   Constitutional:       General: She is not in acute distress.     Appearance: Normal appearance. She is normal weight. She is not ill-appearing, toxic-appearing or diaphoretic.   HENT:      Head: Normocephalic.      Mouth/Throat:      Mouth: Mucous membranes are moist.   Cardiovascular:      Rate and Rhythm: Normal rate and regular rhythm.      Heart sounds: Normal heart sounds.   Pulmonary:      Effort: Pulmonary effort is normal.      Breath sounds: Normal breath sounds.   Abdominal:      General: Abdomen is flat. Bowel sounds are normal. There is no distension.      Palpations: Abdomen is soft. There is no mass.      Tenderness: There is no abdominal tenderness. There is no right CVA tenderness, left CVA tenderness, guarding or rebound.      Hernia: No hernia is present.   Musculoskeletal:      Cervical back: Normal range of motion and neck supple.   Skin:     General: Skin is warm and dry.      Capillary Refill: Capillary refill takes less than 2 seconds.   Neurological:      Mental Status: She is alert and oriented to  person, place, and time.   Psychiatric:         Mood and Affect: Mood normal.         Behavior: Behavior normal.         Thought Content: Thought content normal.         Judgment: Judgment normal.            Office Visit on 10/16/2023   Component Date Value Ref Range Status    Color, UA 10/16/2023 Yellow   Final    Spec Grav UA 10/16/2023 1.015   Final    pH, UA 10/16/2023 5.5   Final    WBC, UA 10/16/2023 negative   Final    Nitrite, UA 10/16/2023 positive   Final    Protein, POC 10/16/2023 trace   Final    Glucose, UA 10/16/2023 >=1000 mg/dL   Final    Ketones, UA 10/16/2023 negative   Final    Bilirubin, POC 10/16/2023 negative   Final    Urobilinogen, UA 10/16/2023 0.2   Final    Blood, UA 10/16/2023 negative   Final    POC Glucose 10/16/2023 434 (A)  70 - 110 MG/DL Final      Assessment:       1. Acute cystitis without hematuria    2. Dysuria    3. Type 2 diabetes mellitus with hyperglycemia, without long-term current use of insulin        Plan:       Acute cystitis without hematuria  -     cefUROXime (CEFTIN) 500 MG tablet; Take 1 tablet (500 mg total) by mouth 2 (two) times daily. for 10 days  Dispense: 20 tablet; Refill: 1    Dysuria  -     POCT URINALYSIS W/O SCOPE  -     Urine culture    Type 2 diabetes mellitus with hyperglycemia, without long-term current use of insulin  -     POCT glucose    Urged to restart Metformin. States has some at home. She says she is going to change her diet instead. Counseled that diet control probably will not be effective enough to lower blood sugar. She says she will decide. Counseled on dangers of not taking medication and uncontrolled diabetes. She voiced understanding.   F/U with PCP  RTC PRN          Risks, benefits, and side effects were discussed with the patient. All questions were answered to the fullest satisfaction of the patient, and pt verbalized understanding and agreement to treatment plan. Pt was to call with any new or worsening symptoms, or present to the  [facility]

## 2023-11-07 ENCOUNTER — OFFICE VISIT (OUTPATIENT)
Dept: CARDIOLOGY | Facility: CLINIC | Age: 78
End: 2023-11-07
Payer: MEDICARE

## 2023-11-07 VITALS
HEART RATE: 96 BPM | BODY MASS INDEX: 23.42 KG/M2 | HEIGHT: 59 IN | SYSTOLIC BLOOD PRESSURE: 152 MMHG | OXYGEN SATURATION: 99 % | WEIGHT: 116.19 LBS | DIASTOLIC BLOOD PRESSURE: 88 MMHG

## 2023-11-07 DIAGNOSIS — I48.91 ATRIAL FIBRILLATION WITH RVR: ICD-10-CM

## 2023-11-07 DIAGNOSIS — I10 HYPERTENSION, UNSPECIFIED TYPE: Primary | ICD-10-CM

## 2023-11-07 DIAGNOSIS — I50.42 CHRONIC COMBINED SYSTOLIC AND DIASTOLIC HEART FAILURE: ICD-10-CM

## 2023-11-07 PROCEDURE — 99213 OFFICE O/P EST LOW 20 MIN: CPT | Mod: PBBFAC | Performed by: NURSE PRACTITIONER

## 2023-11-07 PROCEDURE — 99214 OFFICE O/P EST MOD 30 MIN: CPT | Mod: S$PBB,,, | Performed by: NURSE PRACTITIONER

## 2023-11-07 PROCEDURE — 93005 ELECTROCARDIOGRAM TRACING: CPT | Mod: PBBFAC | Performed by: INTERNAL MEDICINE

## 2023-11-07 PROCEDURE — 93010 ELECTROCARDIOGRAM REPORT: CPT | Mod: S$PBB,,, | Performed by: INTERNAL MEDICINE

## 2023-11-07 PROCEDURE — 99214 PR OFFICE/OUTPT VISIT, EST, LEVL IV, 30-39 MIN: ICD-10-PCS | Mod: S$PBB,,, | Performed by: NURSE PRACTITIONER

## 2023-11-07 PROCEDURE — 93010 EKG 12-LEAD: ICD-10-PCS | Mod: S$PBB,,, | Performed by: INTERNAL MEDICINE

## 2023-11-07 NOTE — PROGRESS NOTES
"PCP: No, Primary Doctor    Referring Provider:     Subjective:   Erlinda Cuevas is a 78 y.o. female with hx of CMP, HTN, pulmonary HTN, CVA, Takotsubo CMP,  who presents for routine follow up. She states that she feels great. She has "stopped all my medicine and I am not going to take it any more."    12/13/2022  presents for HD follow up from admission for NSTEMI and new onset a fib. She had LHC which demonstrated 3 vessel CAD but the targets were too small for CABG.  She had successful DILMA to the pros to mid LAD; ost to prox Lcx and OM1. She was noted to be in a fib with RVR on arrival to the ED. This was new onset. It appears taht she spontaneously converted to RSR with rate control. She was discharged on Eliquis for CVA prophylaxis but states that she couldn't afford it. She did have a fall 2 weeks ago because she "got off balance".This is not the first fall that she has had. I recommended referral to EP to be evaluated for a Watchman procedure. She declined siteing transportation issues. We discussed option for  Warfarin and she refused due to family members in the past having issues with warfarin. She was given the forms to fill out for patient assistance. IN the mean time the only option is to continue asa/plavix not only for recent NSTEMI/DILMA but also CVA prophylaxis. Other than getting tired faster than usual and she is "taking too much medicine, she denies complaints.      6/13/2022 routine follow up. She reports having an episode of chest discomfort when she was outside working in her garden lifting heavy things. She states, "It didn't last long and it wasn't bad". Patient declines stress test. She stats she has stopped the cholesterol medicine that neurology gave her due to a rash.     Fhx:  Family History   Problem Relation Age of Onset    Cancer Mother     Heart disease Father     Heart disease Sister     Diabetes Sister     Cancer Brother      Shx:   Social History     Socioeconomic History    " Marital status:    Tobacco Use    Smoking status: Former     Current packs/day: 0.00     Types: Cigarettes     Quit date:      Years since quittin.8    Smokeless tobacco: Never   Substance and Sexual Activity    Alcohol use: Never    Drug use: Never    Sexual activity: Not Currently     Social Determinants of Health     Financial Resource Strain: Low Risk  (2022)    Overall Financial Resource Strain (CARDIA)     Difficulty of Paying Living Expenses: Not hard at all   Food Insecurity: No Food Insecurity (2022)    Hunger Vital Sign     Worried About Running Out of Food in the Last Year: Never true     Ran Out of Food in the Last Year: Never true   Transportation Needs: No Transportation Needs (2022)    PRAPARE - Transportation     Lack of Transportation (Medical): No     Lack of Transportation (Non-Medical): No   Physical Activity: Inactive (2022)    Exercise Vital Sign     Days of Exercise per Week: 0 days     Minutes of Exercise per Session: 0 min   Stress: No Stress Concern Present (2022)    [employer] [employer] - Occupational Stress Questionnaire     Feeling of Stress : Only a little   Social Connections: Moderately Isolated (2022)    Social Connection and Isolation Panel [NHANES]     Frequency of Communication with Friends and Family: Twice a week     Frequency of Social Gatherings with Friends and Family: Twice a week     Attends Anabaptism Services: Never     Active Member of Clubs or Organizations: Yes     Attends Club or Organization Meetings: Never     Marital Status:    Housing Stability: Low Risk  (2022)    Housing Stability Vital Sign     Unable to Pay for Housing in the Last Year: No     Number of Places Lived in the Last Year: 1     Unstable Housing in the Last Year: No       EKG 2023 RSR with HR 85 bpm; LBBB; no significant change found when compared to EKG 2022 sinus bradycardia HR 58 bpm; left  axis deviation; LBBB    ECHO Results for orders placed during the hospital encounter of 11/17/22    Echo    Interpretation Summary  · The left ventricle is normal in size with concentric hypertrophy and moderately decreased systolic function.  · The estimated ejection fraction is 35%.  · Left ventricular diastolic dysfunction.  · Atrial fibrillation not observed.  · Normal right ventricular size.  · Mild mitral regurgitation.  · There is pulmonary hypertension.  · Mild to moderate tricuspid regurgitation.  · Mild pulmonic regurgitation.  · Normal central venous pressure (3 mmHg).  · The estimated PA systolic pressure is 47 mmHg.  · Trivial pericardial effusion.    [facility] Results for orders placed during the hospital encounter of 11/17/22    Cardiac catheterization    Conclusion    The Ost Cx to Prox Cx lesion was 90% stenosed with 0% stenosis post-intervention.    The Prox LAD to Mid LAD lesion was 95% stenosed with 0% stenosis post-intervention.    The 1st Mrg lesion was 80% stenosed with 0% stenosis post-intervention.    A stent was successfully placed.    A stent was successfully placed.    A stent was successfully placed.    The estimated blood loss was none.    There was three vessel coronary artery disease.    The procedure log was documented by Documenter: RT Rola and verified by Mingo Denson DO.    Date: 11/21/2022  Time: 1:42 PM  Severe stenosis proximal Cx, OM1 undergoing successful PCI with DILMA x 4  Severe stenosis mid LAD undergoing successful PCI with DILMA x 1  Residual severe stenosis in Mid RCA, too small for intervention        Lab Results   Component Value Date     12/13/2022    K 5.1 12/13/2022     12/13/2022    CO2 27 12/13/2022    BUN 27 (H) 12/13/2022    CREATININE 1.25 (H) 12/13/2022    CALCIUM 10.5 (H) 12/13/2022    ANIONGAP 11 12/13/2022    EGFRNONAA 54 (L) 06/22/2022       Lab Results   Component Value Date    CHOL 201 (H) 11/18/2022    CHOL 273 (H) 06/22/2022    CHOL  "266 (H) 09/07/2021     Lab Results   Component Value Date    HDL 31 (L) 11/18/2022    HDL 31 (L) 06/22/2022    HDL 40 09/07/2021     Lab Results   Component Value Date    LDLCALC  06/22/2022      Comment:      Unable to calculate due to one of the following values:  Cholesterol <5  HDL Cholesterol <5  Triglycerides <10 or >400    LDLCALC  09/07/2021      Comment:      Unable to calculate due to one of the following values:  Cholesterol <5  HDL Cholesterol <5  Triglycerides <10 or >400     Lab Results   Component Value Date    TRIG 428 (H) 11/18/2022    TRIG 998 (H) 06/22/2022    TRIG 421 (H) 09/07/2021     Lab Results   Component Value Date    CHOLHDL 6.5 11/18/2022    CHOLHDL 8.8 06/22/2022    CHOLHDL 6.7 09/07/2021       Lab Results   Component Value Date    WBC 6.27 11/22/2022    HGB 12.2 11/22/2022    HCT 35.1 (L) 11/22/2022    MCV 94.6 11/22/2022     11/22/2022           Current Outpatient Medications:     aspirin (ECOTRIN) 81 MG EC tablet, Take 81 mg by mouth every other day., Disp: , Rfl:     Patient has stopped all meds prior to arrival. She takes ASA 81 once a week. She has agreed to restart ASA but only at every other day. All other meds she refuses.       Review of Systems   Respiratory:  Negative for shortness of breath.    Cardiovascular:  Negative for chest pain, palpitations, orthopnea, claudication, leg swelling and PND.   Neurological:  Negative for dizziness, loss of consciousness and weakness.           Objective:   BP (!) 152/88 (BP Location: Left arm, Patient Position: Sitting)   Pulse 96   Ht 4' 11" (1.499 m)   Wt 52.7 kg (116 lb 3.2 oz)   SpO2 99%   BMI 23.47 kg/m²     Physical Exam  Vitals and nursing note reviewed.   Constitutional:       Appearance: Normal appearance. She is normal weight.   HENT:      Head: Normocephalic and atraumatic.   Neck:      Vascular: No carotid bruit.   Cardiovascular:      Rate and Rhythm: Normal rate and regular rhythm.      Pulses: Normal pulses.    " "  Heart sounds: Normal heart sounds.   Pulmonary:      Effort: Pulmonary effort is normal.      Breath sounds: Normal breath sounds.   Abdominal:      Palpations: Abdomen is soft.   Musculoskeletal:      Cervical back: Neck supple.      Right lower leg: No edema.      Left lower leg: No edema.   Skin:     General: Skin is warm and dry.      Capillary Refill: Capillary refill takes less than 2 seconds.   Neurological:      General: No focal deficit present.      Mental Status: She is alert.           Assessment:     1. Hypertension, unspecified type  EKG 12-lead    EKG 12-lead      2. Atrial fibrillation with RVR        3. Chronic combined systolic and diastolic heart failure              Plan:   Atrial fibrillation with RVR  Patient was previously on Eliquis but states she "stopped all of her meds and she feels better. She refuses to restart meds. After much debate she has agreed to take ASA 81 mg po every other day. She understands the risk. She has h/o falls and would qualify for a Watchman implant but she declines.    Chronic combined systolic and diastolic heart failure  Patient denies symptoms. She has stopped all meds and refuses to restart.     Hyperlipidemia  Stopped all meds and refuses to restart    Hypertension  Stopped all meds and refuses to restart    RTC: 1 year    "

## 2023-11-07 NOTE — ASSESSMENT & PLAN NOTE
"Patient was previously on Eliquis but states she "stopped all of her meds and she feels better. She refuses to restart meds. After much debate she has agreed to take ASA 81 mg po every other day. She understands the risk. She has h/o falls and would qualify for a Watchman implant but she declines.  "

## 2024-01-01 ENCOUNTER — HOSPITAL ENCOUNTER (INPATIENT)
Facility: HOSPITAL | Age: 79
LOS: 1 days | DRG: 250 | End: 2024-08-22
Attending: EMERGENCY MEDICINE | Admitting: HOSPITALIST
Payer: MEDICARE

## 2024-01-01 VITALS
TEMPERATURE: 91 F | RESPIRATION RATE: 35 BRPM | SYSTOLIC BLOOD PRESSURE: 88 MMHG | WEIGHT: 110 LBS | OXYGEN SATURATION: 100 % | BODY MASS INDEX: 21.48 KG/M2 | HEART RATE: 55 BPM | DIASTOLIC BLOOD PRESSURE: 42 MMHG

## 2024-01-01 DIAGNOSIS — I21.02 ACUTE ST ELEVATION MYOCARDIAL INFARCTION (STEMI) DUE TO OCCLUSION OF LEFT ANTERIOR DESCENDING (LAD) CORONARY ARTERY: ICD-10-CM

## 2024-01-01 DIAGNOSIS — I25.119 CORONARY ARTERY DISEASE INVOLVING NATIVE CORONARY ARTERY OF NATIVE HEART WITH ANGINA PECTORIS: ICD-10-CM

## 2024-01-01 DIAGNOSIS — R57.0 CARDIOGENIC SHOCK: ICD-10-CM

## 2024-01-01 DIAGNOSIS — R06.00 DYSPNEA: ICD-10-CM

## 2024-01-01 DIAGNOSIS — I21.3 ST ELEVATION MYOCARDIAL INFARCTION (STEMI): ICD-10-CM

## 2024-01-01 DIAGNOSIS — E87.20 HYPERLACTATEMIA: ICD-10-CM

## 2024-01-01 DIAGNOSIS — I21.3 ST ELEVATION MYOCARDIAL INFARCTION (STEMI), UNSPECIFIED ARTERY: Primary | ICD-10-CM

## 2024-01-01 DIAGNOSIS — I21.3 STEMI (ST ELEVATION MYOCARDIAL INFARCTION): ICD-10-CM

## 2024-01-01 DIAGNOSIS — T68.XXXA HYPOTHERMIA, INITIAL ENCOUNTER: ICD-10-CM

## 2024-01-01 DIAGNOSIS — I46.9 CARDIAC ARREST: ICD-10-CM

## 2024-01-01 DIAGNOSIS — R06.00 DYSPNEA, UNSPECIFIED TYPE: ICD-10-CM

## 2024-01-01 LAB
ALBUMIN SERPL BCP-MCNC: 4 G/DL (ref 3.5–5)
ALBUMIN/GLOB SERPL: 1 {RATIO}
ALP SERPL-CCNC: 186 U/L (ref 55–142)
ALT SERPL W P-5'-P-CCNC: 2045 U/L (ref 13–56)
ANION GAP SERPL CALCULATED.3IONS-SCNC: 38 MMOL/L (ref 7–16)
ANISOCYTOSIS BLD QL SMEAR: ABNORMAL
APTT PPP: 38.3 SECONDS (ref 25.2–37.3)
AST SERPL W P-5'-P-CCNC: 3576 U/L (ref 15–37)
BASOPHILS # BLD AUTO: 0.11 K/UL (ref 0–0.2)
BASOPHILS NFR BLD AUTO: 0.4 % (ref 0–1)
BASOPHILS NFR BLD MANUAL: 1 % (ref 0–1)
BILIRUB SERPL-MCNC: 1.7 MG/DL (ref ?–1.2)
BUN SERPL-MCNC: 46 MG/DL (ref 7–18)
BUN/CREAT SERPL: 17 (ref 6–20)
BURR CELLS BLD QL SMEAR: ABNORMAL
CALCIUM SERPL-MCNC: 10 MG/DL (ref 8.5–10.1)
CHLORIDE SERPL-SCNC: 89 MMOL/L (ref 98–107)
CO2 SERPL-SCNC: 7 MMOL/L (ref 21–32)
CREAT SERPL-MCNC: 2.69 MG/DL (ref 0.55–1.02)
CRENATED CELLS: ABNORMAL
DIFFERENTIAL METHOD BLD: ABNORMAL
EGFR (NO RACE VARIABLE) (RUSH/TITUS): 18 ML/MIN/1.73M2
EOSINOPHIL # BLD AUTO: 0.04 K/UL (ref 0–0.5)
EOSINOPHIL NFR BLD AUTO: 0.2 % (ref 1–4)
ERYTHROCYTE [DISTWIDTH] IN BLOOD BY AUTOMATED COUNT: 12.3 % (ref 11.5–14.5)
GLOBULIN SER-MCNC: 3.9 G/DL (ref 2–4)
GLUCOSE SERPL-MCNC: 397 MG/DL (ref 74–106)
GLUCOSE SERPL-MCNC: 427 MG/DL (ref 70–105)
HCO3 UR-SCNC: <3 MMOL/L (ref 21–28)
HCT VFR BLD AUTO: 45.2 % (ref 38–47)
HCT VFR BLD CALC: 44 % (ref 35–51)
HGB BLD-MCNC: 14.2 G/DL (ref 12–16)
HYPOCHROMIA BLD QL SMEAR: ABNORMAL
IMM GRANULOCYTES # BLD AUTO: 2.97 K/UL (ref 0–0.04)
IMM GRANULOCYTES NFR BLD: 11.4 % (ref 0–0.4)
INR BLD: 2.37
LDH SERPL L TO P-CCNC: >15 MMOL/L (ref 0.3–1.2)
LYMPHOCYTES # BLD AUTO: 3.12 K/UL (ref 1–4.8)
LYMPHOCYTES NFR BLD AUTO: 12 % (ref 27–41)
LYMPHOCYTES NFR BLD MANUAL: 15 % (ref 27–41)
MACROCYTES BLD QL SMEAR: ABNORMAL
MAGNESIUM SERPL-MCNC: 3.4 MG/DL (ref 1.7–2.3)
MCH RBC QN AUTO: 31.3 PG (ref 27–31)
MCHC RBC AUTO-ENTMCNC: 31.4 G/DL (ref 32–36)
MCV RBC AUTO: 99.8 FL (ref 80–96)
METAMYELOCYTES NFR BLD MANUAL: 2 %
MONOCYTES # BLD AUTO: 2.41 K/UL (ref 0–0.8)
MONOCYTES NFR BLD AUTO: 9.2 % (ref 2–6)
MONOCYTES NFR BLD MANUAL: 11 % (ref 2–6)
MPC BLD CALC-MCNC: 11.4 FL (ref 9.4–12.4)
NEUTROPHILS # BLD AUTO: 17.41 K/UL (ref 1.8–7.7)
NEUTROPHILS NFR BLD AUTO: 66.8 % (ref 53–65)
NEUTS BAND NFR BLD MANUAL: 12 % (ref 1–5)
NEUTS SEG NFR BLD MANUAL: 59 % (ref 50–62)
NRBC # BLD AUTO: 0.07 X10E3/UL
NRBC, AUTO (.00): 0.3 %
NT-PROBNP SERPL-MCNC: ABNORMAL PG/ML (ref 1–450)
OVALOCYTES BLD QL SMEAR: ABNORMAL
PCO2 BLDA: 10 MMHG (ref 35–48)
PH SMN: 6.93 [PH] (ref 7.35–7.45)
PLATELET # BLD AUTO: 286 K/UL (ref 150–400)
PLATELET MORPHOLOGY: ABNORMAL
PO2 BLDA: 269 MMHG (ref 83–108)
POC BASE EXCESS: ABNORMAL MMOL/L (ref -2–3)
POC CO2: ABNORMAL MMOL/L
POC IONIZED CALCIUM: 1.06 MMOL/L (ref 1.15–1.35)
POC SATURATED O2: ABNORMAL % (ref 95–98)
POCT GLUCOSE: 415 MG/DL (ref 60–95)
POLYCHROMASIA BLD QL SMEAR: ABNORMAL
POTASSIUM BLD-SCNC: 5.1 MMOL/L (ref 3.4–4.5)
POTASSIUM SERPL-SCNC: 5.2 MMOL/L (ref 3.5–5.1)
PROT SERPL-MCNC: 7.9 G/DL (ref 6.4–8.2)
PROTHROMBIN TIME: 25.6 SECONDS (ref 11.7–14.7)
RBC # BLD AUTO: 4.53 M/UL (ref 4.2–5.4)
SMUDGE CELLS BLD QL SMEAR: ABNORMAL
SODIUM BLD-SCNC: 125 MMOL/L (ref 136–145)
SODIUM SERPL-SCNC: 129 MMOL/L (ref 136–145)
TROPONIN I SERPL DL<=0.01 NG/ML-MCNC: ABNORMAL PG/ML
WBC # BLD AUTO: 26.06 K/UL (ref 4.5–11)

## 2024-01-01 PROCEDURE — 27100278 HC TUBE HOLDER, ENDOTRACHEAL

## 2024-01-01 PROCEDURE — 27100000 HC BAG AMBU

## 2024-01-01 PROCEDURE — 63600175 PHARM REV CODE 636 W HCPCS: Performed by: INTERNAL MEDICINE

## 2024-01-01 PROCEDURE — 83735 ASSAY OF MAGNESIUM: CPT | Performed by: EMERGENCY MEDICINE

## 2024-01-01 PROCEDURE — 85730 THROMBOPLASTIN TIME PARTIAL: CPT | Performed by: EMERGENCY MEDICINE

## 2024-01-01 PROCEDURE — 99285 EMERGENCY DEPT VISIT HI MDM: CPT | Mod: 25

## 2024-01-01 PROCEDURE — 83605 ASSAY OF LACTIC ACID: CPT

## 2024-01-01 PROCEDURE — 99152 MOD SED SAME PHYS/QHP 5/>YRS: CPT | Performed by: INTERNAL MEDICINE

## 2024-01-01 PROCEDURE — B2111ZZ FLUOROSCOPY OF MULTIPLE CORONARY ARTERIES USING LOW OSMOLAR CONTRAST: ICD-10-PCS | Performed by: INTERNAL MEDICINE

## 2024-01-01 PROCEDURE — 93005 ELECTROCARDIOGRAM TRACING: CPT

## 2024-01-01 PROCEDURE — 93454 CORONARY ARTERY ANGIO S&I: CPT | Mod: 26,XU,51, | Performed by: INTERNAL MEDICINE

## 2024-01-01 PROCEDURE — 92941 PRQ TRLML REVSC TOT OCCL AMI: CPT | Mod: LC | Performed by: INTERNAL MEDICINE

## 2024-01-01 PROCEDURE — 93010 ELECTROCARDIOGRAM REPORT: CPT | Mod: ,,, | Performed by: INTERNAL MEDICINE

## 2024-01-01 PROCEDURE — 25000003 PHARM REV CODE 250: Performed by: EMERGENCY MEDICINE

## 2024-01-01 PROCEDURE — 0BH17EZ INSERTION OF ENDOTRACHEAL AIRWAY INTO TRACHEA, VIA NATURAL OR ARTIFICIAL OPENING: ICD-10-PCS | Performed by: NURSE PRACTITIONER

## 2024-01-01 PROCEDURE — 80053 COMPREHEN METABOLIC PANEL: CPT | Performed by: EMERGENCY MEDICINE

## 2024-01-01 PROCEDURE — 99152 MOD SED SAME PHYS/QHP 5/>YRS: CPT | Mod: ,,, | Performed by: INTERNAL MEDICINE

## 2024-01-01 PROCEDURE — 36415 COLL VENOUS BLD VENIPUNCTURE: CPT | Performed by: EMERGENCY MEDICINE

## 2024-01-01 PROCEDURE — 84484 ASSAY OF TROPONIN QUANT: CPT | Performed by: EMERGENCY MEDICINE

## 2024-01-01 PROCEDURE — 31500 INSERT EMERGENCY AIRWAY: CPT

## 2024-01-01 PROCEDURE — C1725 CATH, TRANSLUMIN NON-LASER: HCPCS | Performed by: INTERNAL MEDICINE

## 2024-01-01 PROCEDURE — 82962 GLUCOSE BLOOD TEST: CPT

## 2024-01-01 PROCEDURE — 93454 CORONARY ARTERY ANGIO S&I: CPT | Mod: XU | Performed by: INTERNAL MEDICINE

## 2024-01-01 PROCEDURE — 36600 WITHDRAWAL OF ARTERIAL BLOOD: CPT

## 2024-01-01 PROCEDURE — 84295 ASSAY OF SERUM SODIUM: CPT

## 2024-01-01 PROCEDURE — 99900035 HC TECH TIME PER 15 MIN (STAT)

## 2024-01-01 PROCEDURE — 82803 BLOOD GASES ANY COMBINATION: CPT

## 2024-01-01 PROCEDURE — 92950 HEART/LUNG RESUSCITATION CPR: CPT | Mod: 51,,, | Performed by: INTERNAL MEDICINE

## 2024-01-01 PROCEDURE — 85025 COMPLETE CBC W/AUTO DIFF WBC: CPT | Performed by: EMERGENCY MEDICINE

## 2024-01-01 PROCEDURE — 25000003 PHARM REV CODE 250: Performed by: INTERNAL MEDICINE

## 2024-01-01 PROCEDURE — 82330 ASSAY OF CALCIUM: CPT

## 2024-01-01 PROCEDURE — 25000003 PHARM REV CODE 250

## 2024-01-01 PROCEDURE — 27201480 HC GLIDESCOPE

## 2024-01-01 PROCEDURE — 92950 HEART/LUNG RESUSCITATION CPR: CPT

## 2024-01-01 PROCEDURE — 85610 PROTHROMBIN TIME: CPT | Performed by: EMERGENCY MEDICINE

## 2024-01-01 PROCEDURE — 99153 MOD SED SAME PHYS/QHP EA: CPT | Performed by: INTERNAL MEDICINE

## 2024-01-01 PROCEDURE — 63600175 PHARM REV CODE 636 W HCPCS: Performed by: EMERGENCY MEDICINE

## 2024-01-01 PROCEDURE — 92941 PRQ TRLML REVSC TOT OCCL AMI: CPT | Mod: LC,,, | Performed by: INTERNAL MEDICINE

## 2024-01-01 PROCEDURE — 96374 THER/PROPH/DIAG INJ IV PUSH: CPT

## 2024-01-01 PROCEDURE — 85014 HEMATOCRIT: CPT

## 2024-01-01 PROCEDURE — 27201423 OPTIME MED/SURG SUP & DEVICES STERILE SUPPLY: Performed by: INTERNAL MEDICINE

## 2024-01-01 PROCEDURE — C1887 CATHETER, GUIDING: HCPCS | Performed by: INTERNAL MEDICINE

## 2024-01-01 PROCEDURE — 84132 ASSAY OF SERUM POTASSIUM: CPT

## 2024-01-01 PROCEDURE — C1769 GUIDE WIRE: HCPCS | Performed by: INTERNAL MEDICINE

## 2024-01-01 PROCEDURE — 02703ZZ DILATION OF CORONARY ARTERY, ONE ARTERY, PERCUTANEOUS APPROACH: ICD-10-PCS | Performed by: INTERNAL MEDICINE

## 2024-01-01 PROCEDURE — 4A023N7 MEASUREMENT OF CARDIAC SAMPLING AND PRESSURE, LEFT HEART, PERCUTANEOUS APPROACH: ICD-10-PCS | Performed by: INTERNAL MEDICINE

## 2024-01-01 PROCEDURE — C1894 INTRO/SHEATH, NON-LASER: HCPCS | Performed by: INTERNAL MEDICINE

## 2024-01-01 PROCEDURE — 82947 ASSAY GLUCOSE BLOOD QUANT: CPT

## 2024-01-01 PROCEDURE — 83880 ASSAY OF NATRIURETIC PEPTIDE: CPT | Performed by: EMERGENCY MEDICINE

## 2024-01-01 PROCEDURE — 99223 1ST HOSP IP/OBS HIGH 75: CPT | Mod: GT,AI,, | Performed by: HOSPITALIST

## 2024-01-01 PROCEDURE — 27100116 HC DETECTOR CO2

## 2024-01-01 PROCEDURE — 5A12012 PERFORMANCE OF CARDIAC OUTPUT, SINGLE, MANUAL: ICD-10-PCS | Performed by: INTERNAL MEDICINE

## 2024-01-01 RX ORDER — SODIUM CHLORIDE 9 MG/ML
INJECTION, SOLUTION INTRAVENOUS
Status: DISCONTINUED | OUTPATIENT
Start: 2024-01-01 | End: 2024-08-23 | Stop reason: HOSPADM

## 2024-01-01 RX ORDER — MAGNESIUM SULFATE 1 G/100ML
INJECTION INTRAVENOUS
Status: DISCONTINUED | OUTPATIENT
Start: 2024-01-01 | End: 2024-08-23 | Stop reason: HOSPADM

## 2024-01-01 RX ORDER — SODIUM BICARBONATE 1 MEQ/ML
SYRINGE (ML) INTRAVENOUS
Status: DISCONTINUED | OUTPATIENT
Start: 2024-01-01 | End: 2024-08-23 | Stop reason: HOSPADM

## 2024-01-01 RX ORDER — MIDAZOLAM HYDROCHLORIDE 1 MG/ML
INJECTION INTRAMUSCULAR; INTRAVENOUS
Status: DISCONTINUED | OUTPATIENT
Start: 2024-01-01 | End: 2024-08-23 | Stop reason: HOSPADM

## 2024-01-01 RX ORDER — HEPARIN SODIUM 5000 [USP'U]/ML
4000 INJECTION, SOLUTION INTRAVENOUS; SUBCUTANEOUS
Status: COMPLETED | OUTPATIENT
Start: 2024-01-01 | End: 2024-01-01

## 2024-01-01 RX ORDER — EPINEPHRINE 0.1 MG/ML
INJECTION INTRAVENOUS
Status: DISCONTINUED | OUTPATIENT
Start: 2024-01-01 | End: 2024-08-23 | Stop reason: HOSPADM

## 2024-01-01 RX ORDER — LIDOCAINE HYDROCHLORIDE 20 MG/ML
INJECTION, SOLUTION INFILTRATION; PERINEURAL
Status: DISCONTINUED | OUTPATIENT
Start: 2024-01-01 | End: 2024-08-23 | Stop reason: HOSPADM

## 2024-01-01 RX ORDER — DEXTROSE 50 % IN WATER (D50W) INTRAVENOUS SYRINGE
Status: DISCONTINUED | OUTPATIENT
Start: 2024-01-01 | End: 2024-08-23 | Stop reason: HOSPADM

## 2024-01-01 RX ORDER — FENTANYL CITRATE 50 UG/ML
INJECTION, SOLUTION INTRAMUSCULAR; INTRAVENOUS
Status: DISCONTINUED | OUTPATIENT
Start: 2024-01-01 | End: 2024-08-23 | Stop reason: HOSPADM

## 2024-01-01 RX ORDER — ASPIRIN 325 MG
325 TABLET ORAL
Status: COMPLETED | OUTPATIENT
Start: 2024-01-01 | End: 2024-01-01

## 2024-01-01 RX ORDER — LIDOCAINE HYDROCHLORIDE 10 MG/ML
INJECTION, SOLUTION EPIDURAL; INFILTRATION; INTRACAUDAL; PERINEURAL
Status: DISCONTINUED | OUTPATIENT
Start: 2024-01-01 | End: 2024-08-23 | Stop reason: HOSPADM

## 2024-01-01 RX ORDER — AMIODARONE HYDROCHLORIDE 150 MG/3ML
INJECTION, SOLUTION INTRAVENOUS
Status: DISCONTINUED | OUTPATIENT
Start: 2024-01-01 | End: 2024-08-23 | Stop reason: HOSPADM

## 2024-01-01 RX ADMIN — TICAGRELOR 180 MG: 90 TABLET ORAL at 10:08

## 2024-01-01 RX ADMIN — HEPARIN SODIUM 4000 UNITS: 5000 INJECTION, SOLUTION INTRAVENOUS; SUBCUTANEOUS at 10:08

## 2024-01-01 RX ADMIN — ASPIRIN 325 MG ORAL TABLET 325 MG: 325 PILL ORAL at 10:08

## 2024-03-27 RX ORDER — CEFUROXIME AXETIL 500 MG/1
500 TABLET ORAL 2 TIMES DAILY
COMMUNITY
Start: 2023-10-26 | End: 2024-04-25

## 2024-03-27 NOTE — PROGRESS NOTES
Subjective     Patient ID: Erlinda Cuevas is a 78 y.o. female.    Chief Complaint: No chief complaint on file.    This pleasant 78 year old female presents to the clinic with her neighbor as a new patient self referral for recurrent UTI's. The patient gave permission to discuss her healthcare with the neighbor.  Patient states UTI's started about two years ago and has been treated 8 to 10 times in the last two years with antibiotics. She reports  frequency, urgency, sometimes urge incontinence, incomplete bladder emptying, and nocturia 2 times a night.   Her PVR is 112 mls.  She denies dysuria or hematuria. She denies vaginal discharge. She does report having the occasional itching and has some over the counter cream that she cannot recall the name of that she uses when this happens.  She denies fever, chills, nausea or vomiting.  She denies abdominal, flank, bladder or back pain. She is a previous smoker and denies drinking alcohol.  She states she had a total hysterectomy in 1972. Her PMH is extensive and includes CVA,  Afib, DM, HTN, frequent falls, noncompliance, depression, bilateral carotid stenosis, and chronic heart failure. She states she stopped all her medications and that her diabetes is controlled through her diet. We discussed uncontrolled diabetes can effect the way she voids with such as frequency.  I will culture her urine and treat if indicated.  We will get a BMP and A1c to check her renal function and blood sugar.  I discussed adding Hiprex as outlined in the plan for 6 months and the side effects.  She was encouraged to read up on the side effects. I will see her back in the clinic in 3 months or sooner if needed.  I discussed the plan in detail with the patient and neighbor and they are in agreement with the plan.  All their questions were answered at today's visit.     Urine Culture History:   No Other cultures found in the Epic system  Urine culture grew >100,000 Escherichia coli on  12/27/2022  ----------------------------------------------------------------------------  [March 28, 2024].     Review of Systems   Constitutional:  Negative for activity change and fever.   HENT:  Negative for hearing loss and trouble swallowing.    Eyes:  Negative for visual disturbance.   Respiratory:  Negative for cough, shortness of breath and wheezing.    Cardiovascular:  Negative for chest pain.   Gastrointestinal:  Negative for abdominal pain, diarrhea, nausea and vomiting.   Endocrine: Negative for polyuria.   Genitourinary:  Positive for frequency, nocturia and urgency. Negative for bladder incontinence, decreased urine volume, difficulty urinating, dysuria, enuresis, flank pain and hematuria.        UTI's    Musculoskeletal:  Negative for back pain and gait problem.   Integumentary:  Negative for rash.   Neurological:  Negative for speech difficulty and weakness.   Psychiatric/Behavioral:  Negative for behavioral problems and confusion.           Objective     Physical Exam  Vitals and nursing note reviewed.   Constitutional:       General: She is not in acute distress.     Appearance: Normal appearance. She is not ill-appearing, toxic-appearing or diaphoretic.   HENT:      Head: Normocephalic.   Eyes:      Extraocular Movements: Extraocular movements intact.   Cardiovascular:      Rate and Rhythm: Normal rate and regular rhythm.      Heart sounds: Normal heart sounds.   Pulmonary:      Effort: Pulmonary effort is normal.      Breath sounds: Normal breath sounds. No wheezing, rhonchi or rales.   Abdominal:      General: Bowel sounds are normal.      Palpations: Abdomen is soft.      Tenderness: There is no abdominal tenderness. There is no right CVA tenderness, left CVA tenderness, guarding or rebound.   Musculoskeletal:         General: Normal range of motion.      Cervical back: Normal range of motion. No rigidity.   Skin:     General: Skin is warm and dry.   Neurological:      General: No focal deficit  present.      Mental Status: She is alert and oriented to person, place, and time.      Motor: No weakness.      Coordination: Coordination normal.      Gait: Gait normal.   Psychiatric:         Mood and Affect: Mood normal.         Behavior: Behavior normal.         Thought Content: Thought content normal.          Assessment and Plan     Problem List Items Addressed This Visit          Renal/    Frequent UTI - Primary    Relevant Orders    Urine culture    Nocturia    Relevant Orders    Urine culture    Frequency of urination    Relevant Orders    Urine culture    Urgency of urination    Relevant Orders    Urine culture       Endocrine    Type 2 diabetes mellitus, without long-term current use of insulin    Relevant Orders    Basic Metabolic Panel    Hemoglobin A1C        Urine Culture   Hgb A1c , BMP   Rx Methenamine Hippurate (Hiprex) 1 gm take one tablet twice a day -- read up on side effects (do not start this medication until we call you the culture results)   Follow up with Urology NP AUTUMN Trujillo in 3 months or sooner if needed

## 2024-03-28 ENCOUNTER — TELEPHONE (OUTPATIENT)
Dept: UROLOGY | Facility: CLINIC | Age: 79
End: 2024-03-28
Payer: MEDICARE

## 2024-03-28 ENCOUNTER — OFFICE VISIT (OUTPATIENT)
Dept: UROLOGY | Facility: CLINIC | Age: 79
End: 2024-03-28
Payer: MEDICARE

## 2024-03-28 VITALS
WEIGHT: 113 LBS | OXYGEN SATURATION: 98 % | HEIGHT: 60 IN | HEART RATE: 90 BPM | RESPIRATION RATE: 18 BRPM | TEMPERATURE: 98 F | SYSTOLIC BLOOD PRESSURE: 122 MMHG | BODY MASS INDEX: 22.19 KG/M2 | DIASTOLIC BLOOD PRESSURE: 70 MMHG

## 2024-03-28 DIAGNOSIS — R35.0 FREQUENCY OF URINATION: ICD-10-CM

## 2024-03-28 DIAGNOSIS — R39.15 URGENCY OF URINATION: ICD-10-CM

## 2024-03-28 DIAGNOSIS — E11.9 TYPE 2 DIABETES MELLITUS WITHOUT COMPLICATION, WITHOUT LONG-TERM CURRENT USE OF INSULIN: ICD-10-CM

## 2024-03-28 DIAGNOSIS — R35.1 NOCTURIA: ICD-10-CM

## 2024-03-28 DIAGNOSIS — N39.0 FREQUENT UTI: Primary | ICD-10-CM

## 2024-03-28 PROCEDURE — 99214 OFFICE O/P EST MOD 30 MIN: CPT | Mod: S$PBB,,, | Performed by: NURSE PRACTITIONER

## 2024-03-28 PROCEDURE — 99214 OFFICE O/P EST MOD 30 MIN: CPT | Mod: PBBFAC | Performed by: NURSE PRACTITIONER

## 2024-03-28 PROCEDURE — 87086 URINE CULTURE/COLONY COUNT: CPT | Mod: ,,, | Performed by: CLINICAL MEDICAL LABORATORY

## 2024-03-28 RX ORDER — METHENAMINE HIPPURATE 1000 MG/1
TABLET ORAL
Qty: 60 TABLET | Refills: 6 | Status: SHIPPED | OUTPATIENT
Start: 2024-03-28 | End: 2024-04-25

## 2024-03-28 NOTE — TELEPHONE ENCOUNTER
NP  called and said pt BS is 616mg/dl and her Hgb A1C is 12.7.  He said to call pt and let her know that these values are greatly elevated and she could go into ketoacidosis and that can kill her.  He said she needs to go to ER or immediate care at least for this.  I relayed this to pt and she said she knows it is her lunch and sweets she ate before blood drawn, and she is not going to ER, as she does not drive and her sight is bad.  I told her she could call ambulance with a BS that high.  She said she is not doing that either, because she knows what is going on with sugar.  She asked what else can happen to her besides death?  I told her nothing worse than that can happen.  But s/s she might have is the sweet fruity smell to her breath, fast breathing, headache, passing out, very tired, NV.  She voiced understanding but said she still is not going to the ER or immediate care.  I told her I will NP know.

## 2024-03-28 NOTE — PATIENT INSTRUCTIONS
Urine Culture   Hgb A1c , BMP   Rx Methenamine Hippurate (Hiprex) 1 gm take one tablet twice a day -- read up on side effects (do not start this medication until we call you the culture results)   Follow up with Urology NP AUTUMN Trujillo in 3 months or sooner if needed

## 2024-03-30 LAB — UA COMPLETE W REFLEX CULTURE PNL UR: NO GROWTH

## 2024-04-01 ENCOUNTER — TELEPHONE (OUTPATIENT)
Dept: UROLOGY | Facility: CLINIC | Age: 79
End: 2024-04-01
Payer: MEDICARE

## 2024-04-01 NOTE — TELEPHONE ENCOUNTER
----- Message from David Davis NP sent at 3/31/2024  8:15 PM CDT -----  Please let patient know her urine culture was negative, thanks   I called and spoke with the pt.  Informed her of the above message.  She voiced understanding.  I told  her she can go ahead and start the Hiprex BID now, since she does not have a UTI.  She reports her BS is 475 today, that she is going to try to lay off so many sweets, and drink more water to try to get her BS back down.  I told her that 475 is better than what it was last week, but it is still too high.  She voiced understanding.

## 2024-04-25 ENCOUNTER — OFFICE VISIT (OUTPATIENT)
Dept: NEUROLOGY | Facility: CLINIC | Age: 79
End: 2024-04-25
Payer: MEDICARE

## 2024-04-25 VITALS
DIASTOLIC BLOOD PRESSURE: 92 MMHG | SYSTOLIC BLOOD PRESSURE: 152 MMHG | WEIGHT: 113 LBS | BODY MASS INDEX: 22.19 KG/M2 | RESPIRATION RATE: 18 BRPM | HEART RATE: 90 BPM | HEIGHT: 60 IN | OXYGEN SATURATION: 99 %

## 2024-04-25 DIAGNOSIS — I63.9 CEREBROVASCULAR ACCIDENT (CVA), UNSPECIFIED MECHANISM: Primary | ICD-10-CM

## 2024-04-25 DIAGNOSIS — E78.5 HYPERLIPIDEMIA, UNSPECIFIED HYPERLIPIDEMIA TYPE: ICD-10-CM

## 2024-04-25 DIAGNOSIS — I69.354 HEMIPARESIS AFFECTING LEFT SIDE AS LATE EFFECT OF CEREBROVASCULAR ACCIDENT (CVA): ICD-10-CM

## 2024-04-25 DIAGNOSIS — I10 PRIMARY HYPERTENSION: ICD-10-CM

## 2024-04-25 DIAGNOSIS — I48.91 ATRIAL FIBRILLATION WITH RVR: ICD-10-CM

## 2024-04-25 DIAGNOSIS — I65.23 BILATERAL CAROTID ARTERY STENOSIS: ICD-10-CM

## 2024-04-25 PROCEDURE — 99212 OFFICE O/P EST SF 10 MIN: CPT | Mod: S$PBB,,, | Performed by: NURSE PRACTITIONER

## 2024-04-25 PROCEDURE — 99214 OFFICE O/P EST MOD 30 MIN: CPT | Mod: PBBFAC | Performed by: NURSE PRACTITIONER

## 2024-04-25 NOTE — PROGRESS NOTES
Subjective:       Patient ID: Erlinda Cuevas is a 78 y.o. female     Chief Complaint:    Chief Complaint   Patient presents with    Follow-up        Allergies:  Pravastatin    Current Medications:    Outpatient Encounter Medications as of 4/25/2024   Medication Sig Dispense Refill    aspirin (ECOTRIN) 81 MG EC tablet Take 81 mg by mouth every other day.      [DISCONTINUED] cefUROXime (CEFTIN) 500 MG tablet Take 500 mg by mouth 2 (two) times daily. (Patient not taking: Reported on 4/25/2024)      [DISCONTINUED] methenamine (HIPREX) 1 gram Tab Take one tablet twice a day (Patient not taking: Reported on 4/25/2024) 60 tablet 6    [DISCONTINUED] pravastatin (PRAVACHOL) 20 MG tablet Take 1 tablet (20 mg total) by mouth once daily. 90 tablet 3     No facility-administered encounter medications on file as of 4/25/2024.       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  It has been 16 months since her last clinic visit with us.    History of CAD (5 stents), A-fib, HTN, and the above mentioned strokes x2.  At her last visit 16 months ago she was on plavix and asa for CVA prevention as well as CAD.  She is followed in cardiology by Dr. Nuñez and Nicole Stewart, NP.  Last visit in their clinic November of 2023.  At that note she apparenlty had stopped all her medications, but nicole was able to get her talked into taking an ASA every other day but really she reports not doing this, though I do ask her nicely today to please consider this.  Interesting given her extensive history of CAD and multiple strokes but she supposedly was willing to accept the risk.      She put herself on schedule today for reported head pain.  Her description is left upper neck and occipital region.  Ongoing about 3 days, intermittent.  Seems to be in a focal area by her report, no sharp shooting electrical pain by  her description.  It sound more musculoskeletal by description.  She declines medications which we already knew.  I suggest cool or heat compresses.    Prior evaluated by Dr. Noel for noted bilateral 60% stenosis on CTA neck, but she had not followed with him at her last visit.    Noted also primary care reported in past her diabetes was not well maintained.      She had several stents placed in November 2022 when she presented with chest pain, her EF was noted 35%, followed by Nicole Stewart, GUILHERME.           Review of Systems  Review of Systems   Constitutional:  Negative for diaphoresis and fever.   HENT:  Negative for congestion, hearing loss and tinnitus.    Eyes:  Negative for blurred vision, double vision, photophobia, discharge and redness.   Respiratory:  Negative for cough and shortness of breath.    Cardiovascular:  Negative for chest pain.   Gastrointestinal:  Negative for abdominal pain, nausea and vomiting.   Musculoskeletal:  Negative for back pain, joint pain, myalgias and neck pain.   Skin:  Negative for itching and rash.   Neurological:  Negative for dizziness, tremors, sensory change, speech change, focal weakness, seizures, loss of consciousness, weakness and headaches.   Psychiatric/Behavioral:  Negative for depression, hallucinations and memory loss. The patient does not have insomnia.    All other systems reviewed and are negative.     Objective:     NEUROLOGICAL EXAMINATION:     MENTAL STATUS   Oriented to person, place, and time.   Registration: recalls 3 of 3 objects. Recall at 5 minutes: recalls 3 of 3 objects.   Attention: normal. Concentration: normal.   Speech: speech is normal   Level of consciousness: alert  Knowledge: good and consistent with education.   Normal comprehension.     CRANIAL NERVES     CN II   Visual fields full to confrontation.   Visual acuity: normal  Right visual field deficit: none  Left visual field deficit: none     CN III, IV, VI   Pupils are equal, round, and  reactive to light.  Extraocular motions are normal.   Right pupil: Size: 3 mm. Shape: regular. Reactivity: brisk. Consensual response: intact. Accommodation: intact.   Left pupil: Size: 3 mm. Shape: regular. Reactivity: brisk. Consensual response: intact. Accommodation: intact.   CN III: no CN III palsy  CN VI: no CN VI palsy  Nystagmus: none   Diplopia: none  Upgaze: normal  Downgaze: normal  Conjugate gaze: present  Vestibulo-ocular reflex: present    CN V   Facial sensation intact.   Right facial sensation deficit: none  Left facial sensation deficit: none  Right corneal reflex: normal  Left corneal reflex: normal    CN VII   Facial expression full, symmetric.   Right facial weakness: none  Left facial weakness: none  Right taste: normal  Left taste: normal    CN VIII   CN VIII normal.   Hearing: intact    CN IX, X   CN IX normal.   CN X normal.   Palate: symmetric    CN XI   CN XI normal.   Right sternocleidomastoid strength: normal  Left sternocleidomastoid strength: normal  Right trapezius strength: normal  Left trapezius strength: normal    CN XII   CN XII normal.   Tongue: not atrophic  Fasciculations: absent  Tongue deviation: none    MOTOR EXAM   Muscle bulk: normal  Overall muscle tone: normal  Right arm tone: normal  Left arm tone: normal  Right arm pronator drift: absent  Left arm pronator drift: absent  Right leg tone: normal  Left leg tone: normal    Strength   Right neck flexion: 5/5  Left neck flexion: 5/5  Right neck extension: 5/5  Left neck extension: 5/5  Right deltoid: 5/5  Left deltoid: 5/5  Right biceps: 5/5  Left biceps: 5/5  Right triceps: 5/5  Left triceps: 5/5  Right wrist flexion: 5/5  Left wrist flexion: 5/5  Right wrist extension: 5/5  Left wrist extension: 5/5  Right interossei: 5/5  Left interossei: 5/5  Right iliopsoas: 5/5  Left iliopsoas: 5/5  Right quadriceps: 5/5  Left quadriceps: 5/5  Right hamstrin/5  Left hamstrin/5  Right anterior tibial: 5/5  Left anterior tibial:  5/5  Right posterior tibial: 5/5  Left posterior tibial: 5/5  Right gastroc: 5/5  Left gastroc: 5/5    REFLEXES     Reflexes   Right brachioradialis: 2+  Left brachioradialis: 2+  Right biceps: 2+  Left biceps: 2+  Right triceps: 2+  Left triceps: 2+  Right patellar: 2+  Left patellar: 2+  Right achilles: 2+  Left achilles: 2+  Right plantar: normal  Left plantar: normal  Right Correa: absent  Left Correa: absent  Right ankle clonus: absent  Left ankle clonus: absent  Right pendular knee jerk: absent  Left pendular knee jerk: absent    SENSORY EXAM   Light touch normal.   Right arm light touch: normal  Left arm light touch: normal  Right leg light touch: normal  Left leg light touch: normal  Vibration normal.   Right arm vibration: normal  Left arm vibration: normal  Right leg vibration: normal  Left leg vibration: normal  Proprioception normal.   Right arm proprioception: normal  Left arm proprioception: normal  Right leg proprioception: normal  Left leg proprioception: normal  Pinprick normal.   Right arm pinprick: normal  Left arm pinprick: normal  Right leg pinprick: normal  Left leg pinprick: normal  Graphesthesia: normal  Romberg: negative  Stereognosis: normal    GAIT AND COORDINATION     Gait  Gait: normal     Coordination   Finger to nose coordination: normal  Heel to shin coordination: normal  Tandem walking coordination: normal    Tremor   Resting tremor: absent  Intention tremor: absent  Action tremor: absent       Physical Exam  Vitals and nursing note reviewed.   Constitutional:       Appearance: Normal appearance.   HENT:      Head: Normocephalic.   Eyes:      Extraocular Movements: Extraocular movements intact and EOM normal.      Pupils: Pupils are equal, round, and reactive to light.   Cardiovascular:      Rate and Rhythm: Normal rate and regular rhythm.   Pulmonary:      Effort: Pulmonary effort is normal.      Breath sounds: Normal breath sounds.   Musculoskeletal:         General: No swelling  or tenderness. Normal range of motion.      Cervical back: Normal range of motion and neck supple.      Right lower leg: No edema.      Left lower leg: No edema.   Skin:     General: Skin is warm and dry.      Coloration: Skin is not jaundiced.      Findings: No rash.   Neurological:      General: No focal deficit present.      Mental Status: She is alert and oriented to person, place, and time. Mental status is at baseline.      GCS: GCS eye subscore is 4. GCS verbal subscore is 5. GCS motor subscore is 6.      Cranial Nerves: No cranial nerve deficit.      Sensory: No sensory deficit.      Motor: Motor function is intact. No weakness.      Coordination: Coordination is intact. Coordination normal. Finger-Nose-Finger Test, Heel to Shin Test and Romberg Test normal.      Gait: Gait is intact. Gait and tandem walk normal.      Deep Tendon Reflexes: Reflexes normal.      Reflex Scores:       Tricep reflexes are 2+ on the right side and 2+ on the left side.       Bicep reflexes are 2+ on the right side and 2+ on the left side.       Brachioradialis reflexes are 2+ on the right side and 2+ on the left side.       Patellar reflexes are 2+ on the right side and 2+ on the left side.       Achilles reflexes are 2+ on the right side and 2+ on the left side.  Psychiatric:         Mood and Affect: Mood normal.         Speech: Speech normal.         Behavior: Behavior normal.          Assessment:     Problem List Items Addressed This Visit          Neuro    Cerebrovascular accident (CVA) - Primary (Chronic)    Hemiparesis affecting left side as late effect of cerebrovascular accident (CVA)       Cardiac/Vascular    Hypertension    Hyperlipidemia    Bilateral carotid artery stenosis    Atrial fibrillation with RVR            Primary Diagnosis and ICD10  Cerebrovascular accident (CVA), unspecified mechanism [I63.9]    Plan:     Patient Instructions   Encouraged to take aspirin 81mg daily  Warm or moist compresses as needed  May  use motrin or aleve as directed    Medications Discontinued During This Encounter   Medication Reason    cefUROXime (CEFTIN) 500 MG tablet     methenamine (HIPREX) 1 gram Tab     pravastatin (PRAVACHOL) 20 MG tablet          Requested Prescriptions      No prescriptions requested or ordered in this encounter       No orders of the defined types were placed in this encounter.

## 2024-04-25 NOTE — PATIENT INSTRUCTIONS
Encouraged to take aspirin 81mg daily  Warm or moist compresses as needed  May use motrin or aleve as directed

## 2024-07-01 PROBLEM — N39.0 FREQUENT UTI: Status: RESOLVED | Noted: 2024-01-01 | Resolved: 2024-01-01

## 2024-08-22 PROBLEM — I25.119 CORONARY ARTERY DISEASE INVOLVING NATIVE CORONARY ARTERY OF NATIVE HEART WITH ANGINA PECTORIS: Status: ACTIVE | Noted: 2024-01-01

## 2024-08-22 PROBLEM — I21.3 ST ELEVATION MYOCARDIAL INFARCTION (STEMI): Status: ACTIVE | Noted: 2022-11-19

## 2024-08-22 PROBLEM — R57.0 CARDIOGENIC SHOCK: Status: ACTIVE | Noted: 2024-01-01

## 2024-08-22 PROBLEM — I46.9 CARDIAC ARREST: Status: ACTIVE | Noted: 2024-01-01

## 2024-08-22 PROBLEM — R06.00 DYSPNEA: Status: ACTIVE | Noted: 2024-01-01

## 2024-08-22 PROBLEM — I21.02 ACUTE ST ELEVATION MYOCARDIAL INFARCTION (STEMI) DUE TO OCCLUSION OF LEFT ANTERIOR DESCENDING (LAD) CORONARY ARTERY: Status: ACTIVE | Noted: 2024-01-01

## 2024-08-22 PROBLEM — T68.XXXA HYPOTHERMIA: Status: ACTIVE | Noted: 2024-01-01

## 2024-08-22 PROBLEM — E87.20 HYPERLACTATEMIA: Status: ACTIVE | Noted: 2024-01-01

## 2024-08-22 NOTE — ED NOTES
GUILHERME Nolan states that cath lab is ready for patient. Notified GUILHERME Nolan and Dr. Sanders that patient's grandson is patient's point of contact.

## 2024-08-22 NOTE — DISCHARGE SUMMARY
[facility] - Emergency Department  Discharge Note  Short Stay    Procedure(s) (LRB):  Angiogram, Coronary, with Left Heart Cath (N/A)  Percutaneous coronary intervention (N/A)      OUTCOME: Death     DISPOSITION:     FINAL DIAGNOSIS:  ST elevation myocardial infarction (STEMI)    FOLLOWUP: None (patient )    DISCHARGE INSTRUCTIONS:  To  home     TIME SPENT ON DISCHARGE: 30 minutes

## 2024-08-22 NOTE — H&P
[facility] Cardiology Consult      Consultant Attending:Alvaro Sanders    Reason for Consult:     STEMI    Subjective:      History of Present Illness:  Erlinda Cuevas is a 78 y.o.  female who  has a past medical history of Benign paroxysmal vertigo, Cardiomyopathy, Hypertension, Pulmonary HTN, Stroke, Takotsubo cardiomyopathy, and Valvular regurgitation.. The patient presented to [facility] on 8/22/2024 with a primary complaint of Shortness of Breath      Called about the patient by ER attending, Dr. Flores  who stated patient BIBEMS, little history provided other than inferior ROBI (more concerning anterior changes on my review), patient in extremis, vitals borderline, but patient awake/alert following commands despite lactate on abg >15 and rectal temp 91 degrees. Confirmed w/ cath lab that team//lab available - affirmative. Asked ER to send patient asap. Patient subsequently transferred to cath lab for emergent coronary angiography in context of (presume) late-presentation STEMI as the only history provided was that she has been having chest pain for 3-4 days. No family present at bedside - asked ER attending if they had contacted, was told they had tried, left voicemail. Discussed with Dr. Nuñez, interventional cardiologist on call in cath lab - conveyed all above information and briefly discussed while patient being prepped/draped. See assessment/plan.      Past Medical History:  Past Medical History:   Diagnosis Date    Benign paroxysmal vertigo     Cardiomyopathy     Hypertension     Pulmonary HTN     Stroke     affected balance     Takotsubo cardiomyopathy     Valvular regurgitation        Past Surgical History:  Past Surgical History:   Procedure Laterality Date    ANGIOGRAM, CORONARY, WITH LEFT HEART CATHETERIZATION N/A 11/18/2022    Procedure: Angiogram, Coronary, with Left Heart Cath;  Surgeon: Mingo Denson DO;  Location: [facility] CATH LAB;  Service: Cardiology;   Laterality: N/A;    CATARACT EXTRACTION W/ INTRAOCULAR LENS  IMPLANT, BILATERAL      CORONARY ANGIOGRAPHY INCLUDING BYPASS GRAFTS WITH CATHETERIZATION OF LEFT HEART N/A 2022    Procedure: ANGIOGRAM, CORONARY, INCLUDING BYPASS GRAFT, WITH LEFT HEART CATHETERIZATION;  Surgeon: Mingo Denson DO;  Location: [facility] CATH LAB;  Service: Cardiology;  Laterality: N/A;    EYE SURGERY      HYSTERECTOMY         Allergies:  Review of patient's allergies indicates:   Allergen Reactions    Pravastatin Rash       Medications:   In-Hospital Scheduled Medications:   bivalirudin  0.75 mg/kg Intravenous Once      In-Hospital PRN Medications:    Current Facility-Administered Medications:     0.9% NaCl, , , Continuous PRN    amiodarone, , , PRN    dextrose 50 % in water (D50W), , , PRN    EPINEPHrine, , , PRN    fentaNYL, , , PRN    LIDOcaine (PF) 10 mg/ml (1%), , , PRN    LIDOcaine HCL 20 mg/ml (2%), , , PRN    magnesium sulfate IVPB, , , Continuous PRN    midazolam, , , PRN    sodium bicarbonate, , , PRN   In-Hospital IV Infusion Medications:   0.9% NaCl    Continuous PRN   Stopped at 24 1144    bivalirudin (ANGIOMAX) 250 mg in D5W 50 mL infusion  1.75 mg/kg/hr Intravenous Continuous   Stopped at 24 1144    magnesium sulfate IVPB    Continuous PRN   4 g at 24 1116      Home Medications:  Prior to Admission medications    Medication Sig Start Date End Date Taking? Authorizing Provider   aspirin (ECOTRIN) 81 MG EC tablet Take 81 mg by mouth every other day.  24  Provider, Historical       Family History:  Family History   Problem Relation Name Age of Onset    Cancer Mother      Heart disease Father      Heart disease Sister      Diabetes Sister      Cancer Brother         Social History:  Social History     Tobacco Use    Smoking status: Former     Current packs/day: 0.00     Types: Cigarettes     Quit date:      Years since quittin.6    Smokeless tobacco: Never   Substance Use Topics     Alcohol use: Never    Drug use: Never       Review of Systems:  All other systems are reviewed and are negative except for those mentioned in HPI and A/P.    Objective:   Last 24 Hour Vital Signs:  BP  Min: 88/42  Max: 88/42  Temp  Av.3 °F (32.9 °C)  Min: 91.3 °F (32.9 °C)  Max: 91.3 °F (32.9 °C)  Pulse  Av  Min: 55  Max: 55  Resp  Av  Min: 35  Max: 35  SpO2  Av %  Min: 100 %  Max: 100 %  Weight  Av.9 kg (110 lb)  Min: 49.9 kg (110 lb)  Max: 49.9 kg (110 lb)  No intake/output data recorded.  Body mass index is 21.48 kg/m².    Physical Examination:  Gen: NAD  HEENT: NC/AT  CV: RRR  Lungs: coarse BS  Abd: NTTP  Ext: AT  Neuro: CNGI  Psych: AMAA  Skin: no rash    Laboratory Results:  Most Recent Data:  CBC:   Lab Results   Component Value Date    WBC 26.06 (HH) 2024    HGB 14.2 2024    HCT 45.2 2024     2024    MCV 99.8 (H) 2024    RDW 12.3 2024    DIFFTYPE Manual 2024     BMP:   Lab Results   Component Value Date     (L) 2024    K 5.2 (H) 2024    CL 89 (L) 2024    CO2 7 (L) 2024    BUN 46 (H) 2024     (H) 2024    CALCIUM 10.0 2024    MG 3.4 (H) 2024     LFTs:   Lab Results   Component Value Date    PROT 7.9 2024    ALBUMIN 4.0 2024    BILITOT 1.7 (H) 2024    AST 3,576 (H) 2024    ALKPHOS 186 (H) 2024    ALT 2,045 (H) 2024     Coags:   Lab Results   Component Value Date    INR 2.37 2024     FLP:   Lab Results   Component Value Date    CHOL 201 (H) 2022    HDL 31 (L) 2022    LDLCALC  2022      Comment:      Unable to calculate due to one of the following values:  Cholesterol <5  HDL Cholesterol <5  Triglycerides <10 or >400    TRIG 428 (H) 2022    CHOLHDL 6.5 2022     DM:   Lab Results   Component Value Date    HGBA1C 12.7 (H) 2024    HGBA1C 7.7 (H) 2022    LDLCALC  2022      Comment:      Unable  "to calculate due to one of the following values:  Cholesterol <5  HDL Cholesterol <5  Triglycerides <10 or >400    CREATININE 2.69 (H) 08/22/2024     Thyroid:   Lab Results   Component Value Date    TSH 1.710 06/22/2022     Anemia:   Lab Results   Component Value Date    EYQVRXGC50 269 06/22/2022    FOLATE 19.8 (H) 06/22/2022     Cardiac:   Lab Results   Component Value Date    TROPONINI <0.017 09/01/2021     Urinalysis:   Lab Results   Component Value Date    LABURIN No Growth 03/28/2024    COLORU Yellow 10/16/2023    SPECGRAV 1.015 10/16/2023    NITRITE positive 10/16/2023    KETONESU negative 10/16/2023    UROBILINOGEN 0.2 10/16/2023    WBCUA >182 (H) 12/27/2022       Trended Lab Data:  Recent Labs   Lab 08/22/24  1011 08/22/24  1014   WBC  --  26.06*   HGB  --  14.2   HCT 44 45.2   PLT  --  286   MCV  --  99.8*   RDW  --  12.3   NA  --  129*   K  --  5.2*   CL  --  89*   CO2  --  7*   BUN  --  46*   GLU  --  397*   CALCIUM  --  10.0   PROT  --  7.9   ALBUMIN  --  4.0   BILITOT  --  1.7*   AST  --  3,576*   ALKPHOS  --  186*   ALT  --  2,045*         Trended Cardiac Data:  No results for input(s): "TROPONINI", "CKTOTAL", "CKMB", "BNP" in the last 168 hours.    Radiology:  X-Ray Chest AP Portable    Result Date: 8/22/2024  EXAMINATION: XR CHEST AP PORTABLE CLINICAL HISTORY: Dyspnea, unspecified COMPARISON: 17 November 2022 TECHNIQUE: XR CHEST AP PORTABLE FINDINGS: The heart and mediastinum are normal in size and configuration.  The pulmonary vascularity is normal in caliber.  No lung infiltrates, effusions, pneumothorax or other abnormality is demonstrated.     No evidence of cardiopulmonary disease. Electronically signed by: Hira Degroot Date:    08/22/2024 Time:    10:27      Reviewed all available cardiac studies pertinent to this case personally.      Assessment:     Erlinda Cuevas is a 78 y.o. female with:  Present on Admission:   ST elevation myocardial infarction (STEMI)       Recommendations: "     #STEMI - suspect anterior   -emergent LHC and coronary angiography  -heparin gtt, DAPT load  -discussed up front MCS given hypothermia, lactate 15;   -also discussed RV temp wire   -ultimately defer to  choice  -discussed in detail the last PCI, as well as all prior stents, stent-sizes, and position as well as guide catheter size/shape used (last cath report summary below)        Results for orders placed during the hospital encounter of 11/17/22    Cardiac catheterization    Conclusion    The Ost Cx to Prox Cx lesion was 90% stenosed with 0% stenosis post-intervention.    The Prox LAD to Mid LAD lesion was 95% stenosed with 0% stenosis post-intervention.    The 1st Mrg lesion was 80% stenosed with 0% stenosis post-intervention.    A stent was successfully placed.    A stent was successfully placed.    A stent was successfully placed.    The estimated blood loss was none.    There was three vessel coronary artery disease.    The procedure log was documented by Documenter: RT Rola and verified by Mingo Denson DO.    Date: 11/21/2022  Time: 1:42 PM  Severe stenosis proximal Cx, OM1 undergoing successful PCI with DILMA x 4  Severe stenosis mid LAD undergoing successful PCI with DILMA x 1  Residual severe stenosis in Mid RCA, too small for intervention        Thank you for allowing us to participate in the care of this patient. We intend to continue to follow with patient in post-MI care period in the ICU. Please contact me via secure chat if you have any questions regarding this consult.    Alvaro Antoinesshelby [facility]  Interventional Cardiology

## 2024-08-22 NOTE — PROCEDURES
Erlinda Cuevas is a 78 y.o. female patient.    Temp: (!) 91.3 °F (32.9 °C) (08/22/24 1021)  Pulse: (!) 55 (08/22/24 0958)  Resp: (!) 35 (08/22/24 0958)  BP: (!) 88/42 (08/22/24 0958)  SpO2: 100 % (08/22/24 0958)  Weight: 49.9 kg (110 lb) (08/22/24 1030)       Intubation    Date/Time: 8/22/2024 11:26 AM  Location procedure was performed: [facility]    Performed by: Ericka Salvador AG-ACNP  Authorized by: Ericka Salvador AG-ACNP  Pre-operative diagnosis: cardiac arrest  Consent Done: Emergent Situation  Indications: respiratory failure and hypoxemia  Intubation method: fiberoptic oral  Patient status: unconscious  Preoxygenation: BVM  Laryngoscope size: Glide 3  Tube size: 8.0 mm  Tube type: cuffed  Number of attempts: 1  Cricoid pressure: yes  Cords visualized: yes  Post-procedure assessment: CO2 detector and ETCO2 monitor  Breath sounds: clear  Cuff inflated: yes  ETT to lip: 25 cm          8/22/2024

## 2024-08-22 NOTE — BRIEF OP NOTE
Patient admitted to cath lab hypotensive and poorly responsive. Moved to cath table. Underwent emergent LHC via right femoral artery. Also, gain access to right femoral vein. Diagnostic LHC revealed totally occluded LAD. Diagnostic exchange to guide catheter. Angiomax given. Access to LAD with Choice Floppy Wire. 3.0 angioplasty balloon crossed across lesion with one balloon inflation. Balloon removed. Patient became severely hypotensive with PEA. Code Blue called to Cath Lab. ACLS initiated. Patient  at 1122am.

## 2024-08-22 NOTE — ED PROVIDER NOTES
Encounter Date: 2024       History   No chief complaint on file.    79 Y/O FEMALE WITH CHEST PAIN X 3-4 DAYS AND WORSENING SHORTNESS OF BREATH.  SHE HAS A HISTORY OF PULMONARY HTN IN ADDITION TO CARDIOMYOPATHY AND OTHER ISSUES.  SHE HAS LOW OXYGEN SAYS ON ARRIVAL HERE.        Review of patient's allergies indicates:   Allergen Reactions    Pravastatin Rash     Past Medical History:   Diagnosis Date    Benign paroxysmal vertigo     Cardiomyopathy     Hypertension     Pulmonary HTN     Stroke     affected balance     Takotsubo cardiomyopathy     Valvular regurgitation      Past Surgical History:   Procedure Laterality Date    ANGIOGRAM, CORONARY, WITH LEFT HEART CATHETERIZATION N/A 2022    Procedure: Angiogram, Coronary, with Left Heart Cath;  Surgeon: Mingo Denson DO;  Location: [facility] CATH LAB;  Service: Cardiology;  Laterality: N/A;    CATARACT EXTRACTION W/ INTRAOCULAR LENS  IMPLANT, BILATERAL      CORONARY ANGIOGRAPHY INCLUDING BYPASS GRAFTS WITH CATHETERIZATION OF LEFT HEART N/A 2022    Procedure: ANGIOGRAM, CORONARY, INCLUDING BYPASS GRAFT, WITH LEFT HEART CATHETERIZATION;  Surgeon: Mingo Denson DO;  Location: [facility] CATH LAB;  Service: Cardiology;  Laterality: N/A;    EYE SURGERY      HYSTERECTOMY       Family History   Problem Relation Name Age of Onset    Cancer Mother      Heart disease Father      Heart disease Sister      Diabetes Sister      Cancer Brother       Social History     Tobacco Use    Smoking status: Former     Current packs/day: 0.00     Types: Cigarettes     Quit date:      Years since quittin.6    Smokeless tobacco: Never   Substance Use Topics    Alcohol use: Never    Drug use: Never     Review of Systems   Unable to perform ROS: Acuity of condition       Physical Exam     Initial Vitals   BP Pulse Resp Temp SpO2   -- -- -- -- --      MAP       --         Physical Exam    Nursing note and vitals reviewed.  Constitutional: She appears well-developed and  "well-nourished.   HENT:   Head: Normocephalic and atraumatic.   Nose: Nose normal.   Mouth/Throat: Oropharynx is clear and moist.   Eyes: Conjunctivae and EOM are normal. Pupils are equal, round, and reactive to light.   Neck: Neck supple.   Normal range of motion.  Cardiovascular:  Normal rate and regular rhythm.           Pulmonary/Chest: Breath sounds normal.   Abdominal: Abdomen is soft. Bowel sounds are normal.   Musculoskeletal:         General: Normal range of motion.      Cervical back: Normal range of motion and neck supple.     Neurological: She is alert. GCS score is 15. GCS eye subscore is 4. GCS verbal subscore is 5. GCS motor subscore is 6.   GENERALLY WEAK.     Skin: Capillary refill takes less than 2 seconds.   CYANOTIC NAIL BEDS.          Medical Screening Exam   See Full Note    ED Course   Procedures  Labs Reviewed - No data to display       Imaging Results    None          Medications - No data to display  Medical Decision Making                                    Clinical Impression:    ***Please document a Clinical Impression and click the "Refresh" button to refresh your note and automatically pull in before signing.***         "

## 2024-08-22 NOTE — DISCHARGE SUMMARY
Erlinda Canela was admitted from the ER with STEMI with a lactate of 15, very hypotensive and poorly responsive.  She was brought emergently to the heart catheterization lab for emergent LHC.  Diagnostic LHC demonstrated severe stenosis of the non dominant RCA and totally occluded LAD.  A guide catheter was advanced to engage the left main coronary artery.  Balloon angioplasty was performed but immediately thereafter patient became severely hypotensive, declining into PA.  Despite ACLS the patient .  The family was notified and had been kept apprised of the patient's condition throughout.  She is discharged now to the [facility] [place].

## 2024-08-26 LAB
OHS QRS DURATION: 154 MS
OHS QTC CALCULATION: 508 MS

## (undated) DEVICE — ETCO2 NC MICROSTR FEM ST ADLT

## (undated) DEVICE — Device

## (undated) DEVICE — PROTECTOR ULNAR NERVE FOAM

## (undated) DEVICE — SET IV PRIMARY

## (undated) DEVICE — INTRODUCER KIT MICRO 4FR

## (undated) DEVICE — LEADWIRE DL DC EKG 10 PIN

## (undated) DEVICE — DEVICE BLUE DIAMOND INFL 20ML

## (undated) DEVICE — EVEROLIMUS-ELUTING PLATINUM CHROMIUM CORONARY STENT SYSTEM
Type: IMPLANTABLE DEVICE | Site: CORONARY | Status: NON-FUNCTIONAL
Brand: SYNERGY™ XD

## (undated) DEVICE — OXISENSOR ADULT DIGIT N/S

## (undated) DEVICE — CATH DIAG IMPULSE 6FR FR4

## (undated) DEVICE — COVER SURG LIGHT HANDLE

## (undated) DEVICE — SOL IRR SOD CHL .9% POUR

## (undated) DEVICE — SET FLUID ADMIN 72 L SPK LG

## (undated) DEVICE — CHLORAPREP 10.5 ML APPLICATOR

## (undated) DEVICE — TOWEL OR DISP STRL BLUE 4/PK

## (undated) DEVICE — TUBING HPCIL ROT M/F ADPT 48IN

## (undated) DEVICE — DECANTER FLUID TRNSF WHITE 9IN

## (undated) DEVICE — KIT IV START OCHSNER CUSTOM

## (undated) DEVICE — SHEATH INTRODUCER 6FR 11CM

## (undated) DEVICE — CATH DIAG IMPULSE 6FR FL4

## (undated) DEVICE — GLOVE PROTEXIS PI CRM 8

## (undated) DEVICE — GLOVE PROTEXIS PI CRM 7

## (undated) DEVICE — CATH IV 22G X 1 AUTOGUARD

## (undated) DEVICE — CLIPPER BLADE MOD 4406 (CAREF)

## (undated) DEVICE — GLOVE SENSICARE PI SURG 7

## (undated) DEVICE — KIT Y-ADAPTER W/ INSERT TORQUE

## (undated) DEVICE — GLOVE BIOGEL SKINSENSE PI 7.5

## (undated) DEVICE — SYR MED RAD 150ML

## (undated) DEVICE — DRESSING TRANS 4X4 TEGADERM

## (undated) DEVICE — SHEATH INTRODUCER 7FR 11CM

## (undated) DEVICE — CONTRAST ISOVUE 370 100ML

## (undated) DEVICE — GUIDE VISTA 6FR XB 3

## (undated) DEVICE — CATH IMPULSE D6F PIG 5PK

## (undated) DEVICE — GLOVE SURG ULTRA TOUCH 6

## (undated) DEVICE — SET EXTENSION CLEARLINK 2INJ

## (undated) DEVICE — CATH MINI TREK RX 2.0MM X 12MM

## (undated) DEVICE — GUIDE VISTA XB 3.5

## (undated) DEVICE — NDL PERCUTANEOUS ENTRYBSDN 18

## (undated) DEVICE — GUIDEWIRE CHOICE FLOP J

## (undated) DEVICE — ELECTRODE EKG QUICK COMBO

## (undated) DEVICE — SET EXT IV CATH ONE LINK 8.5IN

## (undated) DEVICE — GUIDE VISTA 6FR JL 4.0

## (undated) DEVICE — WIRE CHOICE PT X SUPP 014X300

## (undated) DEVICE — LINE MCRSTRM NONINTUB ST ETC02

## (undated) DEVICE — CATH EMERGE MR 15 X 3.00